# Patient Record
Sex: FEMALE | Race: WHITE | Employment: OTHER | URBAN - METROPOLITAN AREA
[De-identification: names, ages, dates, MRNs, and addresses within clinical notes are randomized per-mention and may not be internally consistent; named-entity substitution may affect disease eponyms.]

---

## 2017-03-14 ENCOUNTER — APPOINTMENT (OUTPATIENT)
Dept: GENERAL RADIOLOGY | Facility: HOSPITAL | Age: 77
End: 2017-03-14
Attending: EMERGENCY MEDICINE
Payer: MEDICARE

## 2017-03-14 ENCOUNTER — HOSPITAL ENCOUNTER (EMERGENCY)
Facility: HOSPITAL | Age: 77
Discharge: HOME | End: 2017-03-14
Attending: EMERGENCY MEDICINE
Payer: MEDICARE

## 2017-03-14 VITALS
DIASTOLIC BLOOD PRESSURE: 81 MMHG | SYSTOLIC BLOOD PRESSURE: 171 MMHG | HEIGHT: 63 IN | WEIGHT: 200 LBS | OXYGEN SATURATION: 95 % | BODY MASS INDEX: 35.44 KG/M2 | HEART RATE: 73 BPM | TEMPERATURE: 99 F | RESPIRATION RATE: 16 BRPM

## 2017-03-14 DIAGNOSIS — R05.9 COUGH: Primary | ICD-10-CM

## 2017-03-14 PROCEDURE — 99283 EMERGENCY DEPT VISIT LOW MDM: CPT

## 2017-03-14 PROCEDURE — 36415 COLL VENOUS BLD VENIPUNCTURE: CPT

## 2017-03-14 PROCEDURE — 71010 XR CHEST AP PORTABLE  (CPT=71010): CPT

## 2017-03-14 PROCEDURE — 86480 TB TEST CELL IMMUN MEASURE: CPT | Performed by: EMERGENCY MEDICINE

## 2017-03-14 NOTE — ED PROVIDER NOTES
Patient Seen in: [facility] Emergency Department    History   Patient presents with:  Tuberculosis    Stated Complaint: induration at TB test site    HPI    73-year-old female with a history of Parkinson's, hypertension, type 2 diabetes, rheumatoid OF HEEL SPUR Right     CHOLECYSTECTOMY      HERNIA SURGERY         Medications :   methotrexate 2.5 MG Oral Tab,  TAKE 8 TABLETS(20 MG) BY MOUTH EVERY 7 DAYS   guaiFENesin-codeine (CHERATUSSIN AC) 100-10 MG/5ML Oral Solution,  Take 5 mL by mouth every 6 (s SPRAY IN EACH NOSTRIL AT BEDTIME   SIMVASTATIN 20 MG Oral Tab,  TAKE 1 TABLET BY MOUTH EVERY DAY   guaiFENesin-codeine (CHERATUSSIN AC) 100-10 MG/5ML Oral Solution,  Take 5 mL by mouth every 6 (six) hours as needed for cough.    Cefuroxime Axetil 250 MG Ora 96%        Physical Exam    General:  Patient is alert and oriented x3. No acute distress. Well-developed and well-nourished. HEENT: Normocephalic, atraumatic. Pupils are equally round and reactive to light. Extraocular movements are intact.   Orophary Coretha Red blood test will be ordered. She will be discharged back to [facility]. This blood test will require follow-up. If it is negative no further follow-up is needed.   If positive, patient will then require follow-up with infectious diseases to a

## 2017-03-18 LAB
M TB TUBERC IFN-G BLD QL: NEGATIVE
M TB TUBERC IFN-G/MITOGEN IGNF BLD: 0.26 IU/ML
M TB TUBERC IGNF/MITOGEN IGNF CONTROL: >10 IU/ML
MITOGEN IGNF BCKGRD COR BLD-ACNC: 0.39 IU/ML

## 2017-08-09 PROCEDURE — 82043 UR ALBUMIN QUANTITATIVE: CPT | Performed by: INTERNAL MEDICINE

## 2017-08-09 PROCEDURE — 36415 COLL VENOUS BLD VENIPUNCTURE: CPT | Performed by: INTERNAL MEDICINE

## 2017-08-09 PROCEDURE — 82570 ASSAY OF URINE CREATININE: CPT | Performed by: INTERNAL MEDICINE

## 2017-09-20 PROCEDURE — 82043 UR ALBUMIN QUANTITATIVE: CPT | Performed by: INTERNAL MEDICINE

## 2017-09-20 PROCEDURE — 82570 ASSAY OF URINE CREATININE: CPT | Performed by: INTERNAL MEDICINE

## 2017-11-17 PROBLEM — M85.80 OSTEOPENIA WITH HIGH RISK OF FRACTURE: Status: ACTIVE | Noted: 2017-11-17

## 2017-11-17 PROBLEM — Z78.0 ASYMPTOMATIC POSTMENOPAUSAL STATE: Status: ACTIVE | Noted: 2017-11-17

## 2018-03-11 PROBLEM — M81.0 AGE-RELATED OSTEOPOROSIS WITHOUT CURRENT PATHOLOGICAL FRACTURE: Status: ACTIVE | Noted: 2018-03-11

## 2018-06-12 PROCEDURE — 86480 TB TEST CELL IMMUN MEASURE: CPT | Performed by: INTERNAL MEDICINE

## 2018-07-09 PROBLEM — R76.12 POSITIVE QUANTIFERON-TB GOLD TEST: Status: ACTIVE | Noted: 2018-07-09

## 2018-08-17 PROCEDURE — 87086 URINE CULTURE/COLONY COUNT: CPT | Performed by: EMERGENCY MEDICINE

## 2018-08-17 PROCEDURE — 87186 SC STD MICRODIL/AGAR DIL: CPT | Performed by: EMERGENCY MEDICINE

## 2018-08-17 PROCEDURE — 87077 CULTURE AEROBIC IDENTIFY: CPT | Performed by: EMERGENCY MEDICINE

## 2018-08-19 ENCOUNTER — APPOINTMENT (OUTPATIENT)
Dept: GENERAL RADIOLOGY | Facility: HOSPITAL | Age: 78
DRG: 552 | End: 2018-08-19
Attending: HOSPITALIST
Payer: MEDICARE

## 2018-08-19 ENCOUNTER — APPOINTMENT (OUTPATIENT)
Dept: CT IMAGING | Facility: HOSPITAL | Age: 78
DRG: 552 | End: 2018-08-19
Attending: EMERGENCY MEDICINE
Payer: MEDICARE

## 2018-08-19 ENCOUNTER — HOSPITAL ENCOUNTER (INPATIENT)
Facility: HOSPITAL | Age: 78
LOS: 3 days | Discharge: NURSING, REHAB OR LONG-TERM CARE | DRG: 552 | End: 2018-08-22
Attending: EMERGENCY MEDICINE | Admitting: INTERNAL MEDICINE
Payer: MEDICARE

## 2018-08-19 DIAGNOSIS — G47.33 OSA (OBSTRUCTIVE SLEEP APNEA): ICD-10-CM

## 2018-08-19 DIAGNOSIS — S12.390A C4 PEDICLE FRACTURE (HCC): ICD-10-CM

## 2018-08-19 DIAGNOSIS — S12.100A CLOSED DISPLACED FRACTURE OF SECOND CERVICAL VERTEBRA, UNSPECIFIED FRACTURE MORPHOLOGY, INITIAL ENCOUNTER (HCC): Primary | ICD-10-CM

## 2018-08-19 DIAGNOSIS — S12.200A CLOSED DISPLACED FRACTURE OF THIRD CERVICAL VERTEBRA, UNSPECIFIED FRACTURE MORPHOLOGY, INITIAL ENCOUNTER (HCC): ICD-10-CM

## 2018-08-19 DIAGNOSIS — S12.300A CLOSED DISPLACED FRACTURE OF FOURTH CERVICAL VERTEBRA, UNSPECIFIED FRACTURE MORPHOLOGY, INITIAL ENCOUNTER (HCC): ICD-10-CM

## 2018-08-19 DIAGNOSIS — G20 PARKINSON DISEASE (HCC): ICD-10-CM

## 2018-08-19 LAB
ALBUMIN SERPL-MCNC: 3.7 G/DL (ref 3.5–4.8)
ALBUMIN/GLOB SERPL: 0.9 {RATIO} (ref 1–2)
ALP LIVER SERPL-CCNC: 98 U/L (ref 55–142)
ALT SERPL-CCNC: 48 U/L (ref 14–54)
ANION GAP SERPL CALC-SCNC: 9 MMOL/L (ref 0–18)
AST SERPL-CCNC: 37 U/L (ref 15–41)
BASOPHILS # BLD AUTO: 0.02 X10(3) UL (ref 0–0.1)
BASOPHILS NFR BLD AUTO: 0.2 %
BILIRUB SERPL-MCNC: 0.5 MG/DL (ref 0.1–2)
BUN BLD-MCNC: 15 MG/DL (ref 8–20)
BUN/CREAT SERPL: 16 (ref 10–20)
CALCIUM BLD-MCNC: 9.8 MG/DL (ref 8.3–10.3)
CHLORIDE SERPL-SCNC: 100 MMOL/L (ref 101–111)
CO2 SERPL-SCNC: 28 MMOL/L (ref 22–32)
CREAT BLD-MCNC: 0.94 MG/DL (ref 0.55–1.02)
EOSINOPHIL # BLD AUTO: 0.16 X10(3) UL (ref 0–0.3)
EOSINOPHIL NFR BLD AUTO: 1.3 %
ERYTHROCYTE [DISTWIDTH] IN BLOOD BY AUTOMATED COUNT: 15.1 % (ref 11.5–16)
EST. AVERAGE GLUCOSE BLD GHB EST-MCNC: 183 MG/DL (ref 68–126)
GLOBULIN PLAS-MCNC: 4.1 G/DL (ref 2.5–4)
GLUCOSE BLD-MCNC: 165 MG/DL (ref 65–99)
GLUCOSE BLD-MCNC: 185 MG/DL (ref 65–99)
GLUCOSE BLD-MCNC: 186 MG/DL (ref 65–99)
GLUCOSE BLD-MCNC: 198 MG/DL (ref 70–99)
GLUCOSE BLD-MCNC: 217 MG/DL (ref 65–99)
HBA1C MFR BLD HPLC: 8 % (ref ?–5.7)
HCT VFR BLD AUTO: 41.3 % (ref 34–50)
HGB BLD-MCNC: 13.7 G/DL (ref 12–16)
IMMATURE GRANULOCYTE COUNT: 0.1 X10(3) UL (ref 0–1)
IMMATURE GRANULOCYTE RATIO %: 0.8 %
LYMPHOCYTES # BLD AUTO: 2.45 X10(3) UL (ref 0.9–4)
LYMPHOCYTES NFR BLD AUTO: 20.6 %
M PROTEIN MFR SERPL ELPH: 7.8 G/DL (ref 6.1–8.3)
MCH RBC QN AUTO: 31.5 PG (ref 27–33.2)
MCHC RBC AUTO-ENTMCNC: 33.2 G/DL (ref 31–37)
MCV RBC AUTO: 94.9 FL (ref 81–100)
MONOCYTES # BLD AUTO: 1.18 X10(3) UL (ref 0.1–1)
MONOCYTES NFR BLD AUTO: 9.9 %
NEUTROPHIL ABS PRELIM: 8.01 X10 (3) UL (ref 1.3–6.7)
NEUTROPHILS # BLD AUTO: 8.01 X10(3) UL (ref 1.3–6.7)
NEUTROPHILS NFR BLD AUTO: 67.2 %
OSMOLALITY SERPL CALC.SUM OF ELEC: 290 MOSM/KG (ref 275–295)
PLATELET # BLD AUTO: 214 10(3)UL (ref 150–450)
POTASSIUM SERPL-SCNC: 4 MMOL/L (ref 3.6–5.1)
RBC # BLD AUTO: 4.35 X10(6)UL (ref 3.8–5.1)
RED CELL DISTRIBUTION WIDTH-SD: 51.9 FL (ref 35.1–46.3)
SODIUM SERPL-SCNC: 137 MMOL/L (ref 136–144)
WBC # BLD AUTO: 11.9 X10(3) UL (ref 4–13)

## 2018-08-19 PROCEDURE — 99285 EMERGENCY DEPT VISIT HI MDM: CPT

## 2018-08-19 PROCEDURE — 72125 CT NECK SPINE W/O DYE: CPT | Performed by: EMERGENCY MEDICINE

## 2018-08-19 PROCEDURE — 96374 THER/PROPH/DIAG INJ IV PUSH: CPT

## 2018-08-19 PROCEDURE — 73030 X-RAY EXAM OF SHOULDER: CPT | Performed by: HOSPITALIST

## 2018-08-19 PROCEDURE — 82962 GLUCOSE BLOOD TEST: CPT

## 2018-08-19 PROCEDURE — 94660 CPAP INITIATION&MGMT: CPT

## 2018-08-19 PROCEDURE — 96376 TX/PRO/DX INJ SAME DRUG ADON: CPT

## 2018-08-19 PROCEDURE — 73070 X-RAY EXAM OF ELBOW: CPT | Performed by: HOSPITALIST

## 2018-08-19 PROCEDURE — 80053 COMPREHEN METABOLIC PANEL: CPT | Performed by: EMERGENCY MEDICINE

## 2018-08-19 PROCEDURE — 96375 TX/PRO/DX INJ NEW DRUG ADDON: CPT

## 2018-08-19 PROCEDURE — 5A09357 ASSISTANCE WITH RESPIRATORY VENTILATION, LESS THAN 24 CONSECUTIVE HOURS, CONTINUOUS POSITIVE AIRWAY PRESSURE: ICD-10-PCS | Performed by: HOSPITALIST

## 2018-08-19 PROCEDURE — 70450 CT HEAD/BRAIN W/O DYE: CPT | Performed by: EMERGENCY MEDICINE

## 2018-08-19 PROCEDURE — 83036 HEMOGLOBIN GLYCOSYLATED A1C: CPT | Performed by: INTERNAL MEDICINE

## 2018-08-19 PROCEDURE — 85025 COMPLETE CBC W/AUTO DIFF WBC: CPT | Performed by: EMERGENCY MEDICINE

## 2018-08-19 RX ORDER — SODIUM PHOSPHATE, DIBASIC AND SODIUM PHOSPHATE, MONOBASIC 7; 19 G/133ML; G/133ML
1 ENEMA RECTAL ONCE AS NEEDED
Status: DISCONTINUED | OUTPATIENT
Start: 2018-08-19 | End: 2018-08-22

## 2018-08-19 RX ORDER — ONDANSETRON 2 MG/ML
4 INJECTION INTRAMUSCULAR; INTRAVENOUS ONCE
Status: COMPLETED | OUTPATIENT
Start: 2018-08-19 | End: 2018-08-19

## 2018-08-19 RX ORDER — MORPHINE SULFATE 4 MG/ML
2 INJECTION, SOLUTION INTRAMUSCULAR; INTRAVENOUS EVERY 30 MIN PRN
Status: DISCONTINUED | OUTPATIENT
Start: 2018-08-19 | End: 2018-08-19

## 2018-08-19 RX ORDER — OXYBUTYNIN CHLORIDE 5 MG/1
5 TABLET ORAL 2 TIMES DAILY
Status: DISCONTINUED | OUTPATIENT
Start: 2018-08-19 | End: 2018-08-22

## 2018-08-19 RX ORDER — HEPARIN SODIUM 5000 [USP'U]/ML
5000 INJECTION, SOLUTION INTRAVENOUS; SUBCUTANEOUS EVERY 12 HOURS SCHEDULED
Status: DISCONTINUED | OUTPATIENT
Start: 2018-08-19 | End: 2018-08-19

## 2018-08-19 RX ORDER — ACETAMINOPHEN AND CODEINE PHOSPHATE 300; 30 MG/1; MG/1
2 TABLET ORAL EVERY 4 HOURS PRN
Status: DISCONTINUED | OUTPATIENT
Start: 2018-08-19 | End: 2018-08-22

## 2018-08-19 RX ORDER — DOCUSATE SODIUM 100 MG/1
100 CAPSULE, LIQUID FILLED ORAL 2 TIMES DAILY
Status: DISCONTINUED | OUTPATIENT
Start: 2018-08-19 | End: 2018-08-22

## 2018-08-19 RX ORDER — LOSARTAN POTASSIUM 100 MG/1
100 TABLET ORAL
Status: DISCONTINUED | OUTPATIENT
Start: 2018-08-19 | End: 2018-08-19

## 2018-08-19 RX ORDER — MORPHINE SULFATE 4 MG/ML
2 INJECTION, SOLUTION INTRAMUSCULAR; INTRAVENOUS EVERY 30 MIN PRN
Status: DISCONTINUED | OUTPATIENT
Start: 2018-08-19 | End: 2018-08-22

## 2018-08-19 RX ORDER — ONDANSETRON 2 MG/ML
4 INJECTION INTRAMUSCULAR; INTRAVENOUS EVERY 4 HOURS PRN
Status: DISCONTINUED | OUTPATIENT
Start: 2018-08-19 | End: 2018-08-19

## 2018-08-19 RX ORDER — METOCLOPRAMIDE HYDROCHLORIDE 5 MG/ML
5 INJECTION INTRAMUSCULAR; INTRAVENOUS EVERY 8 HOURS PRN
Status: DISCONTINUED | OUTPATIENT
Start: 2018-08-19 | End: 2018-08-22

## 2018-08-19 RX ORDER — METOPROLOL SUCCINATE 100 MG/1
100 TABLET, EXTENDED RELEASE ORAL
Status: DISCONTINUED | OUTPATIENT
Start: 2018-08-19 | End: 2018-08-22

## 2018-08-19 RX ORDER — MELATONIN
50 DAILY
Status: DISCONTINUED | OUTPATIENT
Start: 2018-08-19 | End: 2018-08-22

## 2018-08-19 RX ORDER — HYDROMORPHONE HYDROCHLORIDE 1 MG/ML
0.5 INJECTION, SOLUTION INTRAMUSCULAR; INTRAVENOUS; SUBCUTANEOUS EVERY 30 MIN PRN
Status: DISCONTINUED | OUTPATIENT
Start: 2018-08-19 | End: 2018-08-19

## 2018-08-19 RX ORDER — MORPHINE SULFATE 4 MG/ML
4 INJECTION, SOLUTION INTRAMUSCULAR; INTRAVENOUS EVERY 2 HOUR PRN
Status: DISCONTINUED | OUTPATIENT
Start: 2018-08-19 | End: 2018-08-22

## 2018-08-19 RX ORDER — SULFAMETHOXAZOLE AND TRIMETHOPRIM 800; 160 MG/1; MG/1
1 TABLET ORAL 2 TIMES DAILY
Status: DISCONTINUED | OUTPATIENT
Start: 2018-08-19 | End: 2018-08-22

## 2018-08-19 RX ORDER — AMLODIPINE BESYLATE 5 MG/1
5 TABLET ORAL 2 TIMES DAILY
Status: DISCONTINUED | OUTPATIENT
Start: 2018-08-19 | End: 2018-08-22

## 2018-08-19 RX ORDER — LOSARTAN POTASSIUM 100 MG/1
100 TABLET ORAL DAILY
Status: DISCONTINUED | OUTPATIENT
Start: 2018-08-19 | End: 2018-08-22

## 2018-08-19 RX ORDER — AMILORIDE HYDROCHLORIDE 5 MG/1
5 TABLET ORAL DAILY
Status: DISCONTINUED | OUTPATIENT
Start: 2018-08-19 | End: 2018-08-19

## 2018-08-19 RX ORDER — BISACODYL 10 MG
10 SUPPOSITORY, RECTAL RECTAL
Status: DISCONTINUED | OUTPATIENT
Start: 2018-08-19 | End: 2018-08-22

## 2018-08-19 RX ORDER — HEPARIN SODIUM 5000 [USP'U]/ML
5000 INJECTION, SOLUTION INTRAVENOUS; SUBCUTANEOUS EVERY 8 HOURS SCHEDULED
Status: DISCONTINUED | OUTPATIENT
Start: 2018-08-19 | End: 2018-08-22

## 2018-08-19 RX ORDER — ISONIAZID 300 MG/1
300 TABLET ORAL DAILY
Status: DISCONTINUED | OUTPATIENT
Start: 2018-08-19 | End: 2018-08-22

## 2018-08-19 RX ORDER — DEXTROSE MONOHYDRATE 25 G/50ML
50 INJECTION, SOLUTION INTRAVENOUS
Status: DISCONTINUED | OUTPATIENT
Start: 2018-08-19 | End: 2018-08-22

## 2018-08-19 RX ORDER — ACETAMINOPHEN 325 MG/1
650 TABLET ORAL EVERY 4 HOURS PRN
Status: DISCONTINUED | OUTPATIENT
Start: 2018-08-19 | End: 2018-08-22

## 2018-08-19 RX ORDER — ONDANSETRON 2 MG/ML
4 INJECTION INTRAMUSCULAR; INTRAVENOUS EVERY 6 HOURS PRN
Status: DISCONTINUED | OUTPATIENT
Start: 2018-08-19 | End: 2018-08-22

## 2018-08-19 RX ORDER — DEXTROSE MONOHYDRATE 25 G/50ML
50 INJECTION, SOLUTION INTRAVENOUS
Status: DISCONTINUED | OUTPATIENT
Start: 2018-08-19 | End: 2018-08-19

## 2018-08-19 RX ORDER — POTASSIUM CHLORIDE 750 MG/1
10 TABLET, EXTENDED RELEASE ORAL 2 TIMES DAILY
Status: DISCONTINUED | OUTPATIENT
Start: 2018-08-19 | End: 2018-08-19

## 2018-08-19 RX ORDER — ATORVASTATIN CALCIUM 10 MG/1
10 TABLET, FILM COATED ORAL NIGHTLY
Status: DISCONTINUED | OUTPATIENT
Start: 2018-08-19 | End: 2018-08-22

## 2018-08-19 RX ORDER — FOLIC ACID 1 MG/1
4 TABLET ORAL DAILY
Status: DISCONTINUED | OUTPATIENT
Start: 2018-08-19 | End: 2018-08-22

## 2018-08-19 RX ORDER — ACETAMINOPHEN AND CODEINE PHOSPHATE 300; 30 MG/1; MG/1
1 TABLET ORAL EVERY 4 HOURS PRN
Status: DISCONTINUED | OUTPATIENT
Start: 2018-08-19 | End: 2018-08-22

## 2018-08-19 RX ORDER — MORPHINE SULFATE 4 MG/ML
2 INJECTION, SOLUTION INTRAMUSCULAR; INTRAVENOUS EVERY 2 HOUR PRN
Status: DISCONTINUED | OUTPATIENT
Start: 2018-08-19 | End: 2018-08-22

## 2018-08-19 RX ORDER — FLUTICASONE PROPIONATE 50 MCG
1 SPRAY, SUSPENSION (ML) NASAL NIGHTLY
Status: DISCONTINUED | OUTPATIENT
Start: 2018-08-19 | End: 2018-08-22

## 2018-08-19 RX ORDER — PANTOPRAZOLE SODIUM 20 MG/1
20 TABLET, DELAYED RELEASE ORAL
Status: DISCONTINUED | OUTPATIENT
Start: 2018-08-19 | End: 2018-08-22

## 2018-08-19 RX ORDER — POLYETHYLENE GLYCOL 3350 17 G/17G
17 POWDER, FOR SOLUTION ORAL DAILY PRN
Status: DISCONTINUED | OUTPATIENT
Start: 2018-08-19 | End: 2018-08-22

## 2018-08-19 NOTE — OCCUPATIONAL THERAPY NOTE
OT order received, Pt with new unstable cervical fractures per CT, ortho spine consult pending, OT will follow up tomorrow after this consult completed.

## 2018-08-19 NOTE — ED PROVIDER NOTES
Patient Seen in: [facility] Emergency Department    History   Patient presents with:  Fall (musculoskeletal, neurologic)    Stated Complaint: neck pain s/p fall    HPI    23-year-old female with a previous history of a C2, C5 fracture.   Prior histor All other systems reviewed and negative except as noted above.     Physical Exam   ED Triage Vitals  BP: 160/76 [08/19/18 0315]  Pulse: 74 [08/19/18 0213]  Resp: 18 [08/19/18 0213]  Temp: 97.9 °F (36.6 °C) [08/19/18 0213]  Temp src: Temporal [08/19/18 0 head without contrast  CT cervical spine without contrast    IMPRESSION:    Head:  No acute intracranial injury. No acute intracranial hemorrhage, mass effect, or midline shift. Mild microangiopathy.   Small left parietal scalp hematoma with acute skull fr 49-year-old female with a history significant for rheumatoid arthritis, type 2 diabetes, hypertension, prior history of a stroke, C2, C5 fracture presents to the emergency department this evening after a fall patient unfortunately suffered an acute fract

## 2018-08-19 NOTE — PROGRESS NOTES
[facility] Pharmacy Note:  Renal Dose Adjustment for Metoclopramide (REGLAN)    Jes Patrick has been prescribed Metoclopramide (REGLAN) 10 mg every 8 hours as needed for nausea. Estimated Creatinine Clearance: 39.5 mL/min (based on SCr of 0.94 mg/dL).     Her

## 2018-08-19 NOTE — PHYSICAL THERAPY NOTE
Orders received, chart reviewed. Attempted to see patient but pt declined/refused PT at this time reporting \"My back hurts\"; explained rationale for PT Evaluation & pt still refused. RN made aware; will check on patient again tomorrow.

## 2018-08-19 NOTE — PROGRESS NOTES
NURSING ADMISSION NOTE      Patient admitted via cart from ER  Oriented to room. Safety precautions initiated. Bed in low position. Call light in reach.

## 2018-08-19 NOTE — H&P
.  CC: Patient presents with:  Fall (musculoskeletal, neurologic)       PCP: Jose Antonio Rinaldi MD    History of Present Illness: Patient is a [de-identified]year old female with PMH sig for h/o C2, C5 fracture, CVA, HTN, Parkinson's, RA, DM who [facility]   LOSARTAN 100 MG Oral Tab TAKE 1 TABLET BY MOUTH EVERY DAY Disp: 90 tablet Rfl: 1   omeprazole 20 MG Oral Capsule Delayed Release Take 1 capsule (20 mg total) by mouth daily.  Disp: 30 capsule Rfl: 12   POTASSIUM CHLORIDE ER 10 MEQ Oral Tab CR TAKE 1 TAB scanning is performed through the brain. Dose reduction techniques were used. Dose information is transmitted to the [facility] [employer] of Radiology) NRDR ([address]) which includes the Dose Index Registry.   PATIENT STATED HISTORY Radiology) NRDR ([address]) which includes the Dose Index Registry. PATIENT STATED HISTORY: (As transcribed by Technologist)  fell   hitting back of head    FINDINGS:  BONES:  There is an acute C4 vertebral body fracture.   There is bilateral C4 pedicles, bilateral superior articular facets and the posterior wall of the right foramen transversarium.   Consider followup MRI to evaluate the spinal cord as well as followup CTA of the neck to evaluate for any potential vertebral artery inj

## 2018-08-19 NOTE — ED NOTES
Report given to [place], [place] [address]. Patient updated with plan of care, all questions/concerns addressed at this time.

## 2018-08-19 NOTE — ED INITIAL ASSESSMENT (HPI)
Patient got up to go to the bathroom and fell hitting her head on the dresser and presents with lac to back of head and neck pain. Patient arrives per EMS with cervical collar in place. Patient with history of neck fracture about 5 years ago.   Patient wi

## 2018-08-19 NOTE — CONSULTS
[de-identified] yo WF slipped and fell yesterday, neck pain. Previous h/o RA, multiple joint problems, neck pain, back pain. CT done, showed acute fx's, I was consulted. No paresthesias, no new weakness, no cp, no sob. Family at bedside.     ROS: neg  FHx: nc  PMHx:

## 2018-08-20 LAB
ANION GAP SERPL CALC-SCNC: 7 MMOL/L (ref 0–18)
BASOPHILS # BLD AUTO: 0.03 X10(3) UL (ref 0–0.1)
BASOPHILS NFR BLD AUTO: 0.3 %
BUN BLD-MCNC: 17 MG/DL (ref 8–20)
BUN/CREAT SERPL: 20.7 (ref 10–20)
CALCIUM BLD-MCNC: 8.9 MG/DL (ref 8.3–10.3)
CHLORIDE SERPL-SCNC: 102 MMOL/L (ref 101–111)
CO2 SERPL-SCNC: 28 MMOL/L (ref 22–32)
CREAT BLD-MCNC: 0.82 MG/DL (ref 0.55–1.02)
EOSINOPHIL # BLD AUTO: 0.12 X10(3) UL (ref 0–0.3)
EOSINOPHIL NFR BLD AUTO: 1.3 %
ERYTHROCYTE [DISTWIDTH] IN BLOOD BY AUTOMATED COUNT: 15.1 % (ref 11.5–16)
GLUCOSE BLD-MCNC: 179 MG/DL (ref 70–99)
GLUCOSE BLD-MCNC: 206 MG/DL (ref 65–99)
GLUCOSE BLD-MCNC: 224 MG/DL (ref 65–99)
GLUCOSE BLD-MCNC: 242 MG/DL (ref 65–99)
GLUCOSE BLD-MCNC: 247 MG/DL (ref 65–99)
GLUCOSE BLD-MCNC: 487 MG/DL (ref 65–99)
HAV IGM SER QL: 1.8 MG/DL (ref 1.8–2.5)
HCT VFR BLD AUTO: 35.7 % (ref 34–50)
HGB BLD-MCNC: 11.6 G/DL (ref 12–16)
IMMATURE GRANULOCYTE COUNT: 0.03 X10(3) UL (ref 0–1)
IMMATURE GRANULOCYTE RATIO %: 0.3 %
LYMPHOCYTES # BLD AUTO: 1.67 X10(3) UL (ref 0.9–4)
LYMPHOCYTES NFR BLD AUTO: 18.7 %
MCH RBC QN AUTO: 31.3 PG (ref 27–33.2)
MCHC RBC AUTO-ENTMCNC: 32.5 G/DL (ref 31–37)
MCV RBC AUTO: 96.2 FL (ref 81–100)
MONOCYTES # BLD AUTO: 0.95 X10(3) UL (ref 0.1–1)
MONOCYTES NFR BLD AUTO: 10.7 %
NEUTROPHIL ABS PRELIM: 6.11 X10 (3) UL (ref 1.3–6.7)
NEUTROPHILS # BLD AUTO: 6.11 X10(3) UL (ref 1.3–6.7)
NEUTROPHILS NFR BLD AUTO: 68.7 %
OSMOLALITY SERPL CALC.SUM OF ELEC: 290 MOSM/KG (ref 275–295)
PLATELET # BLD AUTO: 178 10(3)UL (ref 150–450)
POTASSIUM SERPL-SCNC: 4.3 MMOL/L (ref 3.6–5.1)
RBC # BLD AUTO: 3.71 X10(6)UL (ref 3.8–5.1)
RED CELL DISTRIBUTION WIDTH-SD: 52.2 FL (ref 35.1–46.3)
SODIUM SERPL-SCNC: 137 MMOL/L (ref 136–144)
WBC # BLD AUTO: 8.9 X10(3) UL (ref 4–13)

## 2018-08-20 PROCEDURE — 97530 THERAPEUTIC ACTIVITIES: CPT

## 2018-08-20 PROCEDURE — 83735 ASSAY OF MAGNESIUM: CPT | Performed by: INTERNAL MEDICINE

## 2018-08-20 PROCEDURE — 85025 COMPLETE CBC W/AUTO DIFF WBC: CPT | Performed by: INTERNAL MEDICINE

## 2018-08-20 PROCEDURE — 80048 BASIC METABOLIC PNL TOTAL CA: CPT | Performed by: INTERNAL MEDICINE

## 2018-08-20 PROCEDURE — 82962 GLUCOSE BLOOD TEST: CPT

## 2018-08-20 PROCEDURE — 97165 OT EVAL LOW COMPLEX 30 MIN: CPT

## 2018-08-20 PROCEDURE — 97162 PT EVAL MOD COMPLEX 30 MIN: CPT

## 2018-08-20 NOTE — PLAN OF CARE
PAIN - ADULT    • Verbalizes/displays adequate comfort level or patient's stated pain goal Progressing        Patient with cervical aspen collar on, instructed that she will need to wear brace all the time until fracture healed.   Assisted to get out of bed

## 2018-08-20 NOTE — OCCUPATIONAL THERAPY NOTE
OCCUPATIONAL THERAPY EVALUATION - INPATIENT     Room Number: 385/385-A  Evaluation Date: 8/20/2018  Type of Evaluation: Initial  Presenting Problem: fall with C3 and C4 fractures    Physician Order: IP Consult to Occupational Therapy  Reason for Therapy: A Right  No date: OTHER SURGICAL HISTORY      Comment: \"Veins removed\"  No date: REMOVAL OF HEEL SPUR Right  No date: TONSILLECTOMY    OCCUPATIONAL PROFILE    HOME SITUATION  Type of Home: Assisted living facility ([address] [place] [address])  [facility] safety  Awareness of Errors:  decreased awareness of errors   Awareness of Deficits:  decreased awareness of deficits    VISION  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 Results of the AM-PAC \"6 clicks\" Inpatient Activities of Daily Living Short Form for the patient is 50.11% degree of basic ADL impairment.  Research supports that patients GOALS  Patient will recall all precautions and incorporate into ADLs

## 2018-08-20 NOTE — PHYSICAL THERAPY NOTE
PHYSICAL THERAPY EVALUATION - INPATIENT     Room Number: 385/385-A  Evaluation Date: 8/20/2018  Type of Evaluation: Initial  Physician Order: PT Eval and Treat    Presenting Problem: S/p Fall on 08/19/18 - C4 Fx Posterior aspect,C3 Spinous process Fx apnea SPLIT 5-15-15    AHI 78 RDI 80 SaO2 deirdre 85 % CPAP 11  Sleep RX/ now HME   • Vitamin D deficiency        Past Surgical History  Past Surgical History:  No date: CHOLECYSTECTOMY  No date: HERNIA SURGERY  No date: HIP REPLACEMENT SURGERY Bilateral  No in end range    Lower extremity ROM is within functional limits     Lower extremity strength is within functional limits     BALANCE  Static Sitting: Fair +  Dynamic Sitting: Fair  Static Standing: Poor  Dynamic Standing: Poor -    ADDITIONAL TESTS  Additi tendency to become retropulsive frequently. Patient demonstrated unsteady gait & high risk of falls. Patient was left up in bedside chair @ end of session. Daughter Seble Delaney was present.     Exercise/Education Provided:  Bed mobility  Body mechanics  Don/Doff o modified independence level of mobility prior to return back to supportive living. PLAN  PT Treatment Plan: Bed mobility; Body mechanics; Don/doff brace; Endurance; Energy conservation;Patient education; Family education;Gait training;Neuromuscular re-educ

## 2018-08-20 NOTE — CM/SW NOTE
08/20/18 1600   CM/SW Referral Data   Referral Source Other  (PT)   Reason for Referral Discharge planning   Informant Patient;Edward Staff   Pertinent Medical Hx   Primary Care Physician Name RETA Rhodes MD   Patient Info   Patient's Mental Status Alert;

## 2018-08-20 NOTE — RESPIRATORY THERAPY NOTE
SHANA - Equipment Use Daily Summary:                  . Set Mode:  CPAP WITH C-FLEX                . Usage in hours: 5:50                . 90% Pressure (EPAP) level: 11                . 90% Insp. Pressure (IPAP): [place] Master AHI: 31.1                .  Elliott

## 2018-08-21 LAB
GLUCOSE BLD-MCNC: 197 MG/DL (ref 65–99)
GLUCOSE BLD-MCNC: 198 MG/DL (ref 65–99)
GLUCOSE BLD-MCNC: 232 MG/DL (ref 65–99)
GLUCOSE BLD-MCNC: 236 MG/DL (ref 65–99)

## 2018-08-21 PROCEDURE — 97116 GAIT TRAINING THERAPY: CPT

## 2018-08-21 PROCEDURE — 82962 GLUCOSE BLOOD TEST: CPT

## 2018-08-21 PROCEDURE — 97530 THERAPEUTIC ACTIVITIES: CPT

## 2018-08-21 NOTE — PROGRESS NOTES
[facility] Hospitalist note    PCP: Aysha Weinberg MD    Chief Complaint:  [de-identified] yo woman with cervical neck fractures after mechanical fall    SUBJECTIVE:  Pain is uncomfortable in the neck, trying to position herself, use ice/pain meds.  No weakness/numbness in h Oral TID   • Fluticasone Propionate  1 spray Each Nare Nightly   • folic acid  4 mg Oral Daily   • isoniazid  300 mg Oral Daily   • losartan  100 mg Oral Daily   • Metoprolol Succinate ER  100 mg Oral 2x Daily(Beta Blocker)   • Pantoprazole Sodium  20 mg O

## 2018-08-21 NOTE — RESPIRATORY THERAPY NOTE
SHANA - Equipment Use Daily Summary:  · Set Mode :  · Usage in hours:   · 90% Pressure (EPAP) level:   · 90% Insp Pressure (IPAP):   · AHI:   · Supplemental Oxygen:   · Comments: NO INFO/DID NOT USE

## 2018-08-21 NOTE — CM/SW NOTE
Pt accepted to [facility] fro rehab. Updated pt re: above. She requests private room if possible.     Message left for Neymar Horne in admissions at above JATINDER re: pt's request.

## 2018-08-21 NOTE — CM/SW NOTE
Informed by RN that pt did speak with her dtr and they want referral to [facility] [phone]. Referral sent via [address]

## 2018-08-21 NOTE — PHYSICAL THERAPY NOTE
PHYSICAL THERAPY TREATMENT NOTE - INPATIENT    Room Number: 385/385-A     Session: 1   Number of Visits to Meet Established Goals: 5    Presenting Problem: S/p Fall on 08/19/18 - C4 Fx Posterior aspect,C3 Spinous process Fx     History related to current vertebra, unspecified fracture morphology, initial encounter [facility])      Past Medical History  Past Medical History:   Diagnosis Date   • C2 cervical fracture ([facility] [address]    • C5 vertebral fracture (H[facility])    • CVA (cerebral infarction)     x2   • HTN (hypertension and standing up from a chair with arms (e.g., wheelchair, bedside commode, etc.): A Little   -   Moving from lying on back to sitting on the side of the bed?: A Lot   How much help from another person does the patient currently need. ..   -   Moving to and continues to present with deficits of dec static/dynamic sitting and standing balance, dec activity tolerance, dec safety awareness and knowledge of spinal precautions.  Pt will continue to benefit from IP skilled PT to address these deficits and achieve ma

## 2018-08-22 VITALS
TEMPERATURE: 99 F | HEART RATE: 59 BPM | HEIGHT: 63 IN | RESPIRATION RATE: 23 BRPM | BODY MASS INDEX: 36.32 KG/M2 | SYSTOLIC BLOOD PRESSURE: 155 MMHG | DIASTOLIC BLOOD PRESSURE: 73 MMHG | OXYGEN SATURATION: 91 % | WEIGHT: 205 LBS

## 2018-08-22 LAB
ANION GAP SERPL CALC-SCNC: 7 MMOL/L (ref 0–18)
BASOPHILS # BLD AUTO: 0.03 X10(3) UL (ref 0–0.1)
BASOPHILS NFR BLD AUTO: 0.3 %
BUN BLD-MCNC: 16 MG/DL (ref 8–20)
BUN/CREAT SERPL: 26.2 (ref 10–20)
CALCIUM BLD-MCNC: 9.2 MG/DL (ref 8.3–10.3)
CHLORIDE SERPL-SCNC: 101 MMOL/L (ref 101–111)
CO2 SERPL-SCNC: 27 MMOL/L (ref 22–32)
CREAT BLD-MCNC: 0.61 MG/DL (ref 0.55–1.02)
EOSINOPHIL # BLD AUTO: 0.08 X10(3) UL (ref 0–0.3)
EOSINOPHIL NFR BLD AUTO: 0.8 %
ERYTHROCYTE [DISTWIDTH] IN BLOOD BY AUTOMATED COUNT: 14.4 % (ref 11.5–16)
GLUCOSE BLD-MCNC: 186 MG/DL (ref 65–99)
GLUCOSE BLD-MCNC: 188 MG/DL (ref 70–99)
GLUCOSE BLD-MCNC: 233 MG/DL (ref 65–99)
HCT VFR BLD AUTO: 37.9 % (ref 34–50)
HGB BLD-MCNC: 12.5 G/DL (ref 12–16)
IMMATURE GRANULOCYTE COUNT: 0.04 X10(3) UL (ref 0–1)
IMMATURE GRANULOCYTE RATIO %: 0.4 %
LYMPHOCYTES # BLD AUTO: 1.78 X10(3) UL (ref 0.9–4)
LYMPHOCYTES NFR BLD AUTO: 16.9 %
MCH RBC QN AUTO: 31.3 PG (ref 27–33.2)
MCHC RBC AUTO-ENTMCNC: 33 G/DL (ref 31–37)
MCV RBC AUTO: 95 FL (ref 81–100)
MONOCYTES # BLD AUTO: 1.05 X10(3) UL (ref 0.1–1)
MONOCYTES NFR BLD AUTO: 10 %
NEUTROPHIL ABS PRELIM: 7.54 X10 (3) UL (ref 1.3–6.7)
NEUTROPHILS # BLD AUTO: 7.54 X10(3) UL (ref 1.3–6.7)
NEUTROPHILS NFR BLD AUTO: 71.6 %
OSMOLALITY SERPL CALC.SUM OF ELEC: 286 MOSM/KG (ref 275–295)
PLATELET # BLD AUTO: 220 10(3)UL (ref 150–450)
POTASSIUM SERPL-SCNC: 4.4 MMOL/L (ref 3.6–5.1)
RBC # BLD AUTO: 3.99 X10(6)UL (ref 3.8–5.1)
RED CELL DISTRIBUTION WIDTH-SD: 50.4 FL (ref 35.1–46.3)
SODIUM SERPL-SCNC: 135 MMOL/L (ref 136–144)
WBC # BLD AUTO: 10.5 X10(3) UL (ref 4–13)

## 2018-08-22 PROCEDURE — 80048 BASIC METABOLIC PNL TOTAL CA: CPT | Performed by: HOSPITALIST

## 2018-08-22 PROCEDURE — 97116 GAIT TRAINING THERAPY: CPT

## 2018-08-22 PROCEDURE — 97530 THERAPEUTIC ACTIVITIES: CPT

## 2018-08-22 PROCEDURE — 82962 GLUCOSE BLOOD TEST: CPT

## 2018-08-22 PROCEDURE — 85025 COMPLETE CBC W/AUTO DIFF WBC: CPT | Performed by: HOSPITALIST

## 2018-08-22 RX ORDER — ACETAMINOPHEN AND CODEINE PHOSPHATE 300; 30 MG/1; MG/1
1 TABLET ORAL EVERY 4 HOURS PRN
Qty: 30 TABLET | Refills: 0 | Status: SHIPPED | OUTPATIENT
Start: 2018-08-22 | End: 2018-09-07

## 2018-08-22 RX ORDER — PSEUDOEPHEDRINE HCL 30 MG
100 TABLET ORAL 2 TIMES DAILY
Qty: 30 CAPSULE | Refills: 0 | Status: SHIPPED | OUTPATIENT
Start: 2018-08-22 | End: 2019-02-27

## 2018-08-22 RX ORDER — POLYETHYLENE GLYCOL 3350 17 G/17G
17 POWDER, FOR SOLUTION ORAL DAILY PRN
Qty: 5 EACH | Refills: 0 | Status: SHIPPED | OUTPATIENT
Start: 2018-08-22 | End: 2019-02-27

## 2018-08-22 RX ORDER — PYRIDOXINE HCL (VITAMIN B6) 50 MG
50 TABLET ORAL DAILY
Qty: 30 TABLET | Refills: 0 | Status: SHIPPED | OUTPATIENT
Start: 2018-08-23 | End: 2020-06-22 | Stop reason: ALTCHOICE

## 2018-08-22 RX ORDER — SULFAMETHOXAZOLE AND TRIMETHOPRIM 800; 160 MG/1; MG/1
1 TABLET ORAL 2 TIMES DAILY
Qty: 6 TABLET | Refills: 0 | Status: SHIPPED | OUTPATIENT
Start: 2018-08-22 | End: 2018-08-25

## 2018-08-22 NOTE — RESPIRATORY THERAPY NOTE
SHANA - Equipment Use Daily Summary:                  . Set Mode:                . Usage in hours:                . 90% Pressure (EPAP) level:                . 90% Insp. Pressure (IPAP): Marysol Braga AHI:                .  Supplemental Oxygen:    LPM

## 2018-08-22 NOTE — CM/SW NOTE
08/22/18 1500   Discharge disposition   Expected discharge disposition Skilled Nurs   Name of Facillity/Home Care/Hospice [facility]   Patient is Discharged to a [address] [place] [address] Yes   Discharge transportation [employer]

## 2018-08-22 NOTE — CM/SW NOTE
Informed by RN that pt is medically cleared for d/c. Discussed 4:30PM d/c time. Spoke with Liam Wall with [facility] ambulance service- not able to accept transport until 5:15PM.    Message left for Ke Cross at [facility] [phone] re: above.     Pt and dtr

## 2018-08-22 NOTE — CM/SW NOTE
Spoke with Kimberlee Grigsby at [facility] [place] -private room available for pt today if medically cleared for d/c.  Spoke with RN- pt will require ambulance transport.

## 2018-08-22 NOTE — PROGRESS NOTES
[facility] Hospitalist daily note    Patient was seen/examined on 8/21/18    S: pain in the neck is ok as long as long as she is not moving. No chest pain, no SOB, no nausea, no abd pain. Patient feels that BM is coming.   No new numbness (per patient she has

## 2018-08-22 NOTE — DISCHARGE SUMMARY
[facility] Dis[facility] Summary    Patient ID:  Joseph Juan  [id number]  5 year old  8/2/1940    Admit date: 8/19/2018  Discharge date and time: 8/22/18  Attending Physician: Tiffanie Allan MD  Primary Care Physician: Josue Lane MD     Admit head. No dizziness, syncope. Was just diagnosed with UTI at [facility] 2 days ago but not currently having any dysuria after starting abx. Has chronic sob. Eating okay. No n/v/d/c.     Hospital Course:   Pt was seen by spine surgery and felt to not require interve Besylate 5 MG Tabs  Commonly known as:  NORVASC  TAKE 1 TABLET BY MOUTH TWICE DAILY     ASPIRIN LOW DOSE 81 MG Tbec  Generic drug:  aspirin  TAKE 1 TABLET BY MOUTH TWICE DAILY     BOTOX IJ     carbidopa-levodopa  MG Tabs  Commonly known as:  SINEMET Tabs  Commonly known as:  ZOCOR  TAKE 1 TABLET BY MOUTH EVERY DAY     Sulfamethoxazole-TMP -160 MG Tabs per tablet  Commonly known as:  BACTRIM DS  Take 1 tablet by mouth 2 (two) times daily.         * This list has 2 medication(s) that are the same a Radiology) NRDR ([address]) which includes the Dose Index Registry. PATIENT STATED HISTORY: (As transcribed by Technologist)  FELL   HITTING BACK OF HEAD    FINDINGS:  VENTRICLES/SULCI:  The ventricles are normal in size.   Mild prom Technologist)  fell   hitting back of head    FINDINGS:  BONES:  There is an acute C4 vertebral body fracture.   There is a flexion teardrop fracture through the anterior inferior endplate of C4 as well as fracture involving the posterior aspect of the vert followup MRI to evaluate the spinal cord as well as followup CTA of the neck to evaluate for any potential vertebral artery injury. Spinous process fractures of C2 and C3 are also noted as described above.   The [facility] radiologist discussed the findings wi

## 2018-08-22 NOTE — PHYSICAL THERAPY NOTE
PHYSICAL THERAPY TREATMENT NOTE - INPATIENT    Room Number: 385/385-A     Session: 2   Number of Visits to Meet Established Goals: 5    Presenting Problem: S/p Fall on 08/19/18 - C4 Fx Posterior aspect,C3 Spinous process Fx     History related to current D deficiency        Past Surgical History  Past Surgical History:  No date: CHOLECYSTECTOMY  No date: HERNIA SURGERY  No date: HIP REPLACEMENT SURGERY Bilateral  No date: KNEE REPLACEMENT SURGERY Right  No date: OTHER SURGICAL HISTORY      Comment: \"Veins STATUS  Gait Assessment   Gait Assistance: Maximum assistance (actual min assist)  Distance (ft): 100  Assistive Device: Rolling walker  Pattern: Shuffle (flexed posture)  Stoop/Curb Assistance: Not tested  Comment : Above score is based on FIM definations knowledge of spinal precautions. Continue to strongly recommend JATINDER upon d/c from [facility]. DISCHARGE RECOMMENDATIONS  PT Discharge Recommendations: Sub-acute rehabilitation (ELOS : 10 to 14 days)     PLAN  PT Treatment Plan: Bed mobility; Body mechanics; Don/d

## 2018-08-23 ENCOUNTER — DOCUMENTATION ONLY (OUTPATIENT)
Dept: INTERNAL MEDICINE CLINIC | Age: 78
End: 2018-08-23

## 2018-08-28 ENCOUNTER — SNF VISIT (OUTPATIENT)
Dept: INTERNAL MEDICINE CLINIC | Age: 78
End: 2018-08-28

## 2018-08-28 DIAGNOSIS — S12.9XXD CLOSED FRACTURE OF MULTIPLE CERVICAL VERTEBRAE, SUBSEQUENT ENCOUNTER: Primary | ICD-10-CM

## 2018-08-28 DIAGNOSIS — Z79.899 MEDICATION MANAGEMENT: ICD-10-CM

## 2018-08-28 DIAGNOSIS — R52 PAIN: ICD-10-CM

## 2018-08-28 PROCEDURE — 99310 SBSQ NF CARE HIGH MDM 45: CPT | Performed by: NURSE PRACTITIONER

## 2018-08-28 RX ORDER — TRAMADOL HYDROCHLORIDE 50 MG/1
50 TABLET ORAL EVERY 6 HOURS PRN
COMMUNITY
End: 2018-09-07 | Stop reason: ALTCHOICE

## 2018-08-28 RX ORDER — GABAPENTIN 300 MG/1
300 CAPSULE ORAL DAILY
COMMUNITY
End: 2018-09-07 | Stop reason: ALTCHOICE

## 2018-08-29 NOTE — PROGRESS NOTES
Edinson Interiano  : 1940  Age [de-identified]year old  female patient is admitted to Facility: [facility] for [facility] date:  18  Discharge date to [facility]:  18  ELOS:  10-14 days  Anticipated discharge date:  18; sooner per patient p mechanical  Dm  ra  htn  Parkinson's  Latent tb infection  cale  Recent uti      Patient seen in follow-up related to hospitalization, in subacute rehab, where patient will undergo PT/OT/ST evaluation and treatment as needed in an effort to restore to previ SURGICAL HISTORY      Comment: \"Veins removed\"  No date: REMOVAL OF HEEL SPUR Right  No date: TONSILLECTOMY  Family History   Problem Relation Age of Onset   • Other [OTHER] Mother      RA   • Other [OTHER] Sister      RA   • mitral valve prolapse [OTHER vitamin B6 50mg daily Disp: 30 tablet Rfl: 8   Oxybutynin Chloride 5 MG Oral Tab One half tablet twice a day Disp: 30 tablet Rfl: 6   ergocalciferol 96555 units Oral Cap Take 1 capsule (50,000 Units total) by mouth every 14 (fourteen) days.  Disp: 2 capsule months Disp:  Rfl:    InFLIXimab (REMICADE IV) Inject  into the vein. Every six weeks Disp:  Rfl:        VITALS:  VS reviewed;  WNL   REVIEW OF SYSTEMS:  GENERAL HEALTH:feels well otherwise  SKIN: denies any unusual skin lesions or rashes  WOUNDS: scalp  (L) 08/22/2018   K 4.4 08/22/2018    08/22/2018   CO2 27.0 08/22/2018         Lab Results  Component Value Date   WBC 10.5 08/22/2018   RBC 3.99 08/22/2018   HGB 12.5 08/22/2018   HCT 37.9 08/22/2018   .0 08/22/2018   MCV 95.0 08/22/2 Ergocalciferol 50,000units po q 14 days  Folic acid 4mg po daily     GERD  Omeprazole 20mg po daily     Narcolepsy  Modafinil 100mg po daily     Seasonal Allergies  Flonase nasal spray    UTI--RESOLVED  Sulfamethoxazole-TMP -160mg tablet; 1 tab po

## 2018-08-30 ENCOUNTER — SNF VISIT (OUTPATIENT)
Dept: INTERNAL MEDICINE CLINIC | Age: 78
End: 2018-08-30

## 2018-08-30 VITALS
SYSTOLIC BLOOD PRESSURE: 141 MMHG | TEMPERATURE: 98 F | DIASTOLIC BLOOD PRESSURE: 76 MMHG | OXYGEN SATURATION: 97 % | HEART RATE: 65 BPM | RESPIRATION RATE: 18 BRPM

## 2018-08-30 DIAGNOSIS — R52 PAIN: ICD-10-CM

## 2018-08-30 DIAGNOSIS — R53.81 PHYSICAL DECONDITIONING: ICD-10-CM

## 2018-08-30 DIAGNOSIS — Z79.899 MEDICATION MANAGEMENT: ICD-10-CM

## 2018-08-30 DIAGNOSIS — S12.9XXD CLOSED FRACTURE OF MULTIPLE CERVICAL VERTEBRAE, SUBSEQUENT ENCOUNTER: Primary | ICD-10-CM

## 2018-08-30 PROCEDURE — 99310 SBSQ NF CARE HIGH MDM 45: CPT | Performed by: NURSE PRACTITIONER

## 2018-08-31 RX ORDER — LIDOCAINE 4 G/G
2 PATCH TOPICAL
COMMUNITY
End: 2018-09-07 | Stop reason: ALTCHOICE

## 2018-08-31 NOTE — PROGRESS NOTES
Maninder Garcia, 62/1940, [de-identified]year old, female    Chief Complaint:  Patient presents with:   Follow - Up  Musculoskeletal Problem  Pain  [facility]     [facility] Admit date:  8/19/18  Discharge date to [facility]:  8/22/18  ELOS:  10-14 days  Anticipat importance of having 2 nurses to change cervical collar---patient supine in bed with 1 RN maintaining c-spine precautions while the other RN carefully removes/reapplies cervical collar. RN verbalized understanding.   Patient pleased that this will be avail po daily  Furosemide 40mg po daily  KCl 10mEq po BID    Simvastatin 20mg po daily   8/31/18:  BMP     Depression  Sertraline 50mg po daily   Follow-up with PCP/psych     Parkinson Disease  Carbidopa-levodopa 25-100mg tab; 1 tab po TID     RA  Methotrexate 2

## 2018-09-03 ENCOUNTER — DOCUMENTATION ONLY (OUTPATIENT)
Dept: INTERNAL MEDICINE CLINIC | Age: 78
End: 2018-09-03

## 2018-09-04 NOTE — PROGRESS NOTES
Bing Loera  : 1940  Age [de-identified]year old  female patient is admitted to [facility] for JATINDER after suffering traumatic fx's C2-4.     Chief complaint:    Wants her daughters, nearby, to see her more often    States if taken another fall this bad she does Comment:headaches  Adhesive Tape           RASH    Comment:States makes skin \"raw\"    CODE STATUS:  Full Code    ADVANCED CARE PLANNING TEAM: None      CURRENT MEDICATIONS   See Reji Escoto [facility]    REVIEW OF SYSTEMS:    HEENT= no c/o visual difficulties with co related to above issues. Patient states a correctunderstanding of  the above and states agreement  with the plan.     Lissett Gatica MD  9/3/18    CC: PCP, N-Surg

## 2018-09-04 NOTE — PROGRESS NOTES
Memory Vaibhavis  : 1940  Age [de-identified]year old  female patient is admitted to [facility] for JATINDER after suffering traumatic fx's C2-4. Chief complaint:   Neck pain excrutioaning with limited but not all motions.     States never needed O2 prn til recent in makes skin \"raw\"    CODE STATUS:  Full Code    ADVANCED CARE PLANNING TEAM: None      CURRENT MEDICATIONS   See Nikolas Blush [facility]    REVIEW OF SYSTEMS:    HEENT= denies visual difficulties with corrective lenses, denies sore throat, sinus congestion sx. SEE HPI. MD  8/23/18    CC: PCP, N-Surg

## 2018-09-13 ENCOUNTER — NURSE ONLY (OUTPATIENT)
Dept: LAB | Age: 78
End: 2018-09-13
Attending: INTERNAL MEDICINE
Payer: MEDICARE

## 2018-09-13 DIAGNOSIS — I10 HYPERTENSION, UNSPECIFIED TYPE: ICD-10-CM

## 2018-09-13 PROCEDURE — 80048 BASIC METABOLIC PNL TOTAL CA: CPT

## 2018-09-13 PROCEDURE — 36415 COLL VENOUS BLD VENIPUNCTURE: CPT

## 2018-09-17 ENCOUNTER — DIAGNOSTIC TRANS (OUTPATIENT)
Dept: OTHER | Age: 78
End: 2018-09-17

## 2018-09-17 ENCOUNTER — HOSPITAL (OUTPATIENT)
Dept: OTHER | Age: 78
End: 2018-09-17
Attending: INTERNAL MEDICINE

## 2018-09-17 LAB
ANALYZER ANC (IANC): ABNORMAL
ANION GAP SERPL CALC-SCNC: 12 MMOL/L (ref 10–20)
APTT PPP: 32 SECONDS (ref 22–30)
APTT PPP: ABNORMAL S
BASOPHILS # BLD: 0 THOUSAND/MCL (ref 0–0.3)
BASOPHILS NFR BLD: 0 %
BUN SERPL-MCNC: 17 MG/DL (ref 6–20)
BUN/CREAT SERPL: 22 (ref 7–25)
CALCIUM SERPL-MCNC: 9.5 MG/DL (ref 8.4–10.2)
CHLORIDE: 98 MMOL/L (ref 98–107)
CO2 SERPL-SCNC: 29 MMOL/L (ref 21–32)
CREAT SERPL-MCNC: 0.79 MG/DL (ref 0.51–0.95)
DIFFERENTIAL METHOD BLD: ABNORMAL
EOSINOPHIL # BLD: 0.1 THOUSAND/MCL (ref 0.1–0.5)
EOSINOPHIL NFR BLD: 1 %
ERYTHROCYTE [DISTWIDTH] IN BLOOD: 15.3 % (ref 11–15)
GLUCOSE BLDC GLUCOMTR-MCNC: 123 MG/DL (ref 65–99)
GLUCOSE BLDC GLUCOMTR-MCNC: 185 MG/DL (ref 65–99)
GLUCOSE SERPL-MCNC: 147 MG/DL (ref 65–99)
HEMATOCRIT: 36.5 % (ref 36–46.5)
HGB BLD-MCNC: 12.3 GM/DL (ref 12–15.5)
INR PPP: 1.1
LYMPHOCYTES # BLD: 1.7 THOUSAND/MCL (ref 1–4)
LYMPHOCYTES NFR BLD: 15 %
MCH RBC QN AUTO: 32.4 PG (ref 26–34)
MCHC RBC AUTO-ENTMCNC: 33.7 GM/DL (ref 32–36.5)
MCV RBC AUTO: 96.1 FL (ref 78–100)
MONOCYTES # BLD: 1.5 THOUSAND/MCL (ref 0.3–0.9)
MONOCYTES NFR BLD: 13 %
NEUTROPHILS # BLD: 8 THOUSAND/MCL (ref 1.8–7.7)
NEUTROPHILS NFR BLD: 71 %
NEUTS SEG NFR BLD: ABNORMAL %
NRBC (NRBCRE): ABNORMAL
PLATELET # BLD: 313 THOUSAND/MCL (ref 140–450)
POTASSIUM SERPL-SCNC: 4.3 MMOL/L (ref 3.4–5.1)
PROCALCITONIN SERPL IA-MCNC: 0.1 NG/ML
PROTHROMBIN TIME: 11.5 SECONDS (ref 9.7–11.8)
PROTHROMBIN TIME: NORMAL
RBC # BLD: 3.8 MILLION/MCL (ref 4–5.2)
SODIUM SERPL-SCNC: 135 MMOL/L (ref 135–145)
WBC # BLD: 11.2 THOUSAND/MCL (ref 4.2–11)

## 2018-09-18 LAB
GLUCOSE BLDC GLUCOMTR-MCNC: 142 MG/DL (ref 65–99)
GLUCOSE BLDC GLUCOMTR-MCNC: 144 MG/DL (ref 65–99)
GLUCOSE BLDC GLUCOMTR-MCNC: 157 MG/DL (ref 65–99)
GLUCOSE BLDC GLUCOMTR-MCNC: 182 MG/DL (ref 65–99)
GLUCOSE BLDC GLUCOMTR-MCNC: 200 MG/DL (ref 65–99)

## 2018-09-19 LAB
ANALYZER ANC (IANC): ABNORMAL
ANION GAP SERPL CALC-SCNC: 12 MMOL/L (ref 10–20)
BASOPHILS # BLD: 0 THOUSAND/MCL (ref 0–0.3)
BASOPHILS NFR BLD: 0 %
BUN SERPL-MCNC: 8 MG/DL (ref 6–20)
BUN/CREAT SERPL: 17 (ref 7–25)
CALCIUM SERPL-MCNC: 9 MG/DL (ref 8.4–10.2)
CHLORIDE: 100 MMOL/L (ref 98–107)
CO2 SERPL-SCNC: 28 MMOL/L (ref 21–32)
CREAT SERPL-MCNC: 0.48 MG/DL (ref 0.51–0.95)
DIFFERENTIAL METHOD BLD: ABNORMAL
EOSINOPHIL # BLD: 0.1 THOUSAND/MCL (ref 0.1–0.5)
EOSINOPHIL NFR BLD: 2 %
ERYTHROCYTE [DISTWIDTH] IN BLOOD: 14.8 % (ref 11–15)
GLUCOSE BLDC GLUCOMTR-MCNC: 159 MG/DL (ref 65–99)
GLUCOSE BLDC GLUCOMTR-MCNC: 193 MG/DL (ref 65–99)
GLUCOSE BLDC GLUCOMTR-MCNC: 195 MG/DL (ref 65–99)
GLUCOSE BLDC GLUCOMTR-MCNC: 196 MG/DL (ref 65–99)
GLUCOSE BLDC GLUCOMTR-MCNC: 206 MG/DL (ref 65–99)
GLUCOSE BLDC GLUCOMTR-MCNC: 225 MG/DL (ref 65–99)
GLUCOSE SERPL-MCNC: 197 MG/DL (ref 65–99)
HEMATOCRIT: 34.6 % (ref 36–46.5)
HGB BLD-MCNC: 11.5 GM/DL (ref 12–15.5)
LYMPHOCYTES # BLD: 1.6 THOUSAND/MCL (ref 1–4)
LYMPHOCYTES NFR BLD: 19 %
MCH RBC QN AUTO: 31.6 PG (ref 26–34)
MCHC RBC AUTO-ENTMCNC: 33.2 GM/DL (ref 32–36.5)
MCV RBC AUTO: 95.1 FL (ref 78–100)
MONOCYTES # BLD: 0.8 THOUSAND/MCL (ref 0.3–0.9)
MONOCYTES NFR BLD: 9 %
NEUTROPHILS # BLD: 6.2 THOUSAND/MCL (ref 1.8–7.7)
NEUTROPHILS NFR BLD: 70 %
NEUTS SEG NFR BLD: ABNORMAL %
NRBC (NRBCRE): ABNORMAL
PLATELET # BLD: 312 THOUSAND/MCL (ref 140–450)
POTASSIUM SERPL-SCNC: 3.6 MMOL/L (ref 3.4–5.1)
RBC # BLD: 3.64 MILLION/MCL (ref 4–5.2)
SODIUM SERPL-SCNC: 136 MMOL/L (ref 135–145)
WBC # BLD: 8.8 THOUSAND/MCL (ref 4.2–11)

## 2018-09-20 ENCOUNTER — CHARTING TRANS (OUTPATIENT)
Dept: OTHER | Age: 78
End: 2018-09-20

## 2018-09-20 LAB
ANION GAP SERPL CALC-SCNC: 10 MMOL/L (ref 10–20)
BUN SERPL-MCNC: 11 MG/DL (ref 6–20)
BUN/CREAT SERPL: 21 (ref 7–25)
CALCIUM SERPL-MCNC: 8.3 MG/DL (ref 8.4–10.2)
CHLORIDE: 102 MMOL/L (ref 98–107)
CO2 SERPL-SCNC: 29 MMOL/L (ref 21–32)
CREAT SERPL-MCNC: 0.52 MG/DL (ref 0.51–0.95)
GLUCOSE BLDC GLUCOMTR-MCNC: 181 MG/DL (ref 65–99)
GLUCOSE BLDC GLUCOMTR-MCNC: 188 MG/DL (ref 65–99)
GLUCOSE BLDC GLUCOMTR-MCNC: 196 MG/DL (ref 65–99)
GLUCOSE BLDC GLUCOMTR-MCNC: 199 MG/DL (ref 65–99)
GLUCOSE BLDC GLUCOMTR-MCNC: 199 MG/DL (ref 65–99)
GLUCOSE SERPL-MCNC: 209 MG/DL (ref 65–99)
POTASSIUM SERPL-SCNC: 3.7 MMOL/L (ref 3.4–5.1)
SODIUM SERPL-SCNC: 137 MMOL/L (ref 135–145)

## 2018-09-21 LAB
ANALYZER ANC (IANC): ABNORMAL
ANION GAP SERPL CALC-SCNC: 9 MMOL/L (ref 10–20)
BUN SERPL-MCNC: 10 MG/DL (ref 6–20)
BUN/CREAT SERPL: 19 (ref 7–25)
CALCIUM SERPL-MCNC: 8.8 MG/DL (ref 8.4–10.2)
CHLORIDE: 103 MMOL/L (ref 98–107)
CO2 SERPL-SCNC: 29 MMOL/L (ref 21–32)
CREAT SERPL-MCNC: 0.53 MG/DL (ref 0.51–0.95)
ERYTHROCYTE [DISTWIDTH] IN BLOOD: 15.2 % (ref 11–15)
GLUCOSE BLDC GLUCOMTR-MCNC: 159 MG/DL (ref 65–99)
GLUCOSE BLDC GLUCOMTR-MCNC: 165 MG/DL (ref 65–99)
GLUCOSE BLDC GLUCOMTR-MCNC: 189 MG/DL (ref 65–99)
GLUCOSE BLDC GLUCOMTR-MCNC: 192 MG/DL (ref 65–99)
GLUCOSE BLDC GLUCOMTR-MCNC: 204 MG/DL (ref 65–99)
GLUCOSE SERPL-MCNC: 164 MG/DL (ref 65–99)
HEMATOCRIT: 33.1 % (ref 36–46.5)
HGB BLD-MCNC: 11 GM/DL (ref 12–15.5)
MAGNESIUM SERPL-MCNC: 1.1 MG/DL (ref 1.7–2.4)
MAGNESIUM SERPL-MCNC: 1.8 MG/DL (ref 1.7–2.4)
MCH RBC QN AUTO: 31.7 PG (ref 26–34)
MCHC RBC AUTO-ENTMCNC: 33.2 GM/DL (ref 32–36.5)
MCV RBC AUTO: 95.4 FL (ref 78–100)
NRBC (NRBCRE): ABNORMAL
PLATELET # BLD: 335 THOUSAND/MCL (ref 140–450)
POTASSIUM SERPL-SCNC: 3.4 MMOL/L (ref 3.4–5.1)
POTASSIUM SERPL-SCNC: 4.5 MMOL/L (ref 3.4–5.1)
RBC # BLD: 3.47 MILLION/MCL (ref 4–5.2)
SODIUM SERPL-SCNC: 138 MMOL/L (ref 135–145)
WBC # BLD: 11.6 THOUSAND/MCL (ref 4.2–11)

## 2018-09-22 LAB
GLUCOSE BLDC GLUCOMTR-MCNC: 129 MG/DL (ref 65–99)
GLUCOSE BLDC GLUCOMTR-MCNC: 191 MG/DL (ref 65–99)
GLUCOSE BLDC GLUCOMTR-MCNC: 192 MG/DL (ref 65–99)

## 2018-09-25 ENCOUNTER — CHARTING TRANS (OUTPATIENT)
Dept: OTHER | Age: 78
End: 2018-09-25

## 2018-09-26 ENCOUNTER — CHARTING TRANS (OUTPATIENT)
Dept: OTHER | Age: 78
End: 2018-09-26

## 2018-10-08 PROBLEM — Z98.1 S/P CERVICAL SPINAL FUSION: Status: ACTIVE | Noted: 2018-10-08

## 2018-10-30 ENCOUNTER — NURSE ONLY (OUTPATIENT)
Dept: LAB | Age: 78
End: 2018-10-30
Attending: INTERNAL MEDICINE
Payer: MEDICARE

## 2018-10-30 DIAGNOSIS — Z98.1 S/P CERVICAL SPINAL FUSION: ICD-10-CM

## 2018-10-30 PROCEDURE — 85025 COMPLETE CBC W/AUTO DIFF WBC: CPT

## 2018-10-30 PROCEDURE — 36415 COLL VENOUS BLD VENIPUNCTURE: CPT

## 2018-11-01 ENCOUNTER — APPOINTMENT (OUTPATIENT)
Dept: LAB | Age: 78
End: 2018-11-01
Attending: INTERNAL MEDICINE
Payer: MEDICARE

## 2018-11-01 DIAGNOSIS — I10 HYPERTENSION, UNSPECIFIED TYPE: ICD-10-CM

## 2018-11-01 PROCEDURE — 36415 COLL VENOUS BLD VENIPUNCTURE: CPT

## 2018-11-01 PROCEDURE — 80053 COMPREHEN METABOLIC PANEL: CPT

## 2018-12-26 ENCOUNTER — TELEPHONE (OUTPATIENT)
Dept: NEUROSURGERY | Age: 78
End: 2018-12-26

## 2019-01-05 ENCOUNTER — APPOINTMENT (OUTPATIENT)
Dept: GENERAL RADIOLOGY | Facility: HOSPITAL | Age: 79
End: 2019-01-05
Attending: EMERGENCY MEDICINE
Payer: MEDICARE

## 2019-01-05 ENCOUNTER — HOSPITAL ENCOUNTER (EMERGENCY)
Facility: HOSPITAL | Age: 79
Discharge: HOME | End: 2019-01-05
Attending: EMERGENCY MEDICINE
Payer: MEDICARE

## 2019-01-05 VITALS
OXYGEN SATURATION: 96 % | HEART RATE: 61 BPM | RESPIRATION RATE: 18 BRPM | SYSTOLIC BLOOD PRESSURE: 167 MMHG | DIASTOLIC BLOOD PRESSURE: 70 MMHG | WEIGHT: 190 LBS | TEMPERATURE: 98 F | BODY MASS INDEX: 33.66 KG/M2 | HEIGHT: 63 IN

## 2019-01-05 DIAGNOSIS — M54.6 BACK PAIN OF THORACOLUMBAR REGION: Primary | ICD-10-CM

## 2019-01-05 DIAGNOSIS — M54.50 BACK PAIN OF THORACOLUMBAR REGION: Primary | ICD-10-CM

## 2019-01-05 PROCEDURE — 72100 X-RAY EXAM L-S SPINE 2/3 VWS: CPT | Performed by: EMERGENCY MEDICINE

## 2019-01-05 PROCEDURE — 99284 EMERGENCY DEPT VISIT MOD MDM: CPT

## 2019-01-05 PROCEDURE — 72050 X-RAY EXAM NECK SPINE 4/5VWS: CPT | Performed by: EMERGENCY MEDICINE

## 2019-01-05 PROCEDURE — 72072 X-RAY EXAM THORAC SPINE 3VWS: CPT | Performed by: EMERGENCY MEDICINE

## 2019-01-05 RX ORDER — ACETAMINOPHEN 325 MG/1
650 TABLET ORAL ONCE
Status: COMPLETED | OUTPATIENT
Start: 2019-01-05 | End: 2019-01-05

## 2019-01-05 NOTE — ED INITIAL ASSESSMENT (HPI)
Pt was getting into her wheelchair and fell while trying to get into it pt hit her head on the door of the closet. Pt had recent cervical surgery. Pt had good mobility but notes some pain.  Pt is axox3

## 2019-01-05 NOTE — ED PROVIDER NOTES
Patient Seen in: [facility] Emergency Department    History   Patient presents with:  Trauma (cardiovascular, musculoskeletal)    Stated Complaint: FALL    HPI    This is an 64-year-old female complaining of back pain this patient fell while trying above.    Physical Exam     ED Triage Vitals [01/05/19 0955]   BP (!) 180/81   Pulse 66   Resp 18   Temp 97.8 °F (36.6 °C)   Temp src Tympanic   SpO2 96 %   O2 Device None (Room air)       Current:BP (!) 167/70   Pulse 61   Temp 97.8 °F (36.6 °C) (Tympanic 1/5/2019  CONCLUSION:  No acute lumbar spine fracture. Degenerative changes as described.     Dictated by: Jasvir Argueta MD on 1/05/2019 at 11:38     Approved by: Jasvir Argueta MD                MDM   Patient was able to ambulate and was discharg

## 2019-01-05 NOTE — ED NOTES
Pt family spoken to about transport and noted 40 plus mileage for transfer pt able to sit up for wheel chair.

## 2019-02-28 ENCOUNTER — NURSE ONLY (OUTPATIENT)
Dept: LAB | Age: 79
End: 2019-02-28
Attending: INTERNAL MEDICINE
Payer: MEDICARE

## 2019-02-28 DIAGNOSIS — E78.5 DYSLIPIDEMIA: ICD-10-CM

## 2019-02-28 DIAGNOSIS — E55.9 VITAMIN D DEFICIENCY: ICD-10-CM

## 2019-02-28 DIAGNOSIS — E11.8 TYPE 2 DIABETES MELLITUS WITH COMPLICATION, WITHOUT LONG-TERM CURRENT USE OF INSULIN (HCC): ICD-10-CM

## 2019-02-28 DIAGNOSIS — I10 HYPERTENSION, UNSPECIFIED TYPE: ICD-10-CM

## 2019-02-28 LAB
ALBUMIN SERPL-MCNC: 3.2 G/DL (ref 3.4–5)
ALBUMIN/GLOB SERPL: 0.9 {RATIO} (ref 1–2)
ALP LIVER SERPL-CCNC: 108 U/L (ref 55–142)
ALT SERPL-CCNC: 22 U/L (ref 13–56)
ANION GAP SERPL CALC-SCNC: 9 MMOL/L (ref 0–18)
AST SERPL-CCNC: 24 U/L (ref 15–37)
BASOPHILS # BLD AUTO: 0.05 X10(3) UL (ref 0–0.2)
BASOPHILS NFR BLD AUTO: 0.7 %
BILIRUB SERPL-MCNC: 0.3 MG/DL (ref 0.1–2)
BUN BLD-MCNC: 15 MG/DL (ref 7–18)
BUN/CREAT SERPL: 23.4 (ref 10–20)
CALCIUM BLD-MCNC: 9.5 MG/DL (ref 8.5–10.1)
CHLORIDE SERPL-SCNC: 105 MMOL/L (ref 98–107)
CHOLEST SMN-MCNC: 99 MG/DL (ref ?–200)
CO2 SERPL-SCNC: 26 MMOL/L (ref 21–32)
CREAT BLD-MCNC: 0.64 MG/DL (ref 0.55–1.02)
DEPRECATED RDW RBC AUTO: 50.9 FL (ref 35.1–46.3)
EOSINOPHIL # BLD AUTO: 0.2 X10(3) UL (ref 0–0.7)
EOSINOPHIL NFR BLD AUTO: 2.6 %
ERYTHROCYTE [DISTWIDTH] IN BLOOD BY AUTOMATED COUNT: 14.7 % (ref 11–15)
EST. AVERAGE GLUCOSE BLD GHB EST-MCNC: 157 MG/DL (ref 68–126)
GLOBULIN PLAS-MCNC: 3.6 G/DL (ref 2.8–4.4)
GLUCOSE BLD-MCNC: 111 MG/DL (ref 70–99)
HBA1C MFR BLD HPLC: 7.1 % (ref ?–5.7)
HCT VFR BLD AUTO: 36.3 % (ref 35–48)
HDLC SERPL-MCNC: 37 MG/DL (ref 40–59)
HGB BLD-MCNC: 11.8 G/DL (ref 12–16)
IMM GRANULOCYTES # BLD AUTO: 0.01 X10(3) UL (ref 0–1)
IMM GRANULOCYTES NFR BLD: 0.1 %
LDLC SERPL CALC-MCNC: 43 MG/DL (ref ?–100)
LYMPHOCYTES # BLD AUTO: 3.56 X10(3) UL (ref 1–4)
LYMPHOCYTES NFR BLD AUTO: 46.4 %
M PROTEIN MFR SERPL ELPH: 6.8 G/DL (ref 6.4–8.2)
MCH RBC QN AUTO: 30.6 PG (ref 26–34)
MCHC RBC AUTO-ENTMCNC: 32.5 G/DL (ref 31–37)
MCV RBC AUTO: 94.3 FL (ref 80–100)
MONOCYTES # BLD AUTO: 0.82 X10(3) UL (ref 0.1–1)
MONOCYTES NFR BLD AUTO: 10.7 %
NEUTROPHILS # BLD AUTO: 3.04 X10 (3) UL (ref 1.5–7.7)
NEUTROPHILS # BLD AUTO: 3.04 X10(3) UL (ref 1.5–7.7)
NEUTROPHILS NFR BLD AUTO: 39.5 %
NONHDLC SERPL-MCNC: 62 MG/DL (ref ?–130)
OSMOLALITY SERPL CALC.SUM OF ELEC: 292 MOSM/KG (ref 275–295)
PLATELET # BLD AUTO: 209 10(3)UL (ref 150–450)
POTASSIUM SERPL-SCNC: 3.9 MMOL/L (ref 3.5–5.1)
RBC # BLD AUTO: 3.85 X10(6)UL (ref 3.8–5.3)
SODIUM SERPL-SCNC: 140 MMOL/L (ref 136–145)
TRIGL SERPL-MCNC: 93 MG/DL (ref 30–149)
TSI SER-ACNC: 0.87 MIU/ML (ref 0.36–3.74)
VIT D+METAB SERPL-MCNC: 46.8 NG/ML (ref 30–100)
VLDLC SERPL CALC-MCNC: 19 MG/DL (ref 0–30)
WBC # BLD AUTO: 7.7 X10(3) UL (ref 4–11)

## 2019-02-28 PROCEDURE — 83036 HEMOGLOBIN GLYCOSYLATED A1C: CPT

## 2019-02-28 PROCEDURE — 84443 ASSAY THYROID STIM HORMONE: CPT

## 2019-02-28 PROCEDURE — 80061 LIPID PANEL: CPT

## 2019-02-28 PROCEDURE — 82306 VITAMIN D 25 HYDROXY: CPT

## 2019-02-28 PROCEDURE — 80053 COMPREHEN METABOLIC PANEL: CPT

## 2019-02-28 PROCEDURE — 36415 COLL VENOUS BLD VENIPUNCTURE: CPT

## 2019-02-28 PROCEDURE — 85025 COMPLETE CBC W/AUTO DIFF WBC: CPT

## 2019-03-28 ENCOUNTER — NURSE ONLY (OUTPATIENT)
Dept: LAB | Age: 79
End: 2019-03-28
Attending: INTERNAL MEDICINE
Payer: MEDICARE

## 2019-03-28 DIAGNOSIS — I10 HYPERTENSION, UNSPECIFIED TYPE: ICD-10-CM

## 2019-03-28 DIAGNOSIS — R79.89 LFT ELEVATION: ICD-10-CM

## 2019-03-28 DIAGNOSIS — M06.00 SERONEGATIVE RHEUMATOID ARTHRITIS (HCC): ICD-10-CM

## 2019-03-28 LAB
ALBUMIN SERPL-MCNC: 3.3 G/DL (ref 3.4–5)
ALP LIVER SERPL-CCNC: 109 U/L (ref 55–142)
ALT SERPL-CCNC: 31 U/L (ref 13–56)
AST SERPL-CCNC: 25 U/L (ref 15–37)
BASOPHILS # BLD AUTO: 0.04 X10(3) UL (ref 0–0.2)
BASOPHILS NFR BLD AUTO: 0.5 %
BILIRUB DIRECT SERPL-MCNC: 0.1 MG/DL (ref 0–0.2)
BILIRUB SERPL-MCNC: 0.3 MG/DL (ref 0.1–2)
DEPRECATED RDW RBC AUTO: 50 FL (ref 35.1–46.3)
EOSINOPHIL # BLD AUTO: 0.23 X10(3) UL (ref 0–0.7)
EOSINOPHIL NFR BLD AUTO: 3.1 %
ERYTHROCYTE [DISTWIDTH] IN BLOOD BY AUTOMATED COUNT: 14.8 % (ref 11–15)
HCT VFR BLD AUTO: 35 % (ref 35–48)
HGB BLD-MCNC: 11.6 G/DL (ref 12–16)
IMM GRANULOCYTES # BLD AUTO: 0.01 X10(3) UL (ref 0–1)
IMM GRANULOCYTES NFR BLD: 0.1 %
LYMPHOCYTES # BLD AUTO: 3.55 X10(3) UL (ref 1–4)
LYMPHOCYTES NFR BLD AUTO: 47.1 %
M PROTEIN MFR SERPL ELPH: 6.7 G/DL (ref 6.4–8.2)
MCH RBC QN AUTO: 31 PG (ref 26–34)
MCHC RBC AUTO-ENTMCNC: 33.1 G/DL (ref 31–37)
MCV RBC AUTO: 93.6 FL (ref 80–100)
MONOCYTES # BLD AUTO: 0.75 X10(3) UL (ref 0.1–1)
MONOCYTES NFR BLD AUTO: 9.9 %
NEUTROPHILS # BLD AUTO: 2.96 X10 (3) UL (ref 1.5–7.7)
NEUTROPHILS # BLD AUTO: 2.96 X10(3) UL (ref 1.5–7.7)
NEUTROPHILS NFR BLD AUTO: 39.3 %
PLATELET # BLD AUTO: 209 10(3)UL (ref 150–450)
PTH-INTACT SERPL-MCNC: 52.5 PG/ML (ref 18.5–88)
RBC # BLD AUTO: 3.74 X10(6)UL (ref 3.8–5.3)
SED RATE-ML: 27 MM/HR (ref 0–25)
WBC # BLD AUTO: 7.5 X10(3) UL (ref 4–11)

## 2019-03-28 PROCEDURE — 83970 ASSAY OF PARATHORMONE: CPT

## 2019-03-28 PROCEDURE — 85652 RBC SED RATE AUTOMATED: CPT

## 2019-03-28 PROCEDURE — 36415 COLL VENOUS BLD VENIPUNCTURE: CPT

## 2019-03-28 PROCEDURE — 80076 HEPATIC FUNCTION PANEL: CPT

## 2019-03-28 PROCEDURE — 85025 COMPLETE CBC W/AUTO DIFF WBC: CPT

## 2019-04-02 ENCOUNTER — APPOINTMENT (OUTPATIENT)
Dept: LAB | Age: 79
End: 2019-04-02
Attending: INTERNAL MEDICINE
Payer: MEDICARE

## 2019-04-02 DIAGNOSIS — M15.9 PRIMARY OSTEOARTHRITIS INVOLVING MULTIPLE JOINTS: ICD-10-CM

## 2019-04-02 DIAGNOSIS — I10 HYPERTENSION, UNSPECIFIED TYPE: ICD-10-CM

## 2019-04-02 DIAGNOSIS — M06.00 SERONEGATIVE RHEUMATOID ARTHRITIS (HCC): ICD-10-CM

## 2019-04-02 PROCEDURE — 36415 COLL VENOUS BLD VENIPUNCTURE: CPT

## 2019-04-02 PROCEDURE — 80048 BASIC METABOLIC PNL TOTAL CA: CPT

## 2019-04-02 PROCEDURE — 86140 C-REACTIVE PROTEIN: CPT

## 2019-06-26 PROBLEM — R26.81 GAIT INSTABILITY: Status: ACTIVE | Noted: 2019-06-26

## 2019-08-07 PROBLEM — I10 DIABETES MELLITUS WITH COINCIDENT HYPERTENSION: Status: ACTIVE | Noted: 2019-08-07

## 2019-08-07 PROBLEM — I10 DIABETES MELLITUS WITH COINCIDENT HYPERTENSION (HCC): Status: ACTIVE | Noted: 2019-08-07

## 2019-08-07 PROBLEM — E11.9 DIABETES MELLITUS WITH COINCIDENT HYPERTENSION: Status: ACTIVE | Noted: 2019-08-07

## 2019-08-07 PROBLEM — E11.9 DIABETES MELLITUS WITH COINCIDENT HYPERTENSION (HCC): Status: ACTIVE | Noted: 2019-08-07

## 2019-11-14 ENCOUNTER — APPOINTMENT (OUTPATIENT)
Dept: LAB | Age: 79
End: 2019-11-14
Attending: INTERNAL MEDICINE
Payer: MEDICARE

## 2019-11-14 DIAGNOSIS — Z79.899 ENCOUNTER FOR LONG-TERM (CURRENT) USE OF OTHER MEDICATIONS: ICD-10-CM

## 2019-11-14 DIAGNOSIS — M06.00 SERONEGATIVE RHEUMATOID ARTHRITIS (HCC): ICD-10-CM

## 2019-11-14 PROCEDURE — 84520 ASSAY OF UREA NITROGEN: CPT | Performed by: INTERNAL MEDICINE

## 2019-11-14 PROCEDURE — 82565 ASSAY OF CREATININE: CPT | Performed by: INTERNAL MEDICINE

## 2019-11-14 PROCEDURE — 85652 RBC SED RATE AUTOMATED: CPT | Performed by: INTERNAL MEDICINE

## 2019-11-14 PROCEDURE — 80076 HEPATIC FUNCTION PANEL: CPT | Performed by: INTERNAL MEDICINE

## 2019-11-14 PROCEDURE — 36415 COLL VENOUS BLD VENIPUNCTURE: CPT | Performed by: INTERNAL MEDICINE

## 2019-11-14 PROCEDURE — 85025 COMPLETE CBC W/AUTO DIFF WBC: CPT | Performed by: INTERNAL MEDICINE

## 2019-11-14 PROCEDURE — 86140 C-REACTIVE PROTEIN: CPT | Performed by: INTERNAL MEDICINE

## 2020-06-16 ENCOUNTER — NURSE ONLY (OUTPATIENT)
Dept: LAB | Age: 80
End: 2020-06-16
Attending: INTERNAL MEDICINE
Payer: MEDICARE

## 2020-06-16 DIAGNOSIS — Z79.899 ENCOUNTER FOR LONG-TERM (CURRENT) USE OF HIGH-RISK MEDICATION: ICD-10-CM

## 2020-06-16 DIAGNOSIS — M06.00 SERONEGATIVE RHEUMATOID ARTHRITIS (HCC): ICD-10-CM

## 2020-06-16 PROCEDURE — 82565 ASSAY OF CREATININE: CPT

## 2020-06-16 PROCEDURE — 86480 TB TEST CELL IMMUN MEASURE: CPT

## 2020-06-16 PROCEDURE — 84520 ASSAY OF UREA NITROGEN: CPT

## 2020-06-16 PROCEDURE — 86140 C-REACTIVE PROTEIN: CPT

## 2020-06-16 PROCEDURE — 85025 COMPLETE CBC W/AUTO DIFF WBC: CPT

## 2020-06-16 PROCEDURE — 85652 RBC SED RATE AUTOMATED: CPT

## 2020-06-16 PROCEDURE — 80076 HEPATIC FUNCTION PANEL: CPT

## 2020-06-16 PROCEDURE — 36415 COLL VENOUS BLD VENIPUNCTURE: CPT

## 2020-06-16 NOTE — PROGRESS NOTES
Justin message sent to patient. Peg You,  Monitoring labs are stable. But inflammation markers are elevated - are you having any signs of infection or any signs of rheumatoid arthritis flare?    Dr. Donovan Farias

## 2020-10-10 PROBLEM — Z87.81 H/O CERVICAL FRACTURE: Status: ACTIVE | Noted: 2020-10-10

## 2020-10-10 PROBLEM — H91.13 PRESBYCUSIS OF BOTH EARS: Status: ACTIVE | Noted: 2020-10-10

## 2020-10-10 PROBLEM — E11.59 TYPE 2 DIABETES MELLITUS WITH CIRCULATORY DISORDER, WITHOUT LONG-TERM CURRENT USE OF INSULIN (HCC): Status: ACTIVE | Noted: 2020-10-10

## 2020-10-27 ENCOUNTER — NURSE ONLY (OUTPATIENT)
Dept: LAB | Age: 80
End: 2020-10-27
Attending: INTERNAL MEDICINE
Payer: MEDICARE

## 2020-10-27 DIAGNOSIS — S31.000A WOUND OF SACRAL REGION, INITIAL ENCOUNTER: ICD-10-CM

## 2020-10-27 DIAGNOSIS — I10 DIABETES MELLITUS WITH COINCIDENT HYPERTENSION (HCC): ICD-10-CM

## 2020-10-27 DIAGNOSIS — E11.9 DIABETES MELLITUS WITH COINCIDENT HYPERTENSION (HCC): ICD-10-CM

## 2020-10-27 PROCEDURE — 80053 COMPREHEN METABOLIC PANEL: CPT

## 2020-10-27 PROCEDURE — 85027 COMPLETE CBC AUTOMATED: CPT

## 2020-10-27 PROCEDURE — 36415 COLL VENOUS BLD VENIPUNCTURE: CPT

## 2020-12-24 ENCOUNTER — NURSE ONLY (OUTPATIENT)
Dept: LAB | Age: 80
End: 2020-12-24
Attending: INTERNAL MEDICINE
Payer: MEDICARE

## 2020-12-24 DIAGNOSIS — M15.9 PRIMARY OSTEOARTHRITIS INVOLVING MULTIPLE JOINTS: ICD-10-CM

## 2020-12-24 DIAGNOSIS — E53.8 DEFICIENCY OF OTHER SPECIFIED B GROUP VITAMINS: ICD-10-CM

## 2020-12-24 DIAGNOSIS — E11.69 HYPERLIPIDEMIA ASSOCIATED WITH TYPE 2 DIABETES MELLITUS (HCC): ICD-10-CM

## 2020-12-24 DIAGNOSIS — I27.0 PRIMARY PULMONARY HTN (HCC): ICD-10-CM

## 2020-12-24 DIAGNOSIS — E78.5 HYPERLIPIDEMIA ASSOCIATED WITH TYPE 2 DIABETES MELLITUS (HCC): ICD-10-CM

## 2020-12-24 DIAGNOSIS — E55.9 VITAMIN D DEFICIENCY: ICD-10-CM

## 2020-12-24 DIAGNOSIS — E11.42 TYPE 2 DIABETES MELLITUS WITH DIABETIC POLYNEUROPATHY, WITHOUT LONG-TERM CURRENT USE OF INSULIN (HCC): ICD-10-CM

## 2020-12-24 DIAGNOSIS — F32.9 MAJOR DEPRESSIVE DISORDER WITH SINGLE EPISODE, REMISSION STATUS UNSPECIFIED: ICD-10-CM

## 2020-12-24 DIAGNOSIS — E78.5 HYPERLIPIDEMIA, UNSPECIFIED HYPERLIPIDEMIA TYPE: ICD-10-CM

## 2020-12-24 PROCEDURE — 82306 VITAMIN D 25 HYDROXY: CPT

## 2020-12-24 PROCEDURE — 36415 COLL VENOUS BLD VENIPUNCTURE: CPT

## 2020-12-24 PROCEDURE — 82607 VITAMIN B-12: CPT

## 2020-12-24 PROCEDURE — 83036 HEMOGLOBIN GLYCOSYLATED A1C: CPT

## 2020-12-24 PROCEDURE — 80061 LIPID PANEL: CPT

## 2020-12-24 PROCEDURE — 85025 COMPLETE CBC W/AUTO DIFF WBC: CPT

## 2020-12-24 PROCEDURE — 84439 ASSAY OF FREE THYROXINE: CPT

## 2020-12-24 PROCEDURE — 80053 COMPREHEN METABOLIC PANEL: CPT

## 2020-12-24 PROCEDURE — 84443 ASSAY THYROID STIM HORMONE: CPT

## 2021-03-23 ENCOUNTER — NURSE ONLY (OUTPATIENT)
Dept: LAB | Age: 81
End: 2021-03-23
Attending: INTERNAL MEDICINE
Payer: MEDICARE

## 2021-03-23 DIAGNOSIS — I10 ESSENTIAL (PRIMARY) HYPERTENSION: ICD-10-CM

## 2021-03-23 DIAGNOSIS — E11.21 TYPE 2 DIABETES MELLITUS WITH DIABETIC NEPHROPATHY, WITHOUT LONG-TERM CURRENT USE OF INSULIN (HCC): ICD-10-CM

## 2021-03-23 DIAGNOSIS — M06.9 RHEUMATOID ARTHRITIS, INVOLVING UNSPECIFIED SITE, UNSPECIFIED WHETHER RHEUMATOID FACTOR PRESENT (HCC): ICD-10-CM

## 2021-03-23 DIAGNOSIS — E78.5 HYPERLIPIDEMIA ASSOCIATED WITH TYPE 2 DIABETES MELLITUS (HCC): ICD-10-CM

## 2021-03-23 DIAGNOSIS — E53.8 LOW VITAMIN B12 LEVEL: ICD-10-CM

## 2021-03-23 DIAGNOSIS — E11.69 HYPERLIPIDEMIA ASSOCIATED WITH TYPE 2 DIABETES MELLITUS (HCC): ICD-10-CM

## 2021-03-23 DIAGNOSIS — M15.9 PRIMARY OSTEOARTHRITIS INVOLVING MULTIPLE JOINTS: ICD-10-CM

## 2021-03-23 LAB
ALBUMIN SERPL-MCNC: 3 G/DL (ref 3.4–5)
ALBUMIN/GLOB SERPL: 0.9 {RATIO} (ref 1–2)
ALP LIVER SERPL-CCNC: 87 U/L
ALT SERPL-CCNC: 25 U/L
ANION GAP SERPL CALC-SCNC: 5 MMOL/L (ref 0–18)
AST SERPL-CCNC: 17 U/L (ref 15–37)
BASOPHILS # BLD AUTO: 0.04 X10(3) UL (ref 0–0.2)
BASOPHILS NFR BLD AUTO: 0.5 %
BILIRUB SERPL-MCNC: 0.4 MG/DL (ref 0.1–2)
BUN BLD-MCNC: 14 MG/DL (ref 7–18)
BUN/CREAT SERPL: 28 (ref 10–20)
CALCIUM BLD-MCNC: 9.6 MG/DL (ref 8.5–10.1)
CHLORIDE SERPL-SCNC: 106 MMOL/L (ref 98–112)
CO2 SERPL-SCNC: 29 MMOL/L (ref 21–32)
CREAT BLD-MCNC: 0.5 MG/DL
DEPRECATED RDW RBC AUTO: 53.6 FL (ref 35.1–46.3)
EOSINOPHIL # BLD AUTO: 0.21 X10(3) UL (ref 0–0.7)
EOSINOPHIL NFR BLD AUTO: 2.7 %
ERYTHROCYTE [DISTWIDTH] IN BLOOD BY AUTOMATED COUNT: 15.3 % (ref 11–15)
EST. AVERAGE GLUCOSE BLD GHB EST-MCNC: 169 MG/DL (ref 68–126)
GLOBULIN PLAS-MCNC: 3.4 G/DL (ref 2.8–4.4)
GLUCOSE BLD-MCNC: 103 MG/DL (ref 70–99)
HBA1C MFR BLD HPLC: 7.5 % (ref ?–5.7)
HCT VFR BLD AUTO: 35.3 %
HGB BLD-MCNC: 11.2 G/DL
IMM GRANULOCYTES # BLD AUTO: 0.03 X10(3) UL (ref 0–1)
IMM GRANULOCYTES NFR BLD: 0.4 %
LYMPHOCYTES # BLD AUTO: 3.64 X10(3) UL (ref 1–4)
LYMPHOCYTES NFR BLD AUTO: 46.2 %
M PROTEIN MFR SERPL ELPH: 6.4 G/DL (ref 6.4–8.2)
MCH RBC QN AUTO: 30.4 PG (ref 26–34)
MCHC RBC AUTO-ENTMCNC: 31.7 G/DL (ref 31–37)
MCV RBC AUTO: 95.9 FL
MONOCYTES # BLD AUTO: 0.73 X10(3) UL (ref 0.1–1)
MONOCYTES NFR BLD AUTO: 9.3 %
NEUTROPHILS # BLD AUTO: 3.23 X10 (3) UL (ref 1.5–7.7)
NEUTROPHILS # BLD AUTO: 3.23 X10(3) UL (ref 1.5–7.7)
NEUTROPHILS NFR BLD AUTO: 40.9 %
OSMOLALITY SERPL CALC.SUM OF ELEC: 291 MOSM/KG (ref 275–295)
PATIENT FASTING Y/N/NP: YES
PLATELET # BLD AUTO: 213 10(3)UL (ref 150–450)
POTASSIUM SERPL-SCNC: 3.7 MMOL/L (ref 3.5–5.1)
RBC # BLD AUTO: 3.68 X10(6)UL
SODIUM SERPL-SCNC: 140 MMOL/L (ref 136–145)
VIT B12 SERPL-MCNC: 364 PG/ML (ref 193–986)
WBC # BLD AUTO: 7.9 X10(3) UL (ref 4–11)

## 2021-03-23 PROCEDURE — 36415 COLL VENOUS BLD VENIPUNCTURE: CPT

## 2021-03-23 PROCEDURE — 82607 VITAMIN B-12: CPT

## 2021-03-23 PROCEDURE — 85025 COMPLETE CBC W/AUTO DIFF WBC: CPT

## 2021-03-23 PROCEDURE — 80053 COMPREHEN METABOLIC PANEL: CPT

## 2021-03-23 PROCEDURE — 83036 HEMOGLOBIN GLYCOSYLATED A1C: CPT

## 2021-03-30 ENCOUNTER — NURSE ONLY (OUTPATIENT)
Dept: LAB | Age: 81
End: 2021-03-30
Attending: INTERNAL MEDICINE
Payer: MEDICARE

## 2021-03-30 DIAGNOSIS — R60.0 BILATERAL LEG EDEMA: ICD-10-CM

## 2021-03-30 PROCEDURE — 85025 COMPLETE CBC W/AUTO DIFF WBC: CPT

## 2021-03-30 PROCEDURE — 36415 COLL VENOUS BLD VENIPUNCTURE: CPT

## 2021-03-30 PROCEDURE — 80053 COMPREHEN METABOLIC PANEL: CPT

## 2021-04-27 ENCOUNTER — NURSE ONLY (OUTPATIENT)
Dept: LAB | Age: 81
End: 2021-04-27
Attending: INTERNAL MEDICINE
Payer: MEDICARE

## 2021-04-27 DIAGNOSIS — I10 ESSENTIAL (PRIMARY) HYPERTENSION: ICD-10-CM

## 2021-04-27 DIAGNOSIS — R60.0 LOCALIZED EDEMA: ICD-10-CM

## 2021-04-27 PROCEDURE — 80053 COMPREHEN METABOLIC PANEL: CPT

## 2021-04-27 PROCEDURE — 85025 COMPLETE CBC W/AUTO DIFF WBC: CPT

## 2021-04-27 PROCEDURE — 36415 COLL VENOUS BLD VENIPUNCTURE: CPT

## 2021-04-30 PROBLEM — I65.23 BILATERAL CAROTID ARTERY STENOSIS: Status: ACTIVE | Noted: 2021-04-30

## 2021-04-30 PROBLEM — I10 ESSENTIAL HYPERTENSION: Status: ACTIVE | Noted: 2019-08-07

## 2021-04-30 PROBLEM — E78.49 OTHER HYPERLIPIDEMIA: Status: ACTIVE | Noted: 2021-04-30

## 2021-07-06 ENCOUNTER — NURSE ONLY (OUTPATIENT)
Dept: LAB | Age: 81
End: 2021-07-06
Attending: INTERNAL MEDICINE
Payer: MEDICARE

## 2021-07-06 DIAGNOSIS — Z79.899 ENCOUNTER FOR LONG-TERM (CURRENT) USE OF HIGH-RISK MEDICATION: ICD-10-CM

## 2021-07-06 DIAGNOSIS — M06.00 SERONEGATIVE RHEUMATOID ARTHRITIS (HCC): ICD-10-CM

## 2021-07-06 DIAGNOSIS — E11.42 TYPE 2 DIABETES MELLITUS WITH DIABETIC POLYNEUROPATHY, WITHOUT LONG-TERM CURRENT USE OF INSULIN (HCC): ICD-10-CM

## 2021-07-06 DIAGNOSIS — I10 ESSENTIAL HYPERTENSION: ICD-10-CM

## 2021-07-06 DIAGNOSIS — E55.9 VITAMIN D DEFICIENCY: ICD-10-CM

## 2021-07-06 LAB
ALBUMIN SERPL-MCNC: 3.1 G/DL (ref 3.4–5)
ALBUMIN/GLOB SERPL: 0.9 {RATIO} (ref 1–2)
ALP LIVER SERPL-CCNC: 82 U/L
ALT SERPL-CCNC: 29 U/L
ANION GAP SERPL CALC-SCNC: 7 MMOL/L (ref 0–18)
AST SERPL-CCNC: 22 U/L (ref 15–37)
BASOPHILS # BLD AUTO: 0.04 X10(3) UL (ref 0–0.2)
BASOPHILS NFR BLD AUTO: 0.5 %
BILIRUB DIRECT SERPL-MCNC: <0.1 MG/DL (ref 0–0.2)
BILIRUB SERPL-MCNC: 0.4 MG/DL (ref 0.1–2)
BUN BLD-MCNC: 10 MG/DL (ref 7–18)
BUN/CREAT SERPL: 20.8 (ref 10–20)
CALCIUM BLD-MCNC: 8.6 MG/DL (ref 8.5–10.1)
CHLORIDE SERPL-SCNC: 110 MMOL/L (ref 98–112)
CO2 SERPL-SCNC: 25 MMOL/L (ref 21–32)
CREAT BLD-MCNC: 0.48 MG/DL
CRP SERPL-MCNC: <0.29 MG/DL (ref ?–0.3)
DEPRECATED RDW RBC AUTO: 53.7 FL (ref 35.1–46.3)
EOSINOPHIL # BLD AUTO: 0.22 X10(3) UL (ref 0–0.7)
EOSINOPHIL NFR BLD AUTO: 2.5 %
ERYTHROCYTE [DISTWIDTH] IN BLOOD BY AUTOMATED COUNT: 15.5 % (ref 11–15)
EST. AVERAGE GLUCOSE BLD GHB EST-MCNC: 171 MG/DL (ref 68–126)
GLOBULIN PLAS-MCNC: 3.3 G/DL (ref 2.8–4.4)
GLUCOSE BLD-MCNC: 114 MG/DL (ref 70–99)
HBA1C MFR BLD HPLC: 7.6 % (ref ?–5.7)
HCT VFR BLD AUTO: 36.6 %
HGB BLD-MCNC: 11.7 G/DL
IMM GRANULOCYTES # BLD AUTO: 0.01 X10(3) UL (ref 0–1)
IMM GRANULOCYTES NFR BLD: 0.1 %
LYMPHOCYTES # BLD AUTO: 3.69 X10(3) UL (ref 1–4)
LYMPHOCYTES NFR BLD AUTO: 42.2 %
M PROTEIN MFR SERPL ELPH: 6.4 G/DL (ref 6.4–8.2)
MCH RBC QN AUTO: 30.5 PG (ref 26–34)
MCHC RBC AUTO-ENTMCNC: 32 G/DL (ref 31–37)
MCV RBC AUTO: 95.6 FL
MONOCYTES # BLD AUTO: 1.11 X10(3) UL (ref 0.1–1)
MONOCYTES NFR BLD AUTO: 12.7 %
NEUTROPHILS # BLD AUTO: 3.68 X10 (3) UL (ref 1.5–7.7)
NEUTROPHILS # BLD AUTO: 3.68 X10(3) UL (ref 1.5–7.7)
NEUTROPHILS NFR BLD AUTO: 42 %
OSMOLALITY SERPL CALC.SUM OF ELEC: 294 MOSM/KG (ref 275–295)
PATIENT FASTING Y/N/NP: YES
PLATELET # BLD AUTO: 220 10(3)UL (ref 150–450)
POTASSIUM SERPL-SCNC: 3.6 MMOL/L (ref 3.5–5.1)
RBC # BLD AUTO: 3.83 X10(6)UL
SED RATE-ML: 21 MM/HR
SODIUM SERPL-SCNC: 142 MMOL/L (ref 136–145)
VIT B12 SERPL-MCNC: 672 PG/ML (ref 193–986)
VIT D+METAB SERPL-MCNC: 50.5 NG/ML (ref 30–100)
WBC # BLD AUTO: 8.8 X10(3) UL (ref 4–11)

## 2021-07-06 PROCEDURE — 83036 HEMOGLOBIN GLYCOSYLATED A1C: CPT

## 2021-07-06 PROCEDURE — 85025 COMPLETE CBC W/AUTO DIFF WBC: CPT

## 2021-07-06 PROCEDURE — 86140 C-REACTIVE PROTEIN: CPT

## 2021-07-06 PROCEDURE — 82248 BILIRUBIN DIRECT: CPT

## 2021-07-06 PROCEDURE — 85652 RBC SED RATE AUTOMATED: CPT

## 2021-07-06 PROCEDURE — 36415 COLL VENOUS BLD VENIPUNCTURE: CPT

## 2021-07-06 PROCEDURE — 82607 VITAMIN B-12: CPT

## 2021-07-06 PROCEDURE — 82306 VITAMIN D 25 HYDROXY: CPT

## 2021-07-06 PROCEDURE — 80053 COMPREHEN METABOLIC PANEL: CPT

## 2021-07-07 NOTE — PROGRESS NOTES
Monitoring labs are stable. Sugar is mildly elevated. If you were fasting, please discuss with your PCP. Continue current treatment plan. Repeat labs every 3 months. Follow up as planned.

## 2021-07-13 ENCOUNTER — NURSE ONLY (OUTPATIENT)
Dept: LAB | Age: 81
End: 2021-07-13
Attending: INTERNAL MEDICINE
Payer: MEDICARE

## 2021-07-13 DIAGNOSIS — I10 ESSENTIAL HYPERTENSION: ICD-10-CM

## 2021-07-13 DIAGNOSIS — E78.49 OTHER HYPERLIPIDEMIA: ICD-10-CM

## 2021-07-13 DIAGNOSIS — M15.9 PRIMARY OSTEOARTHRITIS INVOLVING MULTIPLE JOINTS: ICD-10-CM

## 2021-07-13 LAB
ALBUMIN SERPL-MCNC: 3.2 G/DL (ref 3.4–5)
ALBUMIN/GLOB SERPL: 1 {RATIO} (ref 1–2)
ALP LIVER SERPL-CCNC: 92 U/L
ALT SERPL-CCNC: 23 U/L
ANION GAP SERPL CALC-SCNC: 7 MMOL/L (ref 0–18)
AST SERPL-CCNC: 20 U/L (ref 15–37)
BASOPHILS # BLD AUTO: 0.04 X10(3) UL (ref 0–0.2)
BASOPHILS NFR BLD AUTO: 0.5 %
BILIRUB SERPL-MCNC: 0.3 MG/DL (ref 0.1–2)
BUN BLD-MCNC: 18 MG/DL (ref 7–18)
BUN/CREAT SERPL: 31 (ref 10–20)
CALCIUM BLD-MCNC: 9.6 MG/DL (ref 8.5–10.1)
CHLORIDE SERPL-SCNC: 107 MMOL/L (ref 98–112)
CO2 SERPL-SCNC: 27 MMOL/L (ref 21–32)
CREAT BLD-MCNC: 0.58 MG/DL
DEPRECATED RDW RBC AUTO: 52.5 FL (ref 35.1–46.3)
EOSINOPHIL # BLD AUTO: 0.23 X10(3) UL (ref 0–0.7)
EOSINOPHIL NFR BLD AUTO: 2.8 %
ERYTHROCYTE [DISTWIDTH] IN BLOOD BY AUTOMATED COUNT: 15.3 % (ref 11–15)
GLOBULIN PLAS-MCNC: 3.3 G/DL (ref 2.8–4.4)
GLUCOSE BLD-MCNC: 143 MG/DL (ref 70–99)
HCT VFR BLD AUTO: 36.3 %
HGB BLD-MCNC: 12 G/DL
IMM GRANULOCYTES # BLD AUTO: 0.02 X10(3) UL (ref 0–1)
IMM GRANULOCYTES NFR BLD: 0.2 %
LYMPHOCYTES # BLD AUTO: 3.92 X10(3) UL (ref 1–4)
LYMPHOCYTES NFR BLD AUTO: 46.9 %
M PROTEIN MFR SERPL ELPH: 6.5 G/DL (ref 6.4–8.2)
MCH RBC QN AUTO: 31.1 PG (ref 26–34)
MCHC RBC AUTO-ENTMCNC: 33.1 G/DL (ref 31–37)
MCV RBC AUTO: 94 FL
MONOCYTES # BLD AUTO: 1.08 X10(3) UL (ref 0.1–1)
MONOCYTES NFR BLD AUTO: 12.9 %
NEUTROPHILS # BLD AUTO: 3.06 X10 (3) UL (ref 1.5–7.7)
NEUTROPHILS # BLD AUTO: 3.06 X10(3) UL (ref 1.5–7.7)
NEUTROPHILS NFR BLD AUTO: 36.7 %
OSMOLALITY SERPL CALC.SUM OF ELEC: 296 MOSM/KG (ref 275–295)
PATIENT FASTING Y/N/NP: YES
PLATELET # BLD AUTO: 238 10(3)UL (ref 150–450)
POTASSIUM SERPL-SCNC: 4 MMOL/L (ref 3.5–5.1)
RBC # BLD AUTO: 3.86 X10(6)UL
SODIUM SERPL-SCNC: 141 MMOL/L (ref 136–145)
WBC # BLD AUTO: 8.4 X10(3) UL (ref 4–11)

## 2021-07-13 PROCEDURE — 85025 COMPLETE CBC W/AUTO DIFF WBC: CPT

## 2021-07-13 PROCEDURE — 80053 COMPREHEN METABOLIC PANEL: CPT

## 2021-07-13 PROCEDURE — 36415 COLL VENOUS BLD VENIPUNCTURE: CPT

## 2021-07-14 ENCOUNTER — HOSPITAL ENCOUNTER (EMERGENCY)
Facility: HOSPITAL | Age: 81
Discharge: HOME | End: 2021-07-14
Attending: EMERGENCY MEDICINE
Payer: MEDICARE

## 2021-07-14 ENCOUNTER — APPOINTMENT (OUTPATIENT)
Dept: GENERAL RADIOLOGY | Facility: HOSPITAL | Age: 81
End: 2021-07-14
Attending: EMERGENCY MEDICINE
Payer: MEDICARE

## 2021-07-14 VITALS
SYSTOLIC BLOOD PRESSURE: 146 MMHG | HEART RATE: 72 BPM | RESPIRATION RATE: 18 BRPM | WEIGHT: 200 LBS | BODY MASS INDEX: 34.15 KG/M2 | HEIGHT: 64 IN | OXYGEN SATURATION: 97 % | TEMPERATURE: 98 F | DIASTOLIC BLOOD PRESSURE: 97 MMHG

## 2021-07-14 DIAGNOSIS — S92.901A CLOSED FRACTURE OF RIGHT FOOT, INITIAL ENCOUNTER: Primary | ICD-10-CM

## 2021-07-14 PROCEDURE — 99284 EMERGENCY DEPT VISIT MOD MDM: CPT

## 2021-07-14 PROCEDURE — 73630 X-RAY EXAM OF FOOT: CPT | Performed by: EMERGENCY MEDICINE

## 2021-07-14 PROCEDURE — 73560 X-RAY EXAM OF KNEE 1 OR 2: CPT | Performed by: EMERGENCY MEDICINE

## 2021-07-15 NOTE — ED PROVIDER NOTES
Patient Seen in: [facility] Emergency Department      History   Patient presents with:  Leg or Foot Injury    Stated Complaint: BROKEN TOE    HPI/Subjective:   HPI    47-year-old female here from nursing home after excellently pushing wrong button o systems reviewed and negative except as noted above.     Physical Exam     ED Triage Vitals [07/14/21 2019]   /87   Pulse 84   Resp 18   Temp 98.4 °F (36.9 °C)   Temp src Temporal   SpO2 99 %   O2 Device None (Room air)       Current:/87   Pulse

## 2021-07-15 NOTE — ED INITIAL ASSESSMENT (HPI)
PT TO ED FROM [facility] WITH C/O 3RD RIGHT TOE PAIN. PT HIT TOE ON WALL WITH ELECTRIC CHAIR LAST NIGHT, XRAYS DONE; BROKEN.

## 2021-07-27 PROBLEM — S92.301A: Status: ACTIVE | Noted: 2021-07-27

## 2021-07-27 PROBLEM — M17.12 PRIMARY OSTEOARTHRITIS OF LEFT KNEE: Status: ACTIVE | Noted: 2021-07-27

## 2021-08-31 ENCOUNTER — NURSE ONLY (OUTPATIENT)
Dept: LAB | Age: 81
End: 2021-08-31
Attending: INTERNAL MEDICINE
Payer: MEDICARE

## 2021-08-31 DIAGNOSIS — I10 ESSENTIAL HYPERTENSION: ICD-10-CM

## 2021-08-31 DIAGNOSIS — I65.23 BILATERAL CAROTID ARTERY STENOSIS: ICD-10-CM

## 2021-08-31 DIAGNOSIS — R76.12 POSITIVE QUANTIFERON-TB GOLD TEST: ICD-10-CM

## 2021-08-31 LAB
ALBUMIN SERPL-MCNC: 3.1 G/DL (ref 3.4–5)
ALBUMIN/GLOB SERPL: 0.9 {RATIO} (ref 1–2)
ALP LIVER SERPL-CCNC: 59 U/L
ALT SERPL-CCNC: 22 U/L
ANION GAP SERPL CALC-SCNC: 6 MMOL/L (ref 0–18)
AST SERPL-CCNC: 18 U/L (ref 15–37)
BASOPHILS # BLD AUTO: 0.04 X10(3) UL (ref 0–0.2)
BASOPHILS NFR BLD AUTO: 0.5 %
BILIRUB SERPL-MCNC: 0.4 MG/DL (ref 0.1–2)
BUN BLD-MCNC: 18 MG/DL (ref 7–18)
CALCIUM BLD-MCNC: 9.2 MG/DL (ref 8.5–10.1)
CHLORIDE SERPL-SCNC: 107 MMOL/L (ref 98–112)
CO2 SERPL-SCNC: 28 MMOL/L (ref 21–32)
CREAT BLD-MCNC: 0.55 MG/DL
EOSINOPHIL # BLD AUTO: 0.18 X10(3) UL (ref 0–0.7)
EOSINOPHIL NFR BLD AUTO: 2.3 %
ERYTHROCYTE [DISTWIDTH] IN BLOOD BY AUTOMATED COUNT: 15.7 %
GLOBULIN PLAS-MCNC: 3.3 G/DL (ref 2.8–4.4)
GLUCOSE BLD-MCNC: 77 MG/DL (ref 70–99)
HCT VFR BLD AUTO: 36.4 %
HGB BLD-MCNC: 11.5 G/DL
IMM GRANULOCYTES # BLD AUTO: 0.01 X10(3) UL (ref 0–1)
IMM GRANULOCYTES NFR BLD: 0.1 %
LYMPHOCYTES # BLD AUTO: 4.04 X10(3) UL (ref 1–4)
LYMPHOCYTES NFR BLD AUTO: 52.5 %
M PROTEIN MFR SERPL ELPH: 6.4 G/DL (ref 6.4–8.2)
MCH RBC QN AUTO: 30.7 PG (ref 26–34)
MCHC RBC AUTO-ENTMCNC: 31.6 G/DL (ref 31–37)
MCV RBC AUTO: 97.3 FL
MONOCYTES # BLD AUTO: 0.69 X10(3) UL (ref 0.1–1)
MONOCYTES NFR BLD AUTO: 9 %
NEUTROPHILS # BLD AUTO: 2.74 X10 (3) UL (ref 1.5–7.7)
NEUTROPHILS # BLD AUTO: 2.74 X10(3) UL (ref 1.5–7.7)
NEUTROPHILS NFR BLD AUTO: 35.6 %
OSMOLALITY SERPL CALC.SUM OF ELEC: 293 MOSM/KG (ref 275–295)
PATIENT FASTING Y/N/NP: YES
PLATELET # BLD AUTO: 241 10(3)UL (ref 150–450)
POTASSIUM SERPL-SCNC: 3.4 MMOL/L (ref 3.5–5.1)
RBC # BLD AUTO: 3.74 X10(6)UL
SODIUM SERPL-SCNC: 141 MMOL/L (ref 136–145)
WBC # BLD AUTO: 7.7 X10(3) UL (ref 4–11)

## 2021-08-31 PROCEDURE — 36415 COLL VENOUS BLD VENIPUNCTURE: CPT

## 2021-08-31 PROCEDURE — 85025 COMPLETE CBC W/AUTO DIFF WBC: CPT

## 2021-08-31 PROCEDURE — 80053 COMPREHEN METABOLIC PANEL: CPT

## 2021-09-07 ENCOUNTER — NURSE ONLY (OUTPATIENT)
Dept: LAB | Age: 81
End: 2021-09-07
Attending: INTERNAL MEDICINE
Payer: MEDICARE

## 2021-09-07 DIAGNOSIS — M15.9 PRIMARY OSTEOARTHRITIS INVOLVING MULTIPLE JOINTS: ICD-10-CM

## 2021-09-07 DIAGNOSIS — I10 ESSENTIAL HYPERTENSION: ICD-10-CM

## 2021-09-07 DIAGNOSIS — E87.6 HYPOKALEMIA: ICD-10-CM

## 2021-09-07 LAB
ALBUMIN SERPL-MCNC: 3 G/DL (ref 3.4–5)
ALBUMIN/GLOB SERPL: 0.9 {RATIO} (ref 1–2)
ALP LIVER SERPL-CCNC: 63 U/L
ALT SERPL-CCNC: 27 U/L
ANION GAP SERPL CALC-SCNC: 7 MMOL/L (ref 0–18)
AST SERPL-CCNC: 17 U/L (ref 15–37)
BASOPHILS # BLD AUTO: 0.04 X10(3) UL (ref 0–0.2)
BASOPHILS NFR BLD AUTO: 0.5 %
BILIRUB SERPL-MCNC: 0.5 MG/DL (ref 0.1–2)
BUN BLD-MCNC: 19 MG/DL (ref 7–18)
CALCIUM BLD-MCNC: 9.1 MG/DL (ref 8.5–10.1)
CHLORIDE SERPL-SCNC: 109 MMOL/L (ref 98–112)
CO2 SERPL-SCNC: 26 MMOL/L (ref 21–32)
CREAT BLD-MCNC: 0.54 MG/DL
EOSINOPHIL # BLD AUTO: 0.18 X10(3) UL (ref 0–0.7)
EOSINOPHIL NFR BLD AUTO: 2.2 %
ERYTHROCYTE [DISTWIDTH] IN BLOOD BY AUTOMATED COUNT: 15.6 %
GLOBULIN PLAS-MCNC: 3.4 G/DL (ref 2.8–4.4)
GLUCOSE BLD-MCNC: 70 MG/DL (ref 70–99)
HCT VFR BLD AUTO: 36 %
HGB BLD-MCNC: 11.6 G/DL
IMM GRANULOCYTES # BLD AUTO: 0.01 X10(3) UL (ref 0–1)
IMM GRANULOCYTES NFR BLD: 0.1 %
LYMPHOCYTES # BLD AUTO: 4.05 X10(3) UL (ref 1–4)
LYMPHOCYTES NFR BLD AUTO: 50.3 %
M PROTEIN MFR SERPL ELPH: 6.4 G/DL (ref 6.4–8.2)
MCH RBC QN AUTO: 30.7 PG (ref 26–34)
MCHC RBC AUTO-ENTMCNC: 32.2 G/DL (ref 31–37)
MCV RBC AUTO: 95.2 FL
MONOCYTES # BLD AUTO: 0.76 X10(3) UL (ref 0.1–1)
MONOCYTES NFR BLD AUTO: 9.4 %
NEUTROPHILS # BLD AUTO: 3.01 X10 (3) UL (ref 1.5–7.7)
NEUTROPHILS # BLD AUTO: 3.01 X10(3) UL (ref 1.5–7.7)
NEUTROPHILS NFR BLD AUTO: 37.5 %
OSMOLALITY SERPL CALC.SUM OF ELEC: 295 MOSM/KG (ref 275–295)
PATIENT FASTING Y/N/NP: YES
PLATELET # BLD AUTO: 219 10(3)UL (ref 150–450)
POTASSIUM SERPL-SCNC: 3.6 MMOL/L (ref 3.5–5.1)
RBC # BLD AUTO: 3.78 X10(6)UL
SODIUM SERPL-SCNC: 142 MMOL/L (ref 136–145)
WBC # BLD AUTO: 8.1 X10(3) UL (ref 4–11)

## 2021-09-07 PROCEDURE — 80053 COMPREHEN METABOLIC PANEL: CPT

## 2021-09-07 PROCEDURE — 36415 COLL VENOUS BLD VENIPUNCTURE: CPT

## 2021-09-07 PROCEDURE — 85025 COMPLETE CBC W/AUTO DIFF WBC: CPT

## 2021-09-14 ENCOUNTER — NURSE ONLY (OUTPATIENT)
Dept: LAB | Age: 81
End: 2021-09-14
Attending: INTERNAL MEDICINE
Payer: MEDICARE

## 2021-09-14 DIAGNOSIS — E11.3299 MILD NONPROLIFERATIVE DIABETIC RETINOPATHY WITHOUT MACULAR EDEMA ASSOCIATED WITH TYPE 2 DIABETES MELLITUS, UNSPECIFIED LATERALITY (HCC): ICD-10-CM

## 2021-09-14 DIAGNOSIS — M15.9 PRIMARY OSTEOARTHRITIS INVOLVING MULTIPLE JOINTS: ICD-10-CM

## 2021-09-14 DIAGNOSIS — I10 ESSENTIAL HYPERTENSION: ICD-10-CM

## 2021-09-14 LAB
ALBUMIN SERPL-MCNC: 2.9 G/DL (ref 3.4–5)
ALBUMIN/GLOB SERPL: 0.8 {RATIO} (ref 1–2)
ALP LIVER SERPL-CCNC: 60 U/L
ALT SERPL-CCNC: 24 U/L
ANION GAP SERPL CALC-SCNC: 5 MMOL/L (ref 0–18)
AST SERPL-CCNC: 17 U/L (ref 15–37)
BASOPHILS # BLD AUTO: 0.05 X10(3) UL (ref 0–0.2)
BASOPHILS NFR BLD AUTO: 0.7 %
BILIRUB SERPL-MCNC: 0.4 MG/DL (ref 0.1–2)
BUN BLD-MCNC: 15 MG/DL (ref 7–18)
CALCIUM BLD-MCNC: 9.4 MG/DL (ref 8.5–10.1)
CHLORIDE SERPL-SCNC: 107 MMOL/L (ref 98–112)
CO2 SERPL-SCNC: 29 MMOL/L (ref 21–32)
CREAT BLD-MCNC: 0.62 MG/DL
EOSINOPHIL # BLD AUTO: 0.24 X10(3) UL (ref 0–0.7)
EOSINOPHIL NFR BLD AUTO: 3.4 %
ERYTHROCYTE [DISTWIDTH] IN BLOOD BY AUTOMATED COUNT: 15.2 %
GLOBULIN PLAS-MCNC: 3.6 G/DL (ref 2.8–4.4)
GLUCOSE BLD-MCNC: 97 MG/DL (ref 70–99)
HCT VFR BLD AUTO: 35.9 %
HGB BLD-MCNC: 11.6 G/DL
IMM GRANULOCYTES # BLD AUTO: 0.01 X10(3) UL (ref 0–1)
IMM GRANULOCYTES NFR BLD: 0.1 %
LYMPHOCYTES # BLD AUTO: 3.91 X10(3) UL (ref 1–4)
LYMPHOCYTES NFR BLD AUTO: 56 %
MCH RBC QN AUTO: 30.9 PG (ref 26–34)
MCHC RBC AUTO-ENTMCNC: 32.3 G/DL (ref 31–37)
MCV RBC AUTO: 95.7 FL
MONOCYTES # BLD AUTO: 0.61 X10(3) UL (ref 0.1–1)
MONOCYTES NFR BLD AUTO: 8.7 %
NEUTROPHILS # BLD AUTO: 2.16 X10 (3) UL (ref 1.5–7.7)
NEUTROPHILS # BLD AUTO: 2.16 X10(3) UL (ref 1.5–7.7)
NEUTROPHILS NFR BLD AUTO: 31.1 %
OSMOLALITY SERPL CALC.SUM OF ELEC: 293 MOSM/KG (ref 275–295)
PATIENT FASTING Y/N/NP: YES
PLATELET # BLD AUTO: 208 10(3)UL (ref 150–450)
POTASSIUM SERPL-SCNC: 3.6 MMOL/L (ref 3.5–5.1)
PROT SERPL-MCNC: 6.5 G/DL (ref 6.4–8.2)
RBC # BLD AUTO: 3.75 X10(6)UL
SODIUM SERPL-SCNC: 141 MMOL/L (ref 136–145)
WBC # BLD AUTO: 7 X10(3) UL (ref 4–11)

## 2021-09-14 PROCEDURE — 36415 COLL VENOUS BLD VENIPUNCTURE: CPT

## 2021-09-14 PROCEDURE — 80053 COMPREHEN METABOLIC PANEL: CPT

## 2021-09-14 PROCEDURE — 85025 COMPLETE CBC W/AUTO DIFF WBC: CPT

## 2021-09-21 ENCOUNTER — NURSE ONLY (OUTPATIENT)
Dept: LAB | Age: 81
End: 2021-09-21
Attending: INTERNAL MEDICINE
Payer: MEDICARE

## 2021-09-21 DIAGNOSIS — M06.00 SERONEGATIVE RHEUMATOID ARTHRITIS (HCC): ICD-10-CM

## 2021-09-21 DIAGNOSIS — M15.9 PRIMARY OSTEOARTHRITIS INVOLVING MULTIPLE JOINTS: ICD-10-CM

## 2021-09-21 DIAGNOSIS — R60.0 LOCALIZED EDEMA: ICD-10-CM

## 2021-09-21 DIAGNOSIS — R60.0 EDEMA, LEG: ICD-10-CM

## 2021-09-21 DIAGNOSIS — I10 ESSENTIAL HYPERTENSION: ICD-10-CM

## 2021-09-21 DIAGNOSIS — I27.20 PULMONARY HYPERTENSION (HCC): ICD-10-CM

## 2021-09-21 DIAGNOSIS — E11.21 TYPE 2 DIABETES MELLITUS WITH DIABETIC NEPHROPATHY, WITHOUT LONG-TERM CURRENT USE OF INSULIN (HCC): ICD-10-CM

## 2021-09-21 LAB
ALBUMIN SERPL-MCNC: 3 G/DL (ref 3.4–5)
ALBUMIN/GLOB SERPL: 0.9 {RATIO} (ref 1–2)
ALP LIVER SERPL-CCNC: 79 U/L
ALT SERPL-CCNC: 20 U/L
ANION GAP SERPL CALC-SCNC: 5 MMOL/L (ref 0–18)
AST SERPL-CCNC: 17 U/L (ref 15–37)
BASOPHILS # BLD AUTO: 0.06 X10(3) UL (ref 0–0.2)
BASOPHILS NFR BLD AUTO: 0.8 %
BILIRUB SERPL-MCNC: 0.4 MG/DL (ref 0.1–2)
BUN BLD-MCNC: 17 MG/DL (ref 7–18)
CALCIUM BLD-MCNC: 9.5 MG/DL (ref 8.5–10.1)
CHLORIDE SERPL-SCNC: 106 MMOL/L (ref 98–112)
CO2 SERPL-SCNC: 28 MMOL/L (ref 21–32)
CREAT BLD-MCNC: 0.55 MG/DL
EOSINOPHIL # BLD AUTO: 0.28 X10(3) UL (ref 0–0.7)
EOSINOPHIL NFR BLD AUTO: 3.6 %
ERYTHROCYTE [DISTWIDTH] IN BLOOD BY AUTOMATED COUNT: 15.3 %
GLOBULIN PLAS-MCNC: 3.5 G/DL (ref 2.8–4.4)
GLUCOSE BLD-MCNC: 105 MG/DL (ref 70–99)
HCT VFR BLD AUTO: 36.3 %
HGB BLD-MCNC: 11.9 G/DL
IMM GRANULOCYTES # BLD AUTO: 0.01 X10(3) UL (ref 0–1)
IMM GRANULOCYTES NFR BLD: 0.1 %
LYMPHOCYTES # BLD AUTO: 4.3 X10(3) UL (ref 1–4)
LYMPHOCYTES NFR BLD AUTO: 55.1 %
MCH RBC QN AUTO: 31.1 PG (ref 26–34)
MCHC RBC AUTO-ENTMCNC: 32.8 G/DL (ref 31–37)
MCV RBC AUTO: 94.8 FL
MONOCYTES # BLD AUTO: 0.75 X10(3) UL (ref 0.1–1)
MONOCYTES NFR BLD AUTO: 9.6 %
NEUTROPHILS # BLD AUTO: 2.4 X10 (3) UL (ref 1.5–7.7)
NEUTROPHILS # BLD AUTO: 2.4 X10(3) UL (ref 1.5–7.7)
NEUTROPHILS NFR BLD AUTO: 30.8 %
OSMOLALITY SERPL CALC.SUM OF ELEC: 290 MOSM/KG (ref 275–295)
PATIENT FASTING Y/N/NP: YES
PLATELET # BLD AUTO: 222 10(3)UL (ref 150–450)
POTASSIUM SERPL-SCNC: 3.9 MMOL/L (ref 3.5–5.1)
PROT SERPL-MCNC: 6.5 G/DL (ref 6.4–8.2)
RBC # BLD AUTO: 3.83 X10(6)UL
SODIUM SERPL-SCNC: 139 MMOL/L (ref 136–145)
WBC # BLD AUTO: 7.8 X10(3) UL (ref 4–11)

## 2021-09-21 PROCEDURE — 80053 COMPREHEN METABOLIC PANEL: CPT

## 2021-09-21 PROCEDURE — 85025 COMPLETE CBC W/AUTO DIFF WBC: CPT

## 2021-09-21 PROCEDURE — 36415 COLL VENOUS BLD VENIPUNCTURE: CPT

## 2021-10-07 ENCOUNTER — NURSE ONLY (OUTPATIENT)
Dept: LAB | Age: 81
End: 2021-10-07
Attending: INTERNAL MEDICINE
Payer: MEDICARE

## 2021-10-07 DIAGNOSIS — E11.42 TYPE 2 DIABETES MELLITUS WITH DIABETIC POLYNEUROPATHY, WITHOUT LONG-TERM CURRENT USE OF INSULIN (HCC): ICD-10-CM

## 2021-10-07 PROCEDURE — 83036 HEMOGLOBIN GLYCOSYLATED A1C: CPT

## 2021-10-07 PROCEDURE — 36415 COLL VENOUS BLD VENIPUNCTURE: CPT

## 2021-10-14 ENCOUNTER — NURSE ONLY (OUTPATIENT)
Dept: LAB | Age: 81
End: 2021-10-14
Attending: INTERNAL MEDICINE
Payer: MEDICARE

## 2021-10-14 DIAGNOSIS — Z79.899 ENCOUNTER FOR LONG-TERM (CURRENT) USE OF HIGH-RISK MEDICATION: ICD-10-CM

## 2021-10-14 DIAGNOSIS — M80.00XD AGE-RELATED OSTEOPOROSIS WITH CURRENT PATHOLOGICAL FRACTURE WITH ROUTINE HEALING, SUBSEQUENT ENCOUNTER: ICD-10-CM

## 2021-10-14 DIAGNOSIS — M06.00 SERONEGATIVE RHEUMATOID ARTHRITIS (HCC): ICD-10-CM

## 2021-10-14 DIAGNOSIS — E55.9 VITAMIN D DEFICIENCY: ICD-10-CM

## 2021-10-14 PROCEDURE — 84520 ASSAY OF UREA NITROGEN: CPT

## 2021-10-14 PROCEDURE — 86140 C-REACTIVE PROTEIN: CPT

## 2021-10-14 PROCEDURE — 82310 ASSAY OF CALCIUM: CPT

## 2021-10-14 PROCEDURE — 36415 COLL VENOUS BLD VENIPUNCTURE: CPT

## 2021-10-14 PROCEDURE — 85652 RBC SED RATE AUTOMATED: CPT

## 2021-10-14 PROCEDURE — 82565 ASSAY OF CREATININE: CPT

## 2021-10-14 PROCEDURE — 85025 COMPLETE CBC W/AUTO DIFF WBC: CPT

## 2021-10-14 PROCEDURE — 80076 HEPATIC FUNCTION PANEL: CPT

## 2021-10-15 NOTE — PROGRESS NOTES
Sandi,  Anemia persists but remains stable. Albumin is still low as well - will keep an eye on this. Other liver tests are normal.   Continue current treatment plan. Repeat labs every 3 months. Follow up as planned.   Dr. Donovan Farias

## 2022-01-06 ENCOUNTER — NURSE ONLY (OUTPATIENT)
Dept: LAB | Age: 82
DRG: 177 | End: 2022-01-06
Attending: INTERNAL MEDICINE
Payer: MEDICARE

## 2022-01-06 DIAGNOSIS — I10 ESSENTIAL HYPERTENSION, MALIGNANT: Primary | ICD-10-CM

## 2022-01-06 DIAGNOSIS — R00.2 PALPITATIONS: ICD-10-CM

## 2022-01-06 LAB
ALBUMIN SERPL-MCNC: 3.3 G/DL (ref 3.4–5)
ALBUMIN/GLOB SERPL: 1.1 {RATIO} (ref 1–2)
ALP LIVER SERPL-CCNC: 104 U/L
ALT SERPL-CCNC: 26 U/L
ANION GAP SERPL CALC-SCNC: 4 MMOL/L (ref 0–18)
AST SERPL-CCNC: 14 U/L (ref 15–37)
BASOPHILS # BLD AUTO: 0.05 X10(3) UL (ref 0–0.2)
BASOPHILS NFR BLD AUTO: 0.6 %
BILIRUB SERPL-MCNC: 0.3 MG/DL (ref 0.1–2)
BUN BLD-MCNC: 18 MG/DL (ref 7–18)
CALCIUM BLD-MCNC: 9.7 MG/DL (ref 8.5–10.1)
CHLORIDE SERPL-SCNC: 107 MMOL/L (ref 98–112)
CO2 SERPL-SCNC: 29 MMOL/L (ref 21–32)
CREAT BLD-MCNC: 0.71 MG/DL
EOSINOPHIL # BLD AUTO: 0.36 X10(3) UL (ref 0–0.7)
EOSINOPHIL NFR BLD AUTO: 4.6 %
ERYTHROCYTE [DISTWIDTH] IN BLOOD BY AUTOMATED COUNT: 15.2 %
EST. AVERAGE GLUCOSE BLD GHB EST-MCNC: 166 MG/DL (ref 68–126)
FASTING STATUS PATIENT QL REPORTED: YES
GLOBULIN PLAS-MCNC: 3.1 G/DL (ref 2.8–4.4)
GLUCOSE BLD-MCNC: 129 MG/DL (ref 70–99)
HBA1C MFR BLD: 7.4 % (ref ?–5.7)
HCT VFR BLD AUTO: 36.6 %
HGB BLD-MCNC: 11.8 G/DL
IMM GRANULOCYTES # BLD AUTO: 0.01 X10(3) UL (ref 0–1)
IMM GRANULOCYTES NFR BLD: 0.1 %
LYMPHOCYTES # BLD AUTO: 2.79 X10(3) UL (ref 1–4)
LYMPHOCYTES NFR BLD AUTO: 35.6 %
MCH RBC QN AUTO: 30.5 PG (ref 26–34)
MCHC RBC AUTO-ENTMCNC: 32.2 G/DL (ref 31–37)
MCV RBC AUTO: 94.6 FL
MONOCYTES # BLD AUTO: 0.91 X10(3) UL (ref 0.1–1)
MONOCYTES NFR BLD AUTO: 11.6 %
NEUTROPHILS # BLD AUTO: 3.72 X10 (3) UL (ref 1.5–7.7)
NEUTROPHILS # BLD AUTO: 3.72 X10(3) UL (ref 1.5–7.7)
NEUTROPHILS NFR BLD AUTO: 47.5 %
OSMOLALITY SERPL CALC.SUM OF ELEC: 294 MOSM/KG (ref 275–295)
PLATELET # BLD AUTO: 225 10(3)UL (ref 150–450)
POTASSIUM SERPL-SCNC: 4.7 MMOL/L (ref 3.5–5.1)
PROT SERPL-MCNC: 6.4 G/DL (ref 6.4–8.2)
RBC # BLD AUTO: 3.87 X10(6)UL
SODIUM SERPL-SCNC: 140 MMOL/L (ref 136–145)
WBC # BLD AUTO: 7.8 X10(3) UL (ref 4–11)

## 2022-01-06 PROCEDURE — 36415 COLL VENOUS BLD VENIPUNCTURE: CPT

## 2022-01-06 PROCEDURE — 83036 HEMOGLOBIN GLYCOSYLATED A1C: CPT

## 2022-01-06 PROCEDURE — 80053 COMPREHEN METABOLIC PANEL: CPT

## 2022-01-06 PROCEDURE — 85025 COMPLETE CBC W/AUTO DIFF WBC: CPT

## 2022-01-08 ENCOUNTER — APPOINTMENT (OUTPATIENT)
Dept: GENERAL RADIOLOGY | Facility: HOSPITAL | Age: 82
DRG: 177 | End: 2022-01-08
Attending: EMERGENCY MEDICINE
Payer: MEDICARE

## 2022-01-08 ENCOUNTER — HOSPITAL ENCOUNTER (INPATIENT)
Facility: HOSPITAL | Age: 82
LOS: 3 days | Discharge: NURSING, REHAB OR LONG-TERM CARE | DRG: 177 | End: 2022-01-11
Attending: EMERGENCY MEDICINE | Admitting: INTERNAL MEDICINE
Payer: MEDICARE

## 2022-01-08 DIAGNOSIS — J12.82 PNEUMONIA DUE TO COVID-19 VIRUS: Primary | ICD-10-CM

## 2022-01-08 DIAGNOSIS — U07.1 PNEUMONIA DUE TO COVID-19 VIRUS: Primary | ICD-10-CM

## 2022-01-08 DIAGNOSIS — R09.02 HYPOXIA: ICD-10-CM

## 2022-01-08 LAB
ALBUMIN SERPL-MCNC: 3.5 G/DL (ref 3.4–5)
ALBUMIN/GLOB SERPL: 1.1 {RATIO} (ref 1–2)
ALP LIVER SERPL-CCNC: 69 U/L
ALT SERPL-CCNC: 25 U/L
ANION GAP SERPL CALC-SCNC: 7 MMOL/L (ref 0–18)
AST SERPL-CCNC: 42 U/L (ref 15–37)
ATRIAL RATE: 68 BPM
ATRIAL RATE: 70 BPM
BASOPHILS # BLD AUTO: 0.01 X10(3) UL (ref 0–0.2)
BASOPHILS NFR BLD AUTO: 0.2 %
BILIRUB SERPL-MCNC: 0.4 MG/DL (ref 0.1–2)
BUN BLD-MCNC: 15 MG/DL (ref 7–18)
CALCIUM BLD-MCNC: 9 MG/DL (ref 8.5–10.1)
CHLORIDE SERPL-SCNC: 104 MMOL/L (ref 98–112)
CO2 SERPL-SCNC: 27 MMOL/L (ref 21–32)
CREAT BLD-MCNC: 0.96 MG/DL
CRP SERPL-MCNC: 0.87 MG/DL (ref ?–0.3)
D DIMER PPP FEU-MCNC: 0.58 UG/ML FEU (ref ?–0.83)
DEPRECATED HBV CORE AB SER IA-ACNC: 56.1 NG/ML
EOSINOPHIL # BLD AUTO: 0.01 X10(3) UL (ref 0–0.7)
EOSINOPHIL NFR BLD AUTO: 0.2 %
ERYTHROCYTE [DISTWIDTH] IN BLOOD BY AUTOMATED COUNT: 15.2 %
GLOBULIN PLAS-MCNC: 3.3 G/DL (ref 2.8–4.4)
GLUCOSE BLD-MCNC: 132 MG/DL (ref 70–99)
GLUCOSE BLD-MCNC: 198 MG/DL (ref 70–99)
HCT VFR BLD AUTO: 37.8 %
HGB BLD-MCNC: 11.7 G/DL
IMM GRANULOCYTES # BLD AUTO: 0.01 X10(3) UL (ref 0–1)
IMM GRANULOCYTES NFR BLD: 0.2 %
INR BLD: 1.05 (ref 0.8–1.2)
LACTATE SERPL-SCNC: 1.1 MMOL/L (ref 0.4–2)
LACTATE SERPL-SCNC: 2.6 MMOL/L (ref 0.4–2)
LDH SERPL L TO P-CCNC: 226 U/L
LYMPHOCYTES # BLD AUTO: 1.6 X10(3) UL (ref 1–4)
LYMPHOCYTES NFR BLD AUTO: 37.5 %
MCH RBC QN AUTO: 29.3 PG (ref 26–34)
MCHC RBC AUTO-ENTMCNC: 31 G/DL (ref 31–37)
MCV RBC AUTO: 94.7 FL
MONOCYTES # BLD AUTO: 1.07 X10(3) UL (ref 0.1–1)
MONOCYTES NFR BLD AUTO: 25.1 %
NEUTROPHILS # BLD AUTO: 1.57 X10 (3) UL (ref 1.5–7.7)
NEUTROPHILS # BLD AUTO: 1.57 X10(3) UL (ref 1.5–7.7)
NEUTROPHILS NFR BLD AUTO: 36.8 %
NT-PROBNP SERPL-MCNC: 261 PG/ML (ref ?–450)
OSMOLALITY SERPL CALC.SUM OF ELEC: 292 MOSM/KG (ref 275–295)
P AXIS: 77 DEGREES
P AXIS: 83 DEGREES
P-R INTERVAL: 176 MS
P-R INTERVAL: 180 MS
PLATELET # BLD AUTO: 186 10(3)UL (ref 150–450)
POTASSIUM SERPL-SCNC: 3.5 MMOL/L (ref 3.5–5.1)
PROCALCITONIN SERPL-MCNC: 0.13 NG/ML (ref ?–0.16)
PROT SERPL-MCNC: 6.8 G/DL (ref 6.4–8.2)
PROTHROMBIN TIME: 13.7 SECONDS (ref 11.6–14.8)
Q-T INTERVAL: 398 MS
Q-T INTERVAL: 398 MS
QRS DURATION: 76 MS
QRS DURATION: 78 MS
QTC CALCULATION (BEZET): 423 MS
QTC CALCULATION (BEZET): 429 MS
R AXIS: -1 DEGREES
R AXIS: 0 DEGREES
RBC # BLD AUTO: 3.99 X10(6)UL
SARS-COV-2 RNA RESP QL NAA+PROBE: DETECTED
SODIUM SERPL-SCNC: 138 MMOL/L (ref 136–145)
T AXIS: 21 DEGREES
T AXIS: 25 DEGREES
TROPONIN I HIGH SENSITIVITY: 15 NG/L
VENTRICULAR RATE: 68 BPM
VENTRICULAR RATE: 70 BPM
WBC # BLD AUTO: 4.3 X10(3) UL (ref 4–11)

## 2022-01-08 PROCEDURE — 36415 COLL VENOUS BLD VENIPUNCTURE: CPT

## 2022-01-08 PROCEDURE — 80053 COMPREHEN METABOLIC PANEL: CPT | Performed by: EMERGENCY MEDICINE

## 2022-01-08 PROCEDURE — 83605 ASSAY OF LACTIC ACID: CPT | Performed by: EMERGENCY MEDICINE

## 2022-01-08 PROCEDURE — 71045 X-RAY EXAM CHEST 1 VIEW: CPT | Performed by: EMERGENCY MEDICINE

## 2022-01-08 PROCEDURE — 84145 PROCALCITONIN (PCT): CPT | Performed by: EMERGENCY MEDICINE

## 2022-01-08 PROCEDURE — 93010 ELECTROCARDIOGRAM REPORT: CPT

## 2022-01-08 PROCEDURE — 82728 ASSAY OF FERRITIN: CPT | Performed by: EMERGENCY MEDICINE

## 2022-01-08 PROCEDURE — 83880 ASSAY OF NATRIURETIC PEPTIDE: CPT | Performed by: EMERGENCY MEDICINE

## 2022-01-08 PROCEDURE — 83615 LACTATE (LD) (LDH) ENZYME: CPT | Performed by: EMERGENCY MEDICINE

## 2022-01-08 PROCEDURE — 87040 BLOOD CULTURE FOR BACTERIA: CPT | Performed by: EMERGENCY MEDICINE

## 2022-01-08 PROCEDURE — 82962 GLUCOSE BLOOD TEST: CPT

## 2022-01-08 PROCEDURE — 99285 EMERGENCY DEPT VISIT HI MDM: CPT

## 2022-01-08 PROCEDURE — 84484 ASSAY OF TROPONIN QUANT: CPT | Performed by: EMERGENCY MEDICINE

## 2022-01-08 PROCEDURE — 85610 PROTHROMBIN TIME: CPT | Performed by: EMERGENCY MEDICINE

## 2022-01-08 PROCEDURE — 85025 COMPLETE CBC W/AUTO DIFF WBC: CPT | Performed by: EMERGENCY MEDICINE

## 2022-01-08 PROCEDURE — 85379 FIBRIN DEGRADATION QUANT: CPT | Performed by: EMERGENCY MEDICINE

## 2022-01-08 PROCEDURE — 86140 C-REACTIVE PROTEIN: CPT | Performed by: EMERGENCY MEDICINE

## 2022-01-08 PROCEDURE — 93005 ELECTROCARDIOGRAM TRACING: CPT

## 2022-01-08 RX ORDER — DEXTROSE MONOHYDRATE 25 G/50ML
50 INJECTION, SOLUTION INTRAVENOUS
Status: DISCONTINUED | OUTPATIENT
Start: 2022-01-08 | End: 2022-01-11

## 2022-01-08 RX ORDER — ENOXAPARIN SODIUM 100 MG/ML
40 INJECTION SUBCUTANEOUS NIGHTLY
Status: DISCONTINUED | OUTPATIENT
Start: 2022-01-08 | End: 2022-01-11

## 2022-01-08 RX ORDER — ALBUTEROL SULFATE 90 UG/1
8 AEROSOL, METERED RESPIRATORY (INHALATION) 4 TIMES DAILY
Status: DISCONTINUED | OUTPATIENT
Start: 2022-01-08 | End: 2022-01-11

## 2022-01-08 RX ORDER — DEXAMETHASONE SODIUM PHOSPHATE 4 MG/ML
6 VIAL (ML) INJECTION ONCE
Status: DISCONTINUED | OUTPATIENT
Start: 2022-01-08 | End: 2022-01-08

## 2022-01-08 RX ORDER — AZITHROMYCIN 500 MG/1
500 TABLET, FILM COATED ORAL DAILY
COMMUNITY
Start: 2022-01-07 | End: 2022-01-11

## 2022-01-08 RX ORDER — GABAPENTIN 300 MG/1
300 CAPSULE ORAL 2 TIMES DAILY
Status: DISCONTINUED | OUTPATIENT
Start: 2022-01-08 | End: 2022-01-11

## 2022-01-08 RX ORDER — FLUTICASONE PROPIONATE 50 MCG
1 SPRAY, SUSPENSION (ML) NASAL DAILY
COMMUNITY

## 2022-01-08 RX ORDER — LOSARTAN POTASSIUM 100 MG/1
100 TABLET ORAL
Status: DISCONTINUED | OUTPATIENT
Start: 2022-01-09 | End: 2022-01-11

## 2022-01-08 RX ORDER — ATORVASTATIN CALCIUM 10 MG/1
10 TABLET, FILM COATED ORAL NIGHTLY
Status: DISCONTINUED | OUTPATIENT
Start: 2022-01-08 | End: 2022-01-11

## 2022-01-08 RX ORDER — MELATONIN
3 NIGHTLY
Status: DISCONTINUED | OUTPATIENT
Start: 2022-01-08 | End: 2022-01-11

## 2022-01-08 RX ORDER — PANTOPRAZOLE SODIUM 20 MG/1
20 TABLET, DELAYED RELEASE ORAL
Status: DISCONTINUED | OUTPATIENT
Start: 2022-01-09 | End: 2022-01-11

## 2022-01-08 RX ORDER — ASPIRIN 81 MG/1
81 TABLET ORAL 2 TIMES DAILY
Status: DISCONTINUED | OUTPATIENT
Start: 2022-01-08 | End: 2022-01-11

## 2022-01-08 RX ORDER — METOPROLOL SUCCINATE 100 MG/1
100 TABLET, EXTENDED RELEASE ORAL
Status: DISCONTINUED | OUTPATIENT
Start: 2022-01-08 | End: 2022-01-11

## 2022-01-08 RX ORDER — FOLIC ACID 1 MG/1
1 TABLET ORAL 2 TIMES DAILY
Status: DISCONTINUED | OUTPATIENT
Start: 2022-01-08 | End: 2022-01-11

## 2022-01-08 RX ORDER — FLUTICASONE PROPIONATE 50 MCG
1 SPRAY, SUSPENSION (ML) NASAL DAILY
Status: DISCONTINUED | OUTPATIENT
Start: 2022-01-08 | End: 2022-01-11

## 2022-01-08 RX ORDER — AMLODIPINE BESYLATE 5 MG/1
5 TABLET ORAL 2 TIMES DAILY
Status: DISCONTINUED | OUTPATIENT
Start: 2022-01-08 | End: 2022-01-11

## 2022-01-08 RX ORDER — FUROSEMIDE 40 MG/1
40 TABLET ORAL DAILY
Status: DISCONTINUED | OUTPATIENT
Start: 2022-01-08 | End: 2022-01-11

## 2022-01-08 NOTE — H&P
[facility] Hospitalist H&P       CC: Patient presents with:  Difficulty Breathing       PCP: Juani Newman MD    History of Present Illness:  Patient is a 78year old female with PMH sig for HTN, SHANA, Parkinsons, RA (on Remicade, methotrexate) Never Smoker      Smokeless tobacco: Never Used    Alcohol use: No      Alcohol/week: 0.0 standard drinks       Fam Hx  Family History   Problem Relation Age of Onset   • Other (Other) Mother         RA   • Other (Other) Sister         RA   • Other (mitral Degenerative changes in the spine and shoulders. CONCLUSION:  Minimal atelectasis/scarring in the lower lungs with developing infiltrates not excluded. Clinical correlation recommended.      Dictated by (CST): Luis A Keith MD on 1/08/2022 at

## 2022-01-08 NOTE — CONSULTS
INFECTIOUS DISEASE CONSULTATION    Brenton Bruno Patient Status:  Emergency    1940 MRN [id number]   Location [address] Attending Adilene Rivera MD   Hosp Day # 0 PCP Arline • Other (mitral valve prolapse) Sister    • Other (Psoriasis) Brother       reports that she has never smoked. She has never used smokeless tobacco. She reports that she does not drink alcohol and does not use drugs.       Allergies:    Naproxen Oral Tab, Take 1 tablet (50 mg total) by mouth daily. , Disp: 30 tablet, Rfl: 3  gabapentin 300 MG Oral Cap, Take 1 capsule (300 mg total) by mouth 2 (two) times a day.  At 7pm & 9pm., Disp: 180 capsule, Rfl: 3  methylPREDNISolone 4 MG Oral Tablet Therapy Pa 2  Glucose Blood (ACCU-CHEK SMARTVIEW) In Vitro Strip, USE TO TEST THREE TIMES DAILY OR AS DIRECTED, Disp: 300 strip, Rfl: 2  ACCU-CHEK FASTCLIX LANCETS Does not apply Misc, TEST THREE TIMES DAILY OR AS DIRECTED, Disp: 306 each, Rfl: 1  Blood Glucose Monit Creatinine Kinase  No results for input(s): CK in the last 168 hours. Inflammatory Markers  No results for input(s): CRP, JOSE, LDH, DDIMER in the last 168 hours.       Lab Results   Component Value Date    INR 1.05 01/08/2022    PTP 13.7 01/08/2022 Remdesivir if develops progressive symptoms        Benoit Nation MD, MD  [facility]  [phone]

## 2022-01-08 NOTE — ED PROVIDER NOTES
Patient Seen in: [facility] Emergency Department      History   Patient presents with:  Difficulty Breathing    Stated Complaint: pola    Subjective:   HPI    17-year-old female comes to the ED for evaluation of shortness of breath.   She has a Avenue Right Camera No             Review of Systems    Positive for stated complaint: pola  Other systems are as noted in HPI. Constitutional and vital signs reviewed. All other systems reviewed and negative except as noted above.     Physical Exam     ED Triage Vitals [ Abnormal; Notable for the following components:    Lactic Acid 2.6 (*)     All other components within normal limits   RAPID SARS-COV-2 BY PCR - Abnormal; Notable for the following components:    Rapid SARS-CoV-2 by PCR Detected (*)     All other component interpreted by me. The cardiac monitor was ordered to monitor the patient for dysrhythmia  -Pulse oximetry was interpreted by me and was LOW 88% ON RA . Pulse oximeter was ordered to monitor patient for hypoxia.     Admission disposition: 1/8/2022  5:32 PM pola         PATIENT STATED HISTORY: (As transcribed by Technologist)  Patient offered     no additional history at this time. FINDINGS:  Cardiomegaly with normal pulmonary vascularity.   Minimal     atelectasis/scarring in the lower lungs wit

## 2022-01-09 LAB
ANION GAP SERPL CALC-SCNC: 7 MMOL/L (ref 0–18)
BASOPHILS # BLD AUTO: 0.01 X10(3) UL (ref 0–0.2)
BASOPHILS NFR BLD AUTO: 0.2 %
BUN BLD-MCNC: 12 MG/DL (ref 7–18)
CALCIUM BLD-MCNC: 8.5 MG/DL (ref 8.5–10.1)
CHLORIDE SERPL-SCNC: 108 MMOL/L (ref 98–112)
CO2 SERPL-SCNC: 26 MMOL/L (ref 21–32)
CREAT BLD-MCNC: 0.64 MG/DL
CRP SERPL-MCNC: 0.52 MG/DL (ref ?–0.3)
D DIMER PPP FEU-MCNC: 0.6 UG/ML FEU (ref ?–0.83)
DEPRECATED HBV CORE AB SER IA-ACNC: 66.1 NG/ML
EOSINOPHIL # BLD AUTO: 0.03 X10(3) UL (ref 0–0.7)
EOSINOPHIL NFR BLD AUTO: 0.7 %
ERYTHROCYTE [DISTWIDTH] IN BLOOD BY AUTOMATED COUNT: 15.2 %
GLUCOSE BLD-MCNC: 115 MG/DL (ref 70–99)
GLUCOSE BLD-MCNC: 144 MG/DL (ref 70–99)
GLUCOSE BLD-MCNC: 147 MG/DL (ref 70–99)
GLUCOSE BLD-MCNC: 219 MG/DL (ref 70–99)
GLUCOSE BLD-MCNC: 228 MG/DL (ref 70–99)
HCT VFR BLD AUTO: 37 %
HGB BLD-MCNC: 11.3 G/DL
IMM GRANULOCYTES # BLD AUTO: 0.02 X10(3) UL (ref 0–1)
IMM GRANULOCYTES NFR BLD: 0.5 %
LDH SERPL L TO P-CCNC: 221 U/L
LYMPHOCYTES # BLD AUTO: 1.32 X10(3) UL (ref 1–4)
LYMPHOCYTES NFR BLD AUTO: 30.4 %
MCH RBC QN AUTO: 29.5 PG (ref 26–34)
MCHC RBC AUTO-ENTMCNC: 30.5 G/DL (ref 31–37)
MCV RBC AUTO: 96.6 FL
MONOCYTES # BLD AUTO: 0.79 X10(3) UL (ref 0.1–1)
MONOCYTES NFR BLD AUTO: 18.2 %
NEUTROPHILS # BLD AUTO: 2.17 X10 (3) UL (ref 1.5–7.7)
NEUTROPHILS # BLD AUTO: 2.17 X10(3) UL (ref 1.5–7.7)
NEUTROPHILS NFR BLD AUTO: 50 %
OSMOLALITY SERPL CALC.SUM OF ELEC: 294 MOSM/KG (ref 275–295)
PLATELET # BLD AUTO: 143 10(3)UL (ref 150–450)
POTASSIUM SERPL-SCNC: 3.4 MMOL/L (ref 3.5–5.1)
RBC # BLD AUTO: 3.83 X10(6)UL
SODIUM SERPL-SCNC: 141 MMOL/L (ref 136–145)
WBC # BLD AUTO: 4.3 X10(3) UL (ref 4–11)

## 2022-01-09 PROCEDURE — 85025 COMPLETE CBC W/AUTO DIFF WBC: CPT | Performed by: HOSPITALIST

## 2022-01-09 PROCEDURE — 82728 ASSAY OF FERRITIN: CPT | Performed by: HOSPITALIST

## 2022-01-09 PROCEDURE — XW033E5 INTRODUCTION OF REMDESIVIR ANTI-INFECTIVE INTO PERIPHERAL VEIN, PERCUTANEOUS APPROACH, NEW TECHNOLOGY GROUP 5: ICD-10-PCS | Performed by: INTERNAL MEDICINE

## 2022-01-09 PROCEDURE — 97161 PT EVAL LOW COMPLEX 20 MIN: CPT

## 2022-01-09 PROCEDURE — 83615 LACTATE (LD) (LDH) ENZYME: CPT | Performed by: HOSPITALIST

## 2022-01-09 PROCEDURE — 86140 C-REACTIVE PROTEIN: CPT | Performed by: HOSPITALIST

## 2022-01-09 PROCEDURE — 85379 FIBRIN DEGRADATION QUANT: CPT | Performed by: HOSPITALIST

## 2022-01-09 PROCEDURE — 97530 THERAPEUTIC ACTIVITIES: CPT

## 2022-01-09 PROCEDURE — 82962 GLUCOSE BLOOD TEST: CPT

## 2022-01-09 PROCEDURE — 80048 BASIC METABOLIC PNL TOTAL CA: CPT | Performed by: HOSPITALIST

## 2022-01-09 RX ORDER — POTASSIUM CHLORIDE 20 MEQ/1
40 TABLET, EXTENDED RELEASE ORAL ONCE
Status: COMPLETED | OUTPATIENT
Start: 2022-01-09 | End: 2022-01-09

## 2022-01-09 NOTE — PROGRESS NOTES
[facility] Hospitalist Progress Note     Cat Alvarez Patient Status:  Inpatient    1940 MRN [id number]   [facility] 4NW-A Attending Caroline Fernandez MD   Hosp Day # 1 PCP Axel Go MD     CC: follow up    [facility] 31 metoprolol succinate  100 mg Oral 2x Daily(Beta Blocker)   • pantoprazole  20 mg Oral QAM AC   • sertraline  50 mg Oral Daily   • atorvastatin  10 mg Oral Nightly   • Insulin Aspart Pen  1-5 Units Subcutaneous TID CC and HS   • enoxaparin  40 mg Subcutaneo Magy

## 2022-01-09 NOTE — PROGRESS NOTES
[facility]                INFECTIOUS DISEASE PROGRESS NOTE    Cortez Long Patient Status:  Inpatient    1940 MRN [id number]   [facility] 4NW-A Attending Rodolfo Perkins MD   Hosp Day # 1 PCP Antonio Mcgee MD     Antibi 01/08/22.       Radiology        Problem list reviewed:  Patient Active Problem List:     Seronegative rheumatoid arthritis ([facility] [address]     PD (Parkinson's disease) ([facility] [address]     SHANA (obstructive sleep apnea)     Dermatophytosis of nail     Primary osteoarthritis inv

## 2022-01-09 NOTE — PHYSICAL THERAPY NOTE
PHYSICAL THERAPY EVALUATION - INPATIENT     Room Number: 413/413-A  Evaluation Date: 1/9/2022  Type of Evaluation: Initial  Physician Order: PT Eval and Treat    Presenting Problem: SOB cough  Co-Morbidities : RA DM HTN parknsons foot fracture CVA C2 demonstrate supine - sit EOB @ level: supervision with support of bed rail     Goal #2 Patient is able to demonstrate transfers EOB to/from Chair/Wheelchair at assistance level: supervision     Goal #3 Patient is able to ambulate 10 feet with assist device BP: 132/67  BP Location: Left arm  BP Method: Automatic  Patient Position: Sitting    O2 WALK  Oxygen Therapy  SPO2% on Oxygen at Rest: 98  At rest oxygen flow (liters per minute): 2  SPO2% Ambulation on Oxygen: 92  Ambulation oxygen flow (liters p noted and RN aware. Sit to stand with min a for balance with B HHA. Sidestepped to [facility] with min a patient refusing to sit up in chair due to reports of fatigue.   patient educated in role of PT and need to mobilize to facilitate return to [address] [facility] with HHPT at

## 2022-01-09 NOTE — COVID NURSING ASSESSMENT
COVID-19 Daily Discharge Readiness-Nursing    O2 Sat at Rest:    95-98 % on 2L O2   O2 Sat with Exertion: 90-92% on 2L   Temperature max from last 24 hrs: Temp (24hrs), Av.7 °F (37.1 °C), Min:97.9 °F (36.6 °C), Max:99.2 °F (37.3 °C)    Inflammatory Mar

## 2022-01-09 NOTE — ED QUICK NOTES
Orders for admission, patient is aware of plan and ready to go upstairs. Any questions, please call ED MARIANNE price  at extension [phone]. Vaccinated?  yes  Type of COVID test sent: rapid  COVID Suspicion level: High- positive      Titratable drug(s) infusing:

## 2022-01-09 NOTE — PROGRESS NOTES
COVID-19 Daily Discharge Readiness-Nursing    O2 Sat at Rest:     92-96 % on 4 L NC while sleeping (usually on 2 L NC)  O2 Sat with Exertion:   92 % on    2 L NC  Temperature max from last 24 hrs: Temp (24hrs), Av.8 °F (37.1 °C), Min:97.9 °F (36.6 °C),

## 2022-01-10 LAB
D DIMER PPP FEU-MCNC: 0.34 UG/ML FEU (ref ?–0.83)
GLUCOSE BLD-MCNC: 149 MG/DL (ref 70–99)
GLUCOSE BLD-MCNC: 209 MG/DL (ref 70–99)
GLUCOSE BLD-MCNC: 270 MG/DL (ref 70–99)
GLUCOSE BLD-MCNC: 292 MG/DL (ref 70–99)

## 2022-01-10 PROCEDURE — 82962 GLUCOSE BLOOD TEST: CPT

## 2022-01-10 PROCEDURE — 97530 THERAPEUTIC ACTIVITIES: CPT

## 2022-01-10 PROCEDURE — 85379 FIBRIN DEGRADATION QUANT: CPT | Performed by: HOSPITALIST

## 2022-01-10 PROCEDURE — 97110 THERAPEUTIC EXERCISES: CPT

## 2022-01-10 NOTE — PROGRESS NOTES
[facility]                INFECTIOUS DISEASE PROGRESS NOTE    Jose Becker Patient Status:  Inpatient    1940 MRN [id number]   [facility] 4NW-A Attending Anitha Cook MD   Hosp Day # 2 PCP Janeth Chirinos MD     Antibi Range    Blood Culture Result No Growth 1 Day N/A         Radiology        Problem list reviewed:  Patient Active Problem List:     Seronegative rheumatoid arthritis ([facility] [address]     PD (Parkinson's disease) (HCC)     SHANA (obstructive sleep apnea)     Dermatophyt

## 2022-01-10 NOTE — PROGRESS NOTES
COVID-19 Daily Discharge Readiness-Nursing    O2 Sat at Rest:    93-95 % on 2-4 L NC (4 L while sleeping)   O2 Sat with Exertion: SPO2% Ambulation on Oxygen: 92  % on Ambulation oxygen flow (liters per minute): 2  liters   Temperature max from last 24 hrs:

## 2022-01-10 NOTE — CM/SW NOTE
CM noted PT recommendation for home health. Patient lives at [address] [facility]. CM noted that patient has a long hx with [facility] home health. CM started aidin referral for home health.      Debbie Guardado Phone: [phone] accepted patient back

## 2022-01-10 NOTE — COVID NURSING ASSESSMENT
COVID-19 Daily Discharge Readiness-Nursing    O2 Sat at Rest:   94-96  %  On room air  O2 Sat with Exertion: 90-92% on room air  Temperature max from last 24 hrs: Temp (24hrs), Av °F (36.7 °C), Min:96.9 °F (36.1 °C), Max:99.1 °F (37.3 °C)    Inflammato

## 2022-01-10 NOTE — PROGRESS NOTES
[facility] Hospitalist note    PCP: Buster Estrada MD    Chief Complaint:  covid PNA    SUBJECTIVE:  Feels well, getting more energy and appetite.  Breathing okay    OBJECTIVE:  Temp:  [96.9 °F (36.1 °C)-99.1 °F (37.3 °C)] 96.9 °F (36.1 °C)  Pulse:  [62-68] 68 • aspirin  81 mg Oral BID   • carbidopa-levodopa  1 tablet Oral TID   • fluticasone propionate  1 spray Each Nare Daily   • folic acid  1 mg Oral BID   • furosemide  40 mg Oral Daily   • gabapentin  300 mg Oral BID   • losartan  100 mg Oral Daily   • met

## 2022-01-11 VITALS
RESPIRATION RATE: 18 BRPM | WEIGHT: 200.5 LBS | TEMPERATURE: 99 F | SYSTOLIC BLOOD PRESSURE: 135 MMHG | DIASTOLIC BLOOD PRESSURE: 88 MMHG | OXYGEN SATURATION: 95 % | BODY MASS INDEX: 34.23 KG/M2 | HEIGHT: 64 IN | HEART RATE: 60 BPM

## 2022-01-11 LAB
D DIMER PPP FEU-MCNC: 0.32 UG/ML FEU (ref ?–0.83)
GLUCOSE BLD-MCNC: 168 MG/DL (ref 70–99)
GLUCOSE BLD-MCNC: 246 MG/DL (ref 70–99)

## 2022-01-11 PROCEDURE — 85379 FIBRIN DEGRADATION QUANT: CPT | Performed by: HOSPITALIST

## 2022-01-11 PROCEDURE — 82962 GLUCOSE BLOOD TEST: CPT

## 2022-01-11 NOTE — DISCHARGE SUMMARY
[facility] Dis[facility] Summary    Patient ID:  Jesus Glover  [id number]  20 year old  8/2/1940    Admit date: 1/8/2022  Discharge date and time: 1/11/22  Attending Physician: Maury Gates MD  Primary Care Physician: Akshat Slater MD     Admit 300 MG Caps  Commonly known as: NEURONTIN  Take 1 capsule (300 mg total) by mouth 2 (two) times a day.  At 7pm & 9pm.  What changed: when to take this     metFORMIN 500 MG Tabs  Commonly known as: GLUCOPHAGE  What changed:   · when to take this  · Another m TABLET(100 MG) BY MOUTH TWICE DAILY     modafinil 100 MG Tabs  Commonly known as: PROVIGIL  TAKE 1 TABLET BY MOUTH EVERY DAY.      omeprazole 20 MG Cpdr  Commonly known as: PRILOSEC  TAKE 1 CAPSULE(20 MG) BY MOUTH DAILY     oxybutynin 5 MG Tabs  Commonly kn (CST): Sunitha Crowley MD on 1/08/2022 at 3:28 PM     Finalized by (CST): Sunitha Crowley MD on 1/08/2022 at 3:30 PM       Operative Procedures:    Activity: activity as tolerated  Diet: diabetic diet  Wound Care: none needed  Code Status: Full Code  O2: no

## 2022-01-11 NOTE — CM/SW NOTE
CM notified [facility] of patient discharge today. 1525 CM reserved [facility] BLS transport for 615pm (their 1st available) to return to [facility], [facility] done.       &  to remain available and supportive for discharge dani

## 2022-01-11 NOTE — PLAN OF CARE
COVID-19 Daily Discharge Readiness-Nursing    O2 Sat at Rest:     94-96%   O2 Sat with Exertion: SPO2% Ambulation on Oxygen: 92  % on 90-91% on room air   Temperature max from last 24 hrs: Temp (24hrs), Av.5 °F (36.4 °C), Min:96.7 °F (35.9 °C), Max:98.

## 2022-01-11 NOTE — PROGRESS NOTES
[facility]                INFECTIOUS DISEASE PROGRESS NOTE    Mattie Styles Patient Status:  Inpatient    1940 MRN [id number]   [facility] 4NW-A Attending Meena Hutchins MD   Hosp Day # 3 PCP [address] MD     Antibi (Preliminary result)    Collection Time: 01/08/22  3:17 PM    Specimen: Blood,peripheral   Result Value Ref Range    Blood Culture Result No Growth 2 Days N/A         Radiology        Problem list reviewed:  Patient Active Problem List:     Seronegative rh

## 2022-01-12 NOTE — PLAN OF CARE
COVID-19 Daily Discharge Readiness-Nursing    O2 Sat at Rest:   95 RA  %   O2 Sat with Exertion: YEISON as pt uses wheelchair PTA   Temperature max from last 24 hrs: Temp (24hrs), Av.2 °F (36.8 °C), Min:97.4 °F (36.3 °C), Max:98.9 °F (37.2 °C)    Inflamma

## 2022-02-15 ENCOUNTER — APPOINTMENT (OUTPATIENT)
Dept: GENERAL RADIOLOGY | Facility: HOSPITAL | Age: 82
End: 2022-02-15
Attending: EMERGENCY MEDICINE
Payer: MEDICARE

## 2022-02-15 ENCOUNTER — HOSPITAL ENCOUNTER (EMERGENCY)
Facility: HOSPITAL | Age: 82
Discharge: HOME | End: 2022-02-15
Attending: EMERGENCY MEDICINE
Payer: MEDICARE

## 2022-02-15 VITALS
DIASTOLIC BLOOD PRESSURE: 76 MMHG | OXYGEN SATURATION: 94 % | RESPIRATION RATE: 24 BRPM | SYSTOLIC BLOOD PRESSURE: 156 MMHG | HEART RATE: 69 BPM

## 2022-02-15 DIAGNOSIS — S20.229A CONTUSION OF BACK, UNSPECIFIED LATERALITY, INITIAL ENCOUNTER: Primary | ICD-10-CM

## 2022-02-15 PROCEDURE — 72100 X-RAY EXAM L-S SPINE 2/3 VWS: CPT | Performed by: EMERGENCY MEDICINE

## 2022-02-15 PROCEDURE — 99283 EMERGENCY DEPT VISIT LOW MDM: CPT

## 2022-02-15 PROCEDURE — 99284 EMERGENCY DEPT VISIT MOD MDM: CPT

## 2022-02-15 PROCEDURE — 72072 X-RAY EXAM THORAC SPINE 3VWS: CPT | Performed by: EMERGENCY MEDICINE

## 2022-02-15 RX ORDER — ACETAMINOPHEN AND CODEINE PHOSPHATE 300; 30 MG/1; MG/1
2 TABLET ORAL ONCE
Status: COMPLETED | OUTPATIENT
Start: 2022-02-15 | End: 2022-02-15

## 2022-02-15 RX ORDER — ACETAMINOPHEN AND CODEINE PHOSPHATE 300; 30 MG/1; MG/1
1-2 TABLET ORAL EVERY 6 HOURS PRN
Qty: 10 TABLET | Refills: 0 | Status: SHIPPED | OUTPATIENT
Start: 2022-02-15 | End: 2022-02-20

## 2022-02-16 NOTE — ED INITIAL ASSESSMENT (HPI)
PT TO THE ED WITH C/O FALL THAT HAPPENED AT 0430 THIS MORNING. PT STATES SHE WAS TRYING TO GET INTO HER WHEELCHAIR TO GO TO THE BATHROOM WHEN SHE FELL AND HIT HER RIGHT FLANK. PT DENIES LOC OR HITTING HER HEAD.  PT IS NOT ON BLOOD THINNERS

## 2022-03-09 ENCOUNTER — APPOINTMENT (OUTPATIENT)
Dept: LAB | Age: 82
End: 2022-03-09
Attending: INTERNAL MEDICINE
Payer: MEDICARE

## 2022-03-09 PROCEDURE — 81001 URINALYSIS AUTO W/SCOPE: CPT

## 2022-03-09 PROCEDURE — 87086 URINE CULTURE/COLONY COUNT: CPT

## 2022-03-10 ENCOUNTER — NURSE ONLY (OUTPATIENT)
Dept: LAB | Age: 82
End: 2022-03-10
Attending: INTERNAL MEDICINE
Payer: MEDICARE

## 2022-03-10 DIAGNOSIS — M15.9 PRIMARY OSTEOARTHRITIS INVOLVING MULTIPLE JOINTS: ICD-10-CM

## 2022-03-10 DIAGNOSIS — M17.12 PRIMARY OSTEOARTHRITIS OF LEFT KNEE: ICD-10-CM

## 2022-03-10 DIAGNOSIS — I10 ESSENTIAL HYPERTENSION: ICD-10-CM

## 2022-03-10 DIAGNOSIS — R35.0 URINARY FREQUENCY: ICD-10-CM

## 2022-03-10 LAB
ALBUMIN SERPL-MCNC: 2.8 G/DL (ref 3.4–5)
ALBUMIN/GLOB SERPL: 0.8 {RATIO} (ref 1–2)
ALP LIVER SERPL-CCNC: 100 U/L
ALT SERPL-CCNC: 18 U/L
ANION GAP SERPL CALC-SCNC: 6 MMOL/L (ref 0–18)
AST SERPL-CCNC: 19 U/L (ref 15–37)
BASOPHILS # BLD AUTO: 0.05 X10(3) UL (ref 0–0.2)
BASOPHILS NFR BLD AUTO: 0.7 %
BILIRUB SERPL-MCNC: 0.2 MG/DL (ref 0.1–2)
BILIRUB UR QL STRIP.AUTO: NEGATIVE
BUN BLD-MCNC: 18 MG/DL (ref 7–18)
CALCIUM BLD-MCNC: 9 MG/DL (ref 8.5–10.1)
CHLORIDE SERPL-SCNC: 107 MMOL/L (ref 98–112)
CO2 SERPL-SCNC: 26 MMOL/L (ref 21–32)
COLOR UR AUTO: YELLOW
CREAT BLD-MCNC: 0.53 MG/DL
EOSINOPHIL # BLD AUTO: 0.41 X10(3) UL (ref 0–0.7)
EOSINOPHIL NFR BLD AUTO: 5.4 %
ERYTHROCYTE [DISTWIDTH] IN BLOOD BY AUTOMATED COUNT: 15.5 %
FASTING STATUS PATIENT QL REPORTED: YES
GLOBULIN PLAS-MCNC: 3.3 G/DL (ref 2.8–4.4)
GLUCOSE BLD-MCNC: 146 MG/DL (ref 70–99)
GLUCOSE UR STRIP.AUTO-MCNC: NEGATIVE MG/DL
HCT VFR BLD AUTO: 33.3 %
HGB BLD-MCNC: 10.4 G/DL
IMM GRANULOCYTES # BLD AUTO: 0.02 X10(3) UL (ref 0–1)
IMM GRANULOCYTES NFR BLD: 0.3 %
LYMPHOCYTES # BLD AUTO: 3.53 X10(3) UL (ref 1–4)
LYMPHOCYTES NFR BLD AUTO: 46.1 %
MCH RBC QN AUTO: 29.1 PG (ref 26–34)
MCV RBC AUTO: 93.3 FL
MONOCYTES # BLD AUTO: 0.96 X10(3) UL (ref 0.1–1)
MONOCYTES NFR BLD AUTO: 12.5 %
NEUTROPHILS # BLD AUTO: 2.68 X10 (3) UL (ref 1.5–7.7)
NEUTROPHILS # BLD AUTO: 2.68 X10(3) UL (ref 1.5–7.7)
NEUTROPHILS NFR BLD AUTO: 35 %
OSMOLALITY SERPL CALC.SUM OF ELEC: 293 MOSM/KG (ref 275–295)
PH UR STRIP.AUTO: 5 [PH] (ref 5–8)
PLATELET # BLD AUTO: 244 10(3)UL (ref 150–450)
POTASSIUM SERPL-SCNC: 4.3 MMOL/L (ref 3.5–5.1)
PROT SERPL-MCNC: 6.1 G/DL (ref 6.4–8.2)
PROT UR STRIP.AUTO-MCNC: NEGATIVE MG/DL
RBC # BLD AUTO: 3.57 X10(6)UL
RBC UR QL AUTO: NEGATIVE
SODIUM SERPL-SCNC: 139 MMOL/L (ref 136–145)
SP GR UR STRIP.AUTO: 1.02 (ref 1–1.03)
UROBILINOGEN UR STRIP.AUTO-MCNC: <2 MG/DL
WBC # BLD AUTO: 7.7 X10(3) UL (ref 4–11)

## 2022-03-10 PROCEDURE — 80053 COMPREHEN METABOLIC PANEL: CPT

## 2022-03-10 PROCEDURE — 85025 COMPLETE CBC W/AUTO DIFF WBC: CPT

## 2022-03-10 PROCEDURE — 36415 COLL VENOUS BLD VENIPUNCTURE: CPT

## 2022-05-17 ENCOUNTER — NURSE ONLY (OUTPATIENT)
Dept: LAB | Age: 82
End: 2022-05-17
Attending: INTERNAL MEDICINE
Payer: MEDICARE

## 2022-05-17 DIAGNOSIS — I10 ESSENTIAL HYPERTENSION: ICD-10-CM

## 2022-05-17 LAB
ALBUMIN SERPL-MCNC: 3 G/DL (ref 3.4–5)
ALBUMIN/GLOB SERPL: 0.8 {RATIO} (ref 1–2)
ALP LIVER SERPL-CCNC: 74 U/L
ALT SERPL-CCNC: 16 U/L
ANION GAP SERPL CALC-SCNC: 4 MMOL/L (ref 0–18)
AST SERPL-CCNC: 21 U/L (ref 15–37)
BASOPHILS # BLD AUTO: 0.07 X10(3) UL (ref 0–0.2)
BASOPHILS NFR BLD AUTO: 0.9 %
BILIRUB SERPL-MCNC: 0.4 MG/DL (ref 0.1–2)
BUN BLD-MCNC: 15 MG/DL (ref 7–18)
CALCIUM BLD-MCNC: 9.5 MG/DL (ref 8.5–10.1)
CHLORIDE SERPL-SCNC: 106 MMOL/L (ref 98–112)
CO2 SERPL-SCNC: 30 MMOL/L (ref 21–32)
CREAT BLD-MCNC: 0.62 MG/DL
EOSINOPHIL # BLD AUTO: 0.34 X10(3) UL (ref 0–0.7)
EOSINOPHIL NFR BLD AUTO: 4.2 %
ERYTHROCYTE [DISTWIDTH] IN BLOOD BY AUTOMATED COUNT: 16 %
FASTING STATUS PATIENT QL REPORTED: YES
GLOBULIN PLAS-MCNC: 3.8 G/DL (ref 2.8–4.4)
GLUCOSE BLD-MCNC: 124 MG/DL (ref 70–99)
HCT VFR BLD AUTO: 33.9 %
HGB BLD-MCNC: 10.5 G/DL
IMM GRANULOCYTES # BLD AUTO: 0.02 X10(3) UL (ref 0–1)
IMM GRANULOCYTES NFR BLD: 0.2 %
LYMPHOCYTES # BLD AUTO: 3.3 X10(3) UL (ref 1–4)
LYMPHOCYTES NFR BLD AUTO: 40.8 %
MCH RBC QN AUTO: 28.5 PG (ref 26–34)
MCHC RBC AUTO-ENTMCNC: 31 G/DL (ref 31–37)
MCV RBC AUTO: 91.9 FL
MONOCYTES # BLD AUTO: 1.02 X10(3) UL (ref 0.1–1)
MONOCYTES NFR BLD AUTO: 12.6 %
NEUTROPHILS # BLD AUTO: 3.34 X10 (3) UL (ref 1.5–7.7)
NEUTROPHILS # BLD AUTO: 3.34 X10(3) UL (ref 1.5–7.7)
NEUTROPHILS NFR BLD AUTO: 41.3 %
OSMOLALITY SERPL CALC.SUM OF ELEC: 292 MOSM/KG (ref 275–295)
PLATELET # BLD AUTO: 242 10(3)UL (ref 150–450)
POTASSIUM SERPL-SCNC: 3.6 MMOL/L (ref 3.5–5.1)
PROT SERPL-MCNC: 6.8 G/DL (ref 6.4–8.2)
RBC # BLD AUTO: 3.69 X10(6)UL
SODIUM SERPL-SCNC: 140 MMOL/L (ref 136–145)
WBC # BLD AUTO: 8.1 X10(3) UL (ref 4–11)

## 2022-05-17 PROCEDURE — 36415 COLL VENOUS BLD VENIPUNCTURE: CPT

## 2022-05-17 PROCEDURE — 85025 COMPLETE CBC W/AUTO DIFF WBC: CPT

## 2022-05-17 PROCEDURE — 80053 COMPREHEN METABOLIC PANEL: CPT

## 2022-05-31 ENCOUNTER — NURSE ONLY (OUTPATIENT)
Dept: LAB | Age: 82
End: 2022-05-31
Attending: INTERNAL MEDICINE
Payer: MEDICARE

## 2022-05-31 DIAGNOSIS — J12.82 PNEUMONIA DUE TO COVID-19 VIRUS: ICD-10-CM

## 2022-05-31 DIAGNOSIS — U07.1 PNEUMONIA DUE TO COVID-19 VIRUS: ICD-10-CM

## 2022-05-31 DIAGNOSIS — M15.9 PRIMARY OSTEOARTHRITIS INVOLVING MULTIPLE JOINTS: ICD-10-CM

## 2022-05-31 DIAGNOSIS — I10 ESSENTIAL HYPERTENSION: ICD-10-CM

## 2022-05-31 DIAGNOSIS — M06.00 SERONEGATIVE RHEUMATOID ARTHRITIS (HCC): ICD-10-CM

## 2022-05-31 LAB
ALBUMIN SERPL-MCNC: 2.9 G/DL (ref 3.4–5)
ALBUMIN/GLOB SERPL: 0.7 {RATIO} (ref 1–2)
ALP LIVER SERPL-CCNC: 77 U/L
ALT SERPL-CCNC: 20 U/L
ANION GAP SERPL CALC-SCNC: 6 MMOL/L (ref 0–18)
AST SERPL-CCNC: 27 U/L (ref 15–37)
BASOPHILS # BLD AUTO: 0.04 X10(3) UL (ref 0–0.2)
BASOPHILS NFR BLD AUTO: 0.5 %
BILIRUB SERPL-MCNC: 0.4 MG/DL (ref 0.1–2)
BUN BLD-MCNC: 12 MG/DL (ref 7–18)
CALCIUM BLD-MCNC: 9.1 MG/DL (ref 8.5–10.1)
CHLORIDE SERPL-SCNC: 107 MMOL/L (ref 98–112)
CO2 SERPL-SCNC: 28 MMOL/L (ref 21–32)
CREAT BLD-MCNC: 0.56 MG/DL
EOSINOPHIL # BLD AUTO: 0.23 X10(3) UL (ref 0–0.7)
EOSINOPHIL NFR BLD AUTO: 3.1 %
ERYTHROCYTE [DISTWIDTH] IN BLOOD BY AUTOMATED COUNT: 16.5 %
FASTING STATUS PATIENT QL REPORTED: YES
GLOBULIN PLAS-MCNC: 3.9 G/DL (ref 2.8–4.4)
GLUCOSE BLD-MCNC: 131 MG/DL (ref 70–99)
HCT VFR BLD AUTO: 33 %
HGB BLD-MCNC: 10.2 G/DL
IMM GRANULOCYTES # BLD AUTO: 0.02 X10(3) UL (ref 0–1)
IMM GRANULOCYTES NFR BLD: 0.3 %
LYMPHOCYTES # BLD AUTO: 3.11 X10(3) UL (ref 1–4)
LYMPHOCYTES NFR BLD AUTO: 41.5 %
MCH RBC QN AUTO: 28.3 PG (ref 26–34)
MCHC RBC AUTO-ENTMCNC: 30.9 G/DL (ref 31–37)
MCV RBC AUTO: 91.7 FL
MONOCYTES # BLD AUTO: 0.82 X10(3) UL (ref 0.1–1)
MONOCYTES NFR BLD AUTO: 10.9 %
NEUTROPHILS # BLD AUTO: 3.28 X10 (3) UL (ref 1.5–7.7)
NEUTROPHILS # BLD AUTO: 3.28 X10(3) UL (ref 1.5–7.7)
NEUTROPHILS NFR BLD AUTO: 43.7 %
OSMOLALITY SERPL CALC.SUM OF ELEC: 294 MOSM/KG (ref 275–295)
PLATELET # BLD AUTO: 291 10(3)UL (ref 150–450)
POTASSIUM SERPL-SCNC: 3.7 MMOL/L (ref 3.5–5.1)
PROT SERPL-MCNC: 6.8 G/DL (ref 6.4–8.2)
RBC # BLD AUTO: 3.6 X10(6)UL
SODIUM SERPL-SCNC: 141 MMOL/L (ref 136–145)
WBC # BLD AUTO: 7.5 X10(3) UL (ref 4–11)

## 2022-05-31 PROCEDURE — 80053 COMPREHEN METABOLIC PANEL: CPT

## 2022-05-31 PROCEDURE — 36415 COLL VENOUS BLD VENIPUNCTURE: CPT

## 2022-05-31 PROCEDURE — 85025 COMPLETE CBC W/AUTO DIFF WBC: CPT

## 2022-07-05 ENCOUNTER — NURSE ONLY (OUTPATIENT)
Dept: LAB | Age: 82
End: 2022-07-05
Attending: INTERNAL MEDICINE
Payer: MEDICARE

## 2022-07-05 DIAGNOSIS — M06.00 SERONEGATIVE RHEUMATOID ARTHRITIS (HCC): ICD-10-CM

## 2022-07-05 DIAGNOSIS — R09.02 HYPOXIA: ICD-10-CM

## 2022-07-05 DIAGNOSIS — S92.301A CLOSED FRACTURE OF METATARSAL NECK, RIGHT, INITIAL ENCOUNTER: ICD-10-CM

## 2022-07-05 DIAGNOSIS — I10 ESSENTIAL HYPERTENSION: ICD-10-CM

## 2022-07-05 DIAGNOSIS — M17.12 PRIMARY OSTEOARTHRITIS OF LEFT KNEE: ICD-10-CM

## 2022-07-05 DIAGNOSIS — Z51.81 THERAPEUTIC DRUG MONITORING: ICD-10-CM

## 2022-07-05 DIAGNOSIS — M15.9 PRIMARY OSTEOARTHRITIS INVOLVING MULTIPLE JOINTS: ICD-10-CM

## 2022-07-05 LAB
VIT B12 SERPL-MCNC: 413 PG/ML (ref 193–986)
VIT D+METAB SERPL-MCNC: 54 NG/ML (ref 30–100)

## 2022-07-05 PROCEDURE — 82607 VITAMIN B-12: CPT

## 2022-07-05 PROCEDURE — 82306 VITAMIN D 25 HYDROXY: CPT

## 2022-07-05 PROCEDURE — 36415 COLL VENOUS BLD VENIPUNCTURE: CPT

## 2022-09-06 ENCOUNTER — LAB REQUISITION (OUTPATIENT)
Dept: LAB | Facility: HOSPITAL | Age: 82
End: 2022-09-06
Payer: MEDICARE

## 2022-09-06 DIAGNOSIS — I10 ESSENTIAL (PRIMARY) HYPERTENSION: ICD-10-CM

## 2022-09-06 DIAGNOSIS — I27.20 PULMONARY HYPERTENSION, UNSPECIFIED (HCC): ICD-10-CM

## 2022-09-06 LAB
ALBUMIN SERPL-MCNC: 3.2 G/DL (ref 3.4–5)
ALBUMIN/GLOB SERPL: 0.9 {RATIO} (ref 1–2)
ALP LIVER SERPL-CCNC: 97 U/L
ALT SERPL-CCNC: 25 U/L
ANION GAP SERPL CALC-SCNC: 6 MMOL/L (ref 0–18)
AST SERPL-CCNC: 20 U/L (ref 15–37)
BASOPHILS # BLD AUTO: 0.06 X10(3) UL (ref 0–0.2)
BASOPHILS NFR BLD AUTO: 0.8 %
BILIRUB SERPL-MCNC: 0.3 MG/DL (ref 0.1–2)
BUN BLD-MCNC: 17 MG/DL (ref 7–18)
CALCIUM BLD-MCNC: 9 MG/DL (ref 8.5–10.1)
CHLORIDE SERPL-SCNC: 107 MMOL/L (ref 98–112)
CO2 SERPL-SCNC: 27 MMOL/L (ref 21–32)
CREAT BLD-MCNC: 0.66 MG/DL
EOSINOPHIL # BLD AUTO: 0.28 X10(3) UL (ref 0–0.7)
EOSINOPHIL NFR BLD AUTO: 3.7 %
ERYTHROCYTE [DISTWIDTH] IN BLOOD BY AUTOMATED COUNT: 18.1 %
GFR SERPLBLD BASED ON 1.73 SQ M-ARVRAT: 86 ML/MIN/1.73M2 (ref 60–?)
GLOBULIN PLAS-MCNC: 3.7 G/DL (ref 2.8–4.4)
GLUCOSE BLD-MCNC: 124 MG/DL (ref 70–99)
HCT VFR BLD AUTO: 35.4 %
HGB BLD-MCNC: 10.7 G/DL
IMM GRANULOCYTES # BLD AUTO: 0.01 X10(3) UL (ref 0–1)
IMM GRANULOCYTES NFR BLD: 0.1 %
LYMPHOCYTES # BLD AUTO: 3.36 X10(3) UL (ref 1–4)
LYMPHOCYTES NFR BLD AUTO: 43.9 %
MCH RBC QN AUTO: 27.1 PG (ref 26–34)
MCHC RBC AUTO-ENTMCNC: 30.2 G/DL (ref 31–37)
MCV RBC AUTO: 89.6 FL
MONOCYTES # BLD AUTO: 0.96 X10(3) UL (ref 0.1–1)
MONOCYTES NFR BLD AUTO: 12.5 %
NEUTROPHILS # BLD AUTO: 2.99 X10 (3) UL (ref 1.5–7.7)
NEUTROPHILS # BLD AUTO: 2.99 X10(3) UL (ref 1.5–7.7)
NEUTROPHILS NFR BLD AUTO: 39 %
OSMOLALITY SERPL CALC.SUM OF ELEC: 293 MOSM/KG (ref 275–295)
PLATELET # BLD AUTO: 228 10(3)UL (ref 150–450)
POTASSIUM SERPL-SCNC: 3.8 MMOL/L (ref 3.5–5.1)
PROT SERPL-MCNC: 6.9 G/DL (ref 6.4–8.2)
RBC # BLD AUTO: 3.95 X10(6)UL
SODIUM SERPL-SCNC: 140 MMOL/L (ref 136–145)
WBC # BLD AUTO: 7.7 X10(3) UL (ref 4–11)

## 2022-09-06 PROCEDURE — 80053 COMPREHEN METABOLIC PANEL: CPT | Performed by: INTERNAL MEDICINE

## 2022-09-06 PROCEDURE — 85025 COMPLETE CBC W/AUTO DIFF WBC: CPT | Performed by: INTERNAL MEDICINE

## 2022-11-17 ENCOUNTER — LAB REQUISITION (OUTPATIENT)
Dept: LAB | Facility: HOSPITAL | Age: 82
End: 2022-11-17
Payer: MEDICARE

## 2022-11-17 DIAGNOSIS — E11.9 TYPE 2 DIABETES MELLITUS WITHOUT COMPLICATIONS (HCC): ICD-10-CM

## 2022-11-17 LAB
ALBUMIN SERPL-MCNC: 3 G/DL (ref 3.4–5)
ALBUMIN/GLOB SERPL: 0.7 {RATIO} (ref 1–2)
ALP LIVER SERPL-CCNC: 79 U/L
ALT SERPL-CCNC: 25 U/L
ANION GAP SERPL CALC-SCNC: 4 MMOL/L (ref 0–18)
AST SERPL-CCNC: 20 U/L (ref 15–37)
BASOPHILS # BLD AUTO: 0.03 X10(3) UL (ref 0–0.2)
BASOPHILS NFR BLD AUTO: 0.4 %
BILIRUB SERPL-MCNC: 0.3 MG/DL (ref 0.1–2)
BUN BLD-MCNC: 15 MG/DL (ref 7–18)
CALCIUM BLD-MCNC: 9.6 MG/DL (ref 8.5–10.1)
CHLORIDE SERPL-SCNC: 103 MMOL/L (ref 98–112)
CO2 SERPL-SCNC: 30 MMOL/L (ref 21–32)
CREAT BLD-MCNC: 0.77 MG/DL
EOSINOPHIL # BLD AUTO: 0.3 X10(3) UL (ref 0–0.7)
EOSINOPHIL NFR BLD AUTO: 4.2 %
ERYTHROCYTE [DISTWIDTH] IN BLOOD BY AUTOMATED COUNT: 15.2 %
EST. AVERAGE GLUCOSE BLD GHB EST-MCNC: 192 MG/DL (ref 68–126)
GFR SERPLBLD BASED ON 1.73 SQ M-ARVRAT: 76 ML/MIN/1.73M2 (ref 60–?)
GLOBULIN PLAS-MCNC: 4.3 G/DL (ref 2.8–4.4)
GLUCOSE BLD-MCNC: 129 MG/DL (ref 70–99)
HBA1C MFR BLD: 8.3 % (ref ?–5.7)
HCT VFR BLD AUTO: 33.6 %
HGB BLD-MCNC: 10.4 G/DL
IMM GRANULOCYTES # BLD AUTO: 0.02 X10(3) UL (ref 0–1)
IMM GRANULOCYTES NFR BLD: 0.3 %
LYMPHOCYTES # BLD AUTO: 1.76 X10(3) UL (ref 1–4)
LYMPHOCYTES NFR BLD AUTO: 24.6 %
MCH RBC QN AUTO: 26.9 PG (ref 26–34)
MCHC RBC AUTO-ENTMCNC: 31 G/DL (ref 31–37)
MCV RBC AUTO: 87 FL
MONOCYTES # BLD AUTO: 1.07 X10(3) UL (ref 0.1–1)
MONOCYTES NFR BLD AUTO: 14.9 %
NEUTROPHILS # BLD AUTO: 3.98 X10 (3) UL (ref 1.5–7.7)
NEUTROPHILS # BLD AUTO: 3.98 X10(3) UL (ref 1.5–7.7)
NEUTROPHILS NFR BLD AUTO: 55.6 %
OSMOLALITY SERPL CALC.SUM OF ELEC: 287 MOSM/KG (ref 275–295)
PLATELET # BLD AUTO: 230 10(3)UL (ref 150–450)
POTASSIUM SERPL-SCNC: 4 MMOL/L (ref 3.5–5.1)
PROT SERPL-MCNC: 7.3 G/DL (ref 6.4–8.2)
RBC # BLD AUTO: 3.86 X10(6)UL
SODIUM SERPL-SCNC: 137 MMOL/L (ref 136–145)
WBC # BLD AUTO: 7.2 X10(3) UL (ref 4–11)

## 2022-11-17 PROCEDURE — 85025 COMPLETE CBC W/AUTO DIFF WBC: CPT | Performed by: INTERNAL MEDICINE

## 2022-11-17 PROCEDURE — 83036 HEMOGLOBIN GLYCOSYLATED A1C: CPT | Performed by: INTERNAL MEDICINE

## 2022-11-17 PROCEDURE — 80053 COMPREHEN METABOLIC PANEL: CPT | Performed by: INTERNAL MEDICINE

## 2022-11-25 ENCOUNTER — LAB ENCOUNTER (OUTPATIENT)
Dept: LAB | Age: 82
End: 2022-11-25
Attending: INTERNAL MEDICINE
Payer: MEDICARE

## 2023-01-16 PROCEDURE — 87086 URINE CULTURE/COLONY COUNT: CPT

## 2023-01-16 PROCEDURE — 81003 URINALYSIS AUTO W/O SCOPE: CPT

## 2023-01-17 ENCOUNTER — LAB REQUISITION (OUTPATIENT)
Dept: LAB | Facility: HOSPITAL | Age: 83
End: 2023-01-17
Payer: MEDICARE

## 2023-01-17 DIAGNOSIS — E11.21 TYPE 2 DIABETES MELLITUS WITH DIABETIC NEPHROPATHY (HCC): ICD-10-CM

## 2023-01-17 DIAGNOSIS — R35.0 FREQUENCY OF MICTURITION: ICD-10-CM

## 2023-01-17 LAB
BILIRUB UR QL STRIP.AUTO: NEGATIVE
CLARITY UR REFRACT.AUTO: CLEAR
GLUCOSE UR STRIP.AUTO-MCNC: >=500 MG/DL
KETONES UR STRIP.AUTO-MCNC: NEGATIVE MG/DL
LEUKOCYTE ESTERASE UR QL STRIP.AUTO: NEGATIVE
NITRITE UR QL STRIP.AUTO: NEGATIVE
PH UR STRIP.AUTO: 7 [PH] (ref 5–8)
PROT UR STRIP.AUTO-MCNC: NEGATIVE MG/DL
RBC UR QL AUTO: NEGATIVE
SP GR UR STRIP.AUTO: 1.01 (ref 1–1.03)
UROBILINOGEN UR STRIP.AUTO-MCNC: <2 MG/DL

## 2023-01-21 ENCOUNTER — HOSPITAL ENCOUNTER (EMERGENCY)
Facility: HOSPITAL | Age: 83
Discharge: HOME | End: 2023-01-21
Attending: EMERGENCY MEDICINE
Payer: MEDICARE

## 2023-01-21 VITALS
RESPIRATION RATE: 16 BRPM | WEIGHT: 186 LBS | DIASTOLIC BLOOD PRESSURE: 65 MMHG | SYSTOLIC BLOOD PRESSURE: 139 MMHG | BODY MASS INDEX: 32.96 KG/M2 | HEART RATE: 76 BPM | TEMPERATURE: 98 F | HEIGHT: 63 IN | OXYGEN SATURATION: 97 %

## 2023-01-21 DIAGNOSIS — M25.50 ARTHRALGIA, UNSPECIFIED JOINT: Primary | ICD-10-CM

## 2023-01-21 LAB
ALBUMIN SERPL-MCNC: 2.4 G/DL (ref 3.4–5)
ALBUMIN/GLOB SERPL: 0.4 {RATIO} (ref 1–2)
ALP LIVER SERPL-CCNC: 89 U/L
ALT SERPL-CCNC: 6 U/L
ANION GAP SERPL CALC-SCNC: 7 MMOL/L (ref 0–18)
AST SERPL-CCNC: 12 U/L (ref 15–37)
BASOPHILS # BLD AUTO: 0.04 X10(3) UL (ref 0–0.2)
BASOPHILS NFR BLD AUTO: 0.4 %
BILIRUB SERPL-MCNC: 0.4 MG/DL (ref 0.1–2)
BILIRUB UR QL STRIP.AUTO: NEGATIVE
BUN BLD-MCNC: 19 MG/DL (ref 7–18)
CALCIUM BLD-MCNC: 9.8 MG/DL (ref 8.5–10.1)
CHLORIDE SERPL-SCNC: 100 MMOL/L (ref 98–112)
CLARITY UR REFRACT.AUTO: CLEAR
CO2 SERPL-SCNC: 27 MMOL/L (ref 21–32)
COLOR UR AUTO: YELLOW
CREAT BLD-MCNC: 0.82 MG/DL
EOSINOPHIL # BLD AUTO: 0.06 X10(3) UL (ref 0–0.7)
EOSINOPHIL NFR BLD AUTO: 0.6 %
ERYTHROCYTE [DISTWIDTH] IN BLOOD BY AUTOMATED COUNT: 16.7 %
GFR SERPLBLD BASED ON 1.73 SQ M-ARVRAT: 70 ML/MIN/1.73M2 (ref 60–?)
GLOBULIN PLAS-MCNC: 5.5 G/DL (ref 2.8–4.4)
GLUCOSE BLD-MCNC: 206 MG/DL (ref 70–99)
GLUCOSE BLD-MCNC: 243 MG/DL (ref 70–99)
GLUCOSE BLD-MCNC: 316 MG/DL (ref 70–99)
GLUCOSE UR STRIP.AUTO-MCNC: 150 MG/DL
HCT VFR BLD AUTO: 32.2 %
HGB BLD-MCNC: 10.1 G/DL
IMM GRANULOCYTES # BLD AUTO: 0.06 X10(3) UL (ref 0–1)
IMM GRANULOCYTES NFR BLD: 0.6 %
KETONES UR STRIP.AUTO-MCNC: NEGATIVE MG/DL
LEUKOCYTE ESTERASE UR QL STRIP.AUTO: NEGATIVE
LYMPHOCYTES # BLD AUTO: 1.36 X10(3) UL (ref 1–4)
LYMPHOCYTES NFR BLD AUTO: 12.8 %
MCH RBC QN AUTO: 25.2 PG (ref 26–34)
MCHC RBC AUTO-ENTMCNC: 31.4 G/DL (ref 31–37)
MCV RBC AUTO: 80.3 FL
MONOCYTES # BLD AUTO: 1.39 X10(3) UL (ref 0.1–1)
MONOCYTES NFR BLD AUTO: 13.1 %
NEUTROPHILS # BLD AUTO: 7.71 X10 (3) UL (ref 1.5–7.7)
NEUTROPHILS # BLD AUTO: 7.71 X10(3) UL (ref 1.5–7.7)
NEUTROPHILS NFR BLD AUTO: 72.5 %
NITRITE UR QL STRIP.AUTO: NEGATIVE
OSMOLALITY SERPL CALC.SUM OF ELEC: 292 MOSM/KG (ref 275–295)
PH UR STRIP.AUTO: 5 [PH] (ref 5–8)
PLATELET # BLD AUTO: 406 10(3)UL (ref 150–450)
POTASSIUM SERPL-SCNC: 4.1 MMOL/L (ref 3.5–5.1)
PROT SERPL-MCNC: 7.9 G/DL (ref 6.4–8.2)
PROT UR STRIP.AUTO-MCNC: NEGATIVE MG/DL
RBC # BLD AUTO: 4.01 X10(6)UL
RBC UR QL AUTO: NEGATIVE
SODIUM SERPL-SCNC: 134 MMOL/L (ref 136–145)
SP GR UR STRIP.AUTO: 1.01 (ref 1–1.03)
UROBILINOGEN UR STRIP.AUTO-MCNC: <2 MG/DL
WBC # BLD AUTO: 10.6 X10(3) UL (ref 4–11)

## 2023-01-21 PROCEDURE — 81003 URINALYSIS AUTO W/O SCOPE: CPT | Performed by: EMERGENCY MEDICINE

## 2023-01-21 PROCEDURE — 85025 COMPLETE CBC W/AUTO DIFF WBC: CPT | Performed by: EMERGENCY MEDICINE

## 2023-01-21 PROCEDURE — 99284 EMERGENCY DEPT VISIT MOD MDM: CPT

## 2023-01-21 PROCEDURE — 96374 THER/PROPH/DIAG INJ IV PUSH: CPT

## 2023-01-21 PROCEDURE — 80053 COMPREHEN METABOLIC PANEL: CPT | Performed by: EMERGENCY MEDICINE

## 2023-01-21 PROCEDURE — 82962 GLUCOSE BLOOD TEST: CPT

## 2023-01-21 RX ORDER — MELATONIN
50 DAILY
COMMUNITY

## 2023-01-21 RX ORDER — KETOROLAC TROMETHAMINE 15 MG/ML
15 INJECTION, SOLUTION INTRAMUSCULAR; INTRAVENOUS ONCE
Status: COMPLETED | OUTPATIENT
Start: 2023-01-21 | End: 2023-01-21

## 2023-01-21 RX ORDER — GLIMEPIRIDE 2 MG/1
2 TABLET ORAL
COMMUNITY

## 2023-01-21 RX ORDER — ACETAMINOPHEN 325 MG/1
325 TABLET ORAL EVERY 6 HOURS PRN
COMMUNITY

## 2023-01-21 RX ORDER — INSULIN ASPART 100 [IU]/ML
0.15 INJECTION, SOLUTION INTRAVENOUS; SUBCUTANEOUS ONCE
Status: COMPLETED | OUTPATIENT
Start: 2023-01-21 | End: 2023-01-21

## 2023-01-21 NOTE — ED INITIAL ASSESSMENT (HPI)
Pt presents to the ED via EMS from [address] [place] [address] with c/o pain to right wrist and left knee since Tuesday. Pt denies trauma. Pt states she has hx of arthritis and she missed a remicaide infusion recently so has had more pain. Pt states pain is making it difficult to transfer into wheelchair. Daughter has set up infusion for 1/30. Pt states she has not been taking anything for pain. Pt awake and alert, skin w/d,resps reg/unlabored.

## 2023-01-21 NOTE — ED QUICK NOTES
Pt states she has hx of falls and does not ambulate and pt and daughter do not feel as though medicar is a safe option for transport back to [facility]. Requesting S transport.

## 2023-01-21 NOTE — DISCHARGE INSTRUCTIONS
Continue your regular medications at facility   Warm compresses topically to joints that are bothering you  Continue your return if worse  Follow-up with your primary care physician on Monday

## 2023-01-21 NOTE — ED QUICK NOTES
Pt awake and alert, skin w/d,resps reg/unlabored. [facility] here for transport back to [facility]. Pt and daughter given discharge instructions.

## 2023-01-21 NOTE — ED QUICK NOTES
Pt appears comfortable on cart, states she is feeling better, wrist pain subsided but knee pain improved 8/10. Family at bedside.

## 2023-01-30 NOTE — PHYSICAL THERAPY NOTE
Anticoagulation Summary  As of 2023      INR goal:  2.0-3.0   TTR:  58.7 % (7.6 y)   INR used for dosin.00 (2023)   Warfarin maintenance plan:  3.75 mg (7.5 mg x 0.5) every Mon, Wed, Fri; 7.5 mg (7.5 mg x 1) all other days   Weekly warfarin total:  41.25 mg   No change documented:  LAMAR Bucio   Plan last modified:  LAMAR Bucio (2022)   Next INR check:  3/6/2023   Priority:  Acute   Target end date:  Indefinite    Indications    DVT (deep venous thrombosis) (HCC) (Resolved) [I82.409]  TIA (transient ischemic attack) [G45.9]  Pulmonary embolism [415.19] (Resolved) [I26.99]                 Anticoagulation Episode Summary       INR check location:  Anticoagulation Clinic    Preferred lab:  [facility]    Send INR reminders to:      Comments:  ASA 81 mg for TIA, CAD hx - [facility] per Dr. Wright 3/7/22          Anticoagulation Care Providers       Provider Role Specialty Phone number    Patricia Damon M.D. Referring Internal Medicine [phone]    Jona QuinnD Responsible      [facility] Responsible  [phone]                  Refer to Patient Findings for HPI:  Patient Findings       Negatives:  Signs/symptoms of thrombosis, Signs/symptoms of bleeding, Laboratory test error suspected, Change in health, Change in alcohol use, Change in activity, Upcoming invasive procedure, Emergency department visit, Upcoming dental procedure, Missed doses, Extra doses, Change in medications, Change in diet/appetite, Hospital admission, Bruising, Other complaints            Vitals:    23 1101   BP: 138/62       Verified current warfarin dosing schedule.    Medications reconciled   Pt is on ASA 81mg as antiplatelet therapy for TIA and must be reviewed again on next visit with cards.      A/P   INR  -therapeutic.     Warfarin dosing recommendation: Continue current regimen.    Pt educated to contact our clinic with any changes in medications or  PHYSICAL THERAPY TREATMENT NOTE - INPATIENT    Room Number: 763/000-W     Session: 1     Number of Visits to Meet Established Goals: 5     History related to current admission: Patient is a 78year old female admitted on 1/8/2022 from [facility] alarm; Low vision;Hard of hearing    WEIGHT BEARING RESTRICTION  Weight Bearing Restriction: None                PAIN ASSESSMENT   Ratin  Location: denies  Management Techniques:  Activity promotion;Repositioning    BALANCE s/s of bleeding or thrombosis. Pt is aware to seek immediate medical attention for falls, head injury or deep cuts.    Follow up appointment in 5 week(s).    TOREY Bcuio.     supervision and support of rail. Patient able to sit at EOB x 5 minutes with supervision. SPT bed to commode with cga. patient stood from commode with cga with support of rail and commode and stood x 2 minutes for hygiene and brief application.    SPT co

## 2023-02-16 ENCOUNTER — LAB REQUISITION (OUTPATIENT)
Dept: LAB | Facility: HOSPITAL | Age: 83
End: 2023-02-16
Payer: MEDICARE

## 2023-02-16 DIAGNOSIS — E11.21 TYPE 2 DIABETES MELLITUS WITH DIABETIC NEPHROPATHY (HCC): ICD-10-CM

## 2023-02-16 DIAGNOSIS — I10 ESSENTIAL (PRIMARY) HYPERTENSION: ICD-10-CM

## 2023-02-16 LAB
EST. AVERAGE GLUCOSE BLD GHB EST-MCNC: 243 MG/DL (ref 68–126)
HBA1C MFR BLD: 10.1 % (ref ?–5.7)

## 2023-02-16 PROCEDURE — 83036 HEMOGLOBIN GLYCOSYLATED A1C: CPT

## 2023-04-20 ENCOUNTER — LAB REQUISITION (OUTPATIENT)
Dept: LAB | Facility: HOSPITAL | Age: 83
End: 2023-04-20
Payer: MEDICARE

## 2023-04-20 DIAGNOSIS — M15.9 POLYOSTEOARTHRITIS, UNSPECIFIED: ICD-10-CM

## 2023-04-20 DIAGNOSIS — I10 ESSENTIAL (PRIMARY) HYPERTENSION: ICD-10-CM

## 2023-04-20 DIAGNOSIS — G47.33 OBSTRUCTIVE SLEEP APNEA (ADULT) (PEDIATRIC): ICD-10-CM

## 2023-04-20 DIAGNOSIS — E78.49 OTHER HYPERLIPIDEMIA: ICD-10-CM

## 2023-04-20 DIAGNOSIS — I65.23 OCCLUSION AND STENOSIS OF BILATERAL CAROTID ARTERIES: ICD-10-CM

## 2023-04-20 LAB
ALBUMIN SERPL-MCNC: 3 G/DL (ref 3.4–5)
ALBUMIN/GLOB SERPL: 0.7 {RATIO} (ref 1–2)
ALP LIVER SERPL-CCNC: 86 U/L
ALT SERPL-CCNC: 16 U/L
ANION GAP SERPL CALC-SCNC: 4 MMOL/L (ref 0–18)
AST SERPL-CCNC: 20 U/L (ref 15–37)
BASOPHILS # BLD AUTO: 0.06 X10(3) UL (ref 0–0.2)
BASOPHILS NFR BLD AUTO: 0.8 %
BILIRUB SERPL-MCNC: 0.2 MG/DL (ref 0.1–2)
BUN BLD-MCNC: 20 MG/DL (ref 7–18)
CALCIUM BLD-MCNC: 9.7 MG/DL (ref 8.5–10.1)
CHLORIDE SERPL-SCNC: 106 MMOL/L (ref 98–112)
CHOLEST SERPL-MCNC: 94 MG/DL (ref ?–200)
CO2 SERPL-SCNC: 28 MMOL/L (ref 21–32)
CREAT BLD-MCNC: 0.58 MG/DL
EOSINOPHIL # BLD AUTO: 0.28 X10(3) UL (ref 0–0.7)
EOSINOPHIL NFR BLD AUTO: 3.7 %
ERYTHROCYTE [DISTWIDTH] IN BLOOD BY AUTOMATED COUNT: 19.2 %
GFR SERPLBLD BASED ON 1.73 SQ M-ARVRAT: 89 ML/MIN/1.73M2 (ref 60–?)
GLOBULIN PLAS-MCNC: 4.6 G/DL (ref 2.8–4.4)
GLUCOSE BLD-MCNC: 62 MG/DL (ref 70–99)
HCT VFR BLD AUTO: 33.1 %
HDLC SERPL-MCNC: 41 MG/DL (ref 40–59)
HGB BLD-MCNC: 10 G/DL
IMM GRANULOCYTES # BLD AUTO: 0.01 X10(3) UL (ref 0–1)
IMM GRANULOCYTES NFR BLD: 0.1 %
LDLC SERPL CALC-MCNC: 30 MG/DL (ref ?–100)
LYMPHOCYTES # BLD AUTO: 3.6 X10(3) UL (ref 1–4)
LYMPHOCYTES NFR BLD AUTO: 48.2 %
MCH RBC QN AUTO: 26.2 PG (ref 26–34)
MCHC RBC AUTO-ENTMCNC: 30.2 G/DL (ref 31–37)
MCV RBC AUTO: 86.9 FL
MONOCYTES # BLD AUTO: 1.1 X10(3) UL (ref 0.1–1)
MONOCYTES NFR BLD AUTO: 14.7 %
NEUTROPHILS # BLD AUTO: 2.42 X10 (3) UL (ref 1.5–7.7)
NEUTROPHILS # BLD AUTO: 2.42 X10(3) UL (ref 1.5–7.7)
NEUTROPHILS NFR BLD AUTO: 32.5 %
NONHDLC SERPL-MCNC: 53 MG/DL (ref ?–130)
OSMOLALITY SERPL CALC.SUM OF ELEC: 287 MOSM/KG (ref 275–295)
PLATELET # BLD AUTO: 249 10(3)UL (ref 150–450)
POTASSIUM SERPL-SCNC: 3.6 MMOL/L (ref 3.5–5.1)
PROT SERPL-MCNC: 7.6 G/DL (ref 6.4–8.2)
RBC # BLD AUTO: 3.81 X10(6)UL
SODIUM SERPL-SCNC: 138 MMOL/L (ref 136–145)
TRIGL SERPL-MCNC: 134 MG/DL (ref 30–149)
VLDLC SERPL CALC-MCNC: 18 MG/DL (ref 0–30)
WBC # BLD AUTO: 7.5 X10(3) UL (ref 4–11)

## 2023-04-20 PROCEDURE — 80061 LIPID PANEL: CPT

## 2023-04-20 PROCEDURE — 80053 COMPREHEN METABOLIC PANEL: CPT

## 2023-04-20 PROCEDURE — 85025 COMPLETE CBC W/AUTO DIFF WBC: CPT

## 2023-05-16 ENCOUNTER — LAB REQUISITION (OUTPATIENT)
Dept: LAB | Facility: HOSPITAL | Age: 83
End: 2023-05-16
Payer: MEDICARE

## 2023-05-16 DIAGNOSIS — E11.21 TYPE 2 DIABETES MELLITUS WITH DIABETIC NEPHROPATHY (HCC): ICD-10-CM

## 2023-05-16 LAB
EST. AVERAGE GLUCOSE BLD GHB EST-MCNC: 154 MG/DL (ref 68–126)
HBA1C MFR BLD: 7 % (ref ?–5.7)

## 2023-05-16 PROCEDURE — 83036 HEMOGLOBIN GLYCOSYLATED A1C: CPT

## 2023-06-29 ENCOUNTER — LAB REQUISITION (OUTPATIENT)
Dept: LAB | Facility: HOSPITAL | Age: 83
End: 2023-06-29
Payer: MEDICARE

## 2023-06-29 DIAGNOSIS — M15.9 POLYOSTEOARTHRITIS, UNSPECIFIED: ICD-10-CM

## 2023-06-29 DIAGNOSIS — I10 ESSENTIAL (PRIMARY) HYPERTENSION: ICD-10-CM

## 2023-06-29 LAB
ALBUMIN SERPL-MCNC: 3.2 G/DL (ref 3.4–5)
ALBUMIN/GLOB SERPL: 0.7 {RATIO} (ref 1–2)
ALP LIVER SERPL-CCNC: 86 U/L
ALT SERPL-CCNC: 18 U/L
ANION GAP SERPL CALC-SCNC: 6 MMOL/L (ref 0–18)
AST SERPL-CCNC: 22 U/L (ref 15–37)
BASOPHILS # BLD AUTO: 0.04 X10(3) UL (ref 0–0.2)
BASOPHILS NFR BLD AUTO: 0.4 %
BILIRUB SERPL-MCNC: 0.2 MG/DL (ref 0.1–2)
BUN BLD-MCNC: 23 MG/DL (ref 7–18)
CALCIUM BLD-MCNC: 9.9 MG/DL (ref 8.5–10.1)
CHLORIDE SERPL-SCNC: 106 MMOL/L (ref 98–112)
CO2 SERPL-SCNC: 27 MMOL/L (ref 21–32)
CREAT BLD-MCNC: 0.67 MG/DL
EOSINOPHIL # BLD AUTO: 0.15 X10(3) UL (ref 0–0.7)
EOSINOPHIL NFR BLD AUTO: 1.7 %
ERYTHROCYTE [DISTWIDTH] IN BLOOD BY AUTOMATED COUNT: 15.9 %
FASTING STATUS PATIENT QL REPORTED: YES
GFR SERPLBLD BASED ON 1.73 SQ M-ARVRAT: 86 ML/MIN/1.73M2 (ref 60–?)
GLOBULIN PLAS-MCNC: 4.4 G/DL (ref 2.8–4.4)
GLUCOSE BLD-MCNC: 66 MG/DL (ref 70–99)
HCT VFR BLD AUTO: 34.2 %
HGB BLD-MCNC: 10.6 G/DL
IMM GRANULOCYTES # BLD AUTO: 0.01 X10(3) UL (ref 0–1)
IMM GRANULOCYTES NFR BLD: 0.1 %
LYMPHOCYTES # BLD AUTO: 4.03 X10(3) UL (ref 1–4)
LYMPHOCYTES NFR BLD AUTO: 44.9 %
MCH RBC QN AUTO: 26.2 PG (ref 26–34)
MCHC RBC AUTO-ENTMCNC: 31 G/DL (ref 31–37)
MCV RBC AUTO: 84.4 FL
MONOCYTES # BLD AUTO: 0.9 X10(3) UL (ref 0.1–1)
MONOCYTES NFR BLD AUTO: 10 %
NEUTROPHILS # BLD AUTO: 3.84 X10 (3) UL (ref 1.5–7.7)
NEUTROPHILS # BLD AUTO: 3.84 X10(3) UL (ref 1.5–7.7)
NEUTROPHILS NFR BLD AUTO: 42.9 %
OSMOLALITY SERPL CALC.SUM OF ELEC: 290 MOSM/KG (ref 275–295)
PLATELET # BLD AUTO: 269 10(3)UL (ref 150–450)
POTASSIUM SERPL-SCNC: 4.2 MMOL/L (ref 3.5–5.1)
PROT SERPL-MCNC: 7.6 G/DL (ref 6.4–8.2)
RBC # BLD AUTO: 4.05 X10(6)UL
SODIUM SERPL-SCNC: 139 MMOL/L (ref 136–145)
WBC # BLD AUTO: 9 X10(3) UL (ref 4–11)

## 2023-06-29 PROCEDURE — 85025 COMPLETE CBC W/AUTO DIFF WBC: CPT | Performed by: INTERNAL MEDICINE

## 2023-06-29 PROCEDURE — 80053 COMPREHEN METABOLIC PANEL: CPT | Performed by: INTERNAL MEDICINE

## 2023-08-02 PROCEDURE — 87493 C DIFF AMPLIFIED PROBE: CPT | Performed by: INTERNAL MEDICINE

## 2023-08-02 PROCEDURE — 87272 CRYPTOSPORIDIUM AG IF: CPT | Performed by: INTERNAL MEDICINE

## 2023-08-02 PROCEDURE — 87045 FECES CULTURE AEROBIC BACT: CPT | Performed by: INTERNAL MEDICINE

## 2023-08-02 PROCEDURE — 87077 CULTURE AEROBIC IDENTIFY: CPT | Performed by: INTERNAL MEDICINE

## 2023-08-02 PROCEDURE — 89055 LEUKOCYTE ASSESSMENT FECAL: CPT | Performed by: INTERNAL MEDICINE

## 2023-08-02 PROCEDURE — 87046 STOOL CULTR AEROBIC BACT EA: CPT | Performed by: INTERNAL MEDICINE

## 2023-08-02 PROCEDURE — 87329 GIARDIA AG IA: CPT | Performed by: INTERNAL MEDICINE

## 2023-08-02 PROCEDURE — 87427 SHIGA-LIKE TOXIN AG IA: CPT | Performed by: INTERNAL MEDICINE

## 2023-08-03 ENCOUNTER — LAB REQUISITION (OUTPATIENT)
Dept: LAB | Facility: HOSPITAL | Age: 83
End: 2023-08-03
Payer: MEDICARE

## 2023-08-03 DIAGNOSIS — R19.7 DIARRHEA, UNSPECIFIED: ICD-10-CM

## 2023-08-03 LAB
ALBUMIN SERPL-MCNC: 3.3 G/DL (ref 3.4–5)
ALBUMIN/GLOB SERPL: 0.8 {RATIO} (ref 1–2)
ALP LIVER SERPL-CCNC: 78 U/L
ALT SERPL-CCNC: 23 U/L
ANION GAP SERPL CALC-SCNC: 6 MMOL/L (ref 0–18)
AST SERPL-CCNC: 22 U/L (ref 15–37)
BASOPHILS # BLD AUTO: 0.04 X10(3) UL (ref 0–0.2)
BASOPHILS NFR BLD AUTO: 0.5 %
BILIRUB SERPL-MCNC: 0.3 MG/DL (ref 0.1–2)
BUN BLD-MCNC: 18 MG/DL (ref 7–18)
CALCIUM BLD-MCNC: 9.2 MG/DL (ref 8.5–10.1)
CHLORIDE SERPL-SCNC: 109 MMOL/L (ref 98–112)
CO2 SERPL-SCNC: 25 MMOL/L (ref 21–32)
CREAT BLD-MCNC: 0.87 MG/DL
CRYPTOSP AG STL QL IA: NEGATIVE
EGFRCR SERPLBLD CKD-EPI 2021: 65 ML/MIN/1.73M2 (ref 60–?)
EOSINOPHIL # BLD AUTO: 0.24 X10(3) UL (ref 0–0.7)
EOSINOPHIL NFR BLD AUTO: 3 %
ERYTHROCYTE [DISTWIDTH] IN BLOOD BY AUTOMATED COUNT: 17.9 %
FASTING STATUS PATIENT QL REPORTED: YES
G LAMBLIA AG STL QL IA: NEGATIVE
GLOBULIN PLAS-MCNC: 4 G/DL (ref 2.8–4.4)
GLUCOSE BLD-MCNC: 68 MG/DL (ref 70–99)
HCT VFR BLD AUTO: 32.2 %
HGB BLD-MCNC: 10 G/DL
IMM GRANULOCYTES # BLD AUTO: 0.01 X10(3) UL (ref 0–1)
IMM GRANULOCYTES NFR BLD: 0.1 %
LYMPHOCYTES # BLD AUTO: 4.29 X10(3) UL (ref 1–4)
LYMPHOCYTES NFR BLD AUTO: 53.9 %
MCH RBC QN AUTO: 26.5 PG (ref 26–34)
MCHC RBC AUTO-ENTMCNC: 31.1 G/DL (ref 31–37)
MCV RBC AUTO: 85.4 FL
MONOCYTES # BLD AUTO: 0.95 X10(3) UL (ref 0.1–1)
MONOCYTES NFR BLD AUTO: 11.9 %
NEUTROPHILS # BLD AUTO: 2.43 X10 (3) UL (ref 1.5–7.7)
NEUTROPHILS # BLD AUTO: 2.43 X10(3) UL (ref 1.5–7.7)
NEUTROPHILS NFR BLD AUTO: 30.6 %
OSMOLALITY SERPL CALC.SUM OF ELEC: 290 MOSM/KG (ref 275–295)
PLATELET # BLD AUTO: 244 10(3)UL (ref 150–450)
POTASSIUM SERPL-SCNC: 3.8 MMOL/L (ref 3.5–5.1)
PROT SERPL-MCNC: 7.3 G/DL (ref 6.4–8.2)
RBC # BLD AUTO: 3.77 X10(6)UL
SODIUM SERPL-SCNC: 140 MMOL/L (ref 136–145)
WBC # BLD AUTO: 8 X10(3) UL (ref 4–11)

## 2023-08-03 PROCEDURE — 80053 COMPREHEN METABOLIC PANEL: CPT | Performed by: INTERNAL MEDICINE

## 2023-08-03 PROCEDURE — 85025 COMPLETE CBC W/AUTO DIFF WBC: CPT | Performed by: INTERNAL MEDICINE

## 2023-08-04 LAB — C DIFF TOX B STL QL: POSITIVE

## 2023-08-08 ENCOUNTER — LAB REQUISITION (OUTPATIENT)
Dept: LAB | Facility: HOSPITAL | Age: 83
End: 2023-08-08
Payer: MEDICARE

## 2023-08-08 DIAGNOSIS — A09 INFECTIOUS GASTROENTERITIS AND COLITIS, UNSPECIFIED: ICD-10-CM

## 2023-08-08 LAB
ALBUMIN SERPL-MCNC: 3.4 G/DL (ref 3.4–5)
ALBUMIN/GLOB SERPL: 0.7 {RATIO} (ref 1–2)
ALP LIVER SERPL-CCNC: 96 U/L
ALT SERPL-CCNC: 19 U/L
ANION GAP SERPL CALC-SCNC: 4 MMOL/L (ref 0–18)
AST SERPL-CCNC: 20 U/L (ref 15–37)
BASOPHILS # BLD AUTO: 0.06 X10(3) UL (ref 0–0.2)
BASOPHILS NFR BLD AUTO: 0.6 %
BILIRUB SERPL-MCNC: 0.3 MG/DL (ref 0.1–2)
BUN BLD-MCNC: 11 MG/DL (ref 7–18)
CALCIUM BLD-MCNC: 8.8 MG/DL (ref 8.5–10.1)
CHLORIDE SERPL-SCNC: 106 MMOL/L (ref 98–112)
CO2 SERPL-SCNC: 29 MMOL/L (ref 21–32)
CREAT BLD-MCNC: 0.97 MG/DL
EGFRCR SERPLBLD CKD-EPI 2021: 57 ML/MIN/1.73M2 (ref 60–?)
EOSINOPHIL # BLD AUTO: 0.36 X10(3) UL (ref 0–0.7)
EOSINOPHIL NFR BLD AUTO: 3.5 %
ERYTHROCYTE [DISTWIDTH] IN BLOOD BY AUTOMATED COUNT: 18.1 %
FASTING STATUS PATIENT QL REPORTED: YES
GLOBULIN PLAS-MCNC: 4.8 G/DL (ref 2.8–4.4)
GLUCOSE BLD-MCNC: 84 MG/DL (ref 70–99)
HCT VFR BLD AUTO: 36.8 %
HGB BLD-MCNC: 11.1 G/DL
IMM GRANULOCYTES # BLD AUTO: 0.02 X10(3) UL (ref 0–1)
IMM GRANULOCYTES NFR BLD: 0.2 %
LYMPHOCYTES # BLD AUTO: 5.45 X10(3) UL (ref 1–4)
LYMPHOCYTES NFR BLD AUTO: 53.4 %
MCH RBC QN AUTO: 26.5 PG (ref 26–34)
MCHC RBC AUTO-ENTMCNC: 30.2 G/DL (ref 31–37)
MCV RBC AUTO: 87.8 FL
MONOCYTES # BLD AUTO: 0.98 X10(3) UL (ref 0.1–1)
MONOCYTES NFR BLD AUTO: 9.6 %
NEUTROPHILS # BLD AUTO: 3.33 X10 (3) UL (ref 1.5–7.7)
NEUTROPHILS # BLD AUTO: 3.33 X10(3) UL (ref 1.5–7.7)
NEUTROPHILS NFR BLD AUTO: 32.7 %
OSMOLALITY SERPL CALC.SUM OF ELEC: 287 MOSM/KG (ref 275–295)
PLATELET # BLD AUTO: 296 10(3)UL (ref 150–450)
POTASSIUM SERPL-SCNC: 3.5 MMOL/L (ref 3.5–5.1)
PROT SERPL-MCNC: 8.2 G/DL (ref 6.4–8.2)
RBC # BLD AUTO: 4.19 X10(6)UL
SODIUM SERPL-SCNC: 139 MMOL/L (ref 136–145)
WBC # BLD AUTO: 10.2 X10(3) UL (ref 4–11)

## 2023-08-08 PROCEDURE — 85025 COMPLETE CBC W/AUTO DIFF WBC: CPT | Performed by: INTERNAL MEDICINE

## 2023-08-08 PROCEDURE — 80053 COMPREHEN METABOLIC PANEL: CPT | Performed by: INTERNAL MEDICINE

## 2023-08-10 ENCOUNTER — APPOINTMENT (OUTPATIENT)
Dept: GENERAL RADIOLOGY | Facility: HOSPITAL | Age: 83
End: 2023-08-10
Attending: EMERGENCY MEDICINE
Payer: MEDICARE

## 2023-08-10 ENCOUNTER — HOSPITAL ENCOUNTER (EMERGENCY)
Facility: HOSPITAL | Age: 83
Discharge: HOME | End: 2023-08-10
Attending: EMERGENCY MEDICINE
Payer: MEDICARE

## 2023-08-10 VITALS
WEIGHT: 186 LBS | HEIGHT: 60 IN | OXYGEN SATURATION: 96 % | RESPIRATION RATE: 19 BRPM | BODY MASS INDEX: 36.52 KG/M2 | SYSTOLIC BLOOD PRESSURE: 156 MMHG | TEMPERATURE: 98 F | HEART RATE: 70 BPM | DIASTOLIC BLOOD PRESSURE: 72 MMHG

## 2023-08-10 DIAGNOSIS — R19.7 NAUSEA VOMITING AND DIARRHEA: Primary | ICD-10-CM

## 2023-08-10 DIAGNOSIS — R11.2 NAUSEA VOMITING AND DIARRHEA: Primary | ICD-10-CM

## 2023-08-10 LAB
ALBUMIN SERPL-MCNC: 3.5 G/DL (ref 3.4–5)
ALBUMIN/GLOB SERPL: 0.8 {RATIO} (ref 1–2)
ALP LIVER SERPL-CCNC: 72 U/L
ALT SERPL-CCNC: 19 U/L
ANION GAP SERPL CALC-SCNC: 6 MMOL/L (ref 0–18)
AST SERPL-CCNC: 18 U/L (ref 15–37)
BASOPHILS # BLD AUTO: 0.04 X10(3) UL (ref 0–0.2)
BASOPHILS NFR BLD AUTO: 0.4 %
BILIRUB SERPL-MCNC: 0.5 MG/DL (ref 0.1–2)
BUN BLD-MCNC: 13 MG/DL (ref 7–18)
CALCIUM BLD-MCNC: 8.9 MG/DL (ref 8.5–10.1)
CHLORIDE SERPL-SCNC: 105 MMOL/L (ref 98–112)
CO2 SERPL-SCNC: 25 MMOL/L (ref 21–32)
CREAT BLD-MCNC: 0.87 MG/DL
EGFRCR SERPLBLD CKD-EPI 2021: 65 ML/MIN/1.73M2 (ref 60–?)
EOSINOPHIL # BLD AUTO: 0.11 X10(3) UL (ref 0–0.7)
EOSINOPHIL NFR BLD AUTO: 1.2 %
ERYTHROCYTE [DISTWIDTH] IN BLOOD BY AUTOMATED COUNT: 17.4 %
GLOBULIN PLAS-MCNC: 4.4 G/DL (ref 2.8–4.4)
GLUCOSE BLD-MCNC: 175 MG/DL (ref 70–99)
HCT VFR BLD AUTO: 36.5 %
HGB BLD-MCNC: 11.3 G/DL
IMM GRANULOCYTES # BLD AUTO: 0.03 X10(3) UL (ref 0–1)
IMM GRANULOCYTES NFR BLD: 0.3 %
LIPASE SERPL-CCNC: 39 U/L (ref 13–75)
LYMPHOCYTES # BLD AUTO: 2.54 X10(3) UL (ref 1–4)
LYMPHOCYTES NFR BLD AUTO: 28.6 %
MCH RBC QN AUTO: 26.6 PG (ref 26–34)
MCHC RBC AUTO-ENTMCNC: 31 G/DL (ref 31–37)
MCV RBC AUTO: 85.9 FL
MONOCYTES # BLD AUTO: 0.84 X10(3) UL (ref 0.1–1)
MONOCYTES NFR BLD AUTO: 9.4 %
NEUTROPHILS # BLD AUTO: 5.33 X10 (3) UL (ref 1.5–7.7)
NEUTROPHILS # BLD AUTO: 5.33 X10(3) UL (ref 1.5–7.7)
NEUTROPHILS NFR BLD AUTO: 60.1 %
OSMOLALITY SERPL CALC.SUM OF ELEC: 286 MOSM/KG (ref 275–295)
PLATELET # BLD AUTO: 275 10(3)UL (ref 150–450)
POTASSIUM SERPL-SCNC: 3.7 MMOL/L (ref 3.5–5.1)
PROT SERPL-MCNC: 7.9 G/DL (ref 6.4–8.2)
RBC # BLD AUTO: 4.25 X10(6)UL
SODIUM SERPL-SCNC: 136 MMOL/L (ref 136–145)
WBC # BLD AUTO: 8.9 X10(3) UL (ref 4–11)

## 2023-08-10 PROCEDURE — 99285 EMERGENCY DEPT VISIT HI MDM: CPT

## 2023-08-10 PROCEDURE — 85025 COMPLETE CBC W/AUTO DIFF WBC: CPT | Performed by: EMERGENCY MEDICINE

## 2023-08-10 PROCEDURE — 74019 RADEX ABDOMEN 2 VIEWS: CPT | Performed by: EMERGENCY MEDICINE

## 2023-08-10 PROCEDURE — 83690 ASSAY OF LIPASE: CPT | Performed by: EMERGENCY MEDICINE

## 2023-08-10 PROCEDURE — 36415 COLL VENOUS BLD VENIPUNCTURE: CPT

## 2023-08-10 PROCEDURE — 80053 COMPREHEN METABOLIC PANEL: CPT | Performed by: EMERGENCY MEDICINE

## 2023-08-10 NOTE — ED INITIAL ASSESSMENT (HPI)
Pt diagnosed with cdiff last week and is on vancomycin. Pt with vomiting yesterday and dry heaves today.  Denies abd pain

## 2023-08-10 NOTE — ED QUICK NOTES
Attempted to call report to [facility]. Unable to talk to staff after call was transferred bt Alyx Villarreal from the facility. Spoke with pt's daughter Aliza Rachel and informed her of pt's discharge with permission from patient.  verbalizingunderstanding

## 2023-08-10 NOTE — ED QUICK NOTES
Pt reevaluated by dr. Valerie Vega. Pt informed of all her test reports and plan of care.  Verbalizing understanding

## 2023-08-10 NOTE — ED QUICK NOTES
Pt states that at the [facility] she's non ambulatory due to balance problem.  Pt is wheelchair bound

## 2023-08-15 ENCOUNTER — LAB REQUISITION (OUTPATIENT)
Dept: LAB | Facility: HOSPITAL | Age: 83
End: 2023-08-15
Payer: MEDICARE

## 2023-08-15 DIAGNOSIS — A04.72 ENTEROCOLITIS DUE TO CLOSTRIDIUM DIFFICILE, NOT SPECIFIED AS RECURRENT: ICD-10-CM

## 2023-08-15 DIAGNOSIS — M06.00 RHEUMATOID ARTHRITIS WITHOUT RHEUMATOID FACTOR, UNSPECIFIED SITE (HCC): ICD-10-CM

## 2023-08-15 LAB
ALBUMIN SERPL-MCNC: 3.2 G/DL (ref 3.4–5)
ALBUMIN/GLOB SERPL: 0.9 {RATIO} (ref 1–2)
ALP LIVER SERPL-CCNC: 68 U/L
ALT SERPL-CCNC: 22 U/L
ANION GAP SERPL CALC-SCNC: 3 MMOL/L (ref 0–18)
AST SERPL-CCNC: 21 U/L (ref 15–37)
BASOPHILS # BLD AUTO: 0.06 X10(3) UL (ref 0–0.2)
BASOPHILS NFR BLD AUTO: 0.7 %
BILIRUB SERPL-MCNC: 0.4 MG/DL (ref 0.1–2)
BUN BLD-MCNC: 14 MG/DL (ref 7–18)
CALCIUM BLD-MCNC: 9.3 MG/DL (ref 8.5–10.1)
CHLORIDE SERPL-SCNC: 109 MMOL/L (ref 98–112)
CO2 SERPL-SCNC: 27 MMOL/L (ref 21–32)
CREAT BLD-MCNC: 0.92 MG/DL
EGFRCR SERPLBLD CKD-EPI 2021: 61 ML/MIN/1.73M2 (ref 60–?)
EOSINOPHIL # BLD AUTO: 0.34 X10(3) UL (ref 0–0.7)
EOSINOPHIL NFR BLD AUTO: 3.9 %
ERYTHROCYTE [DISTWIDTH] IN BLOOD BY AUTOMATED COUNT: 17.9 %
FASTING STATUS PATIENT QL REPORTED: YES
GLOBULIN PLAS-MCNC: 3.6 G/DL (ref 2.8–4.4)
GLUCOSE BLD-MCNC: 63 MG/DL (ref 70–99)
HCT VFR BLD AUTO: 33.4 %
HGB BLD-MCNC: 10.5 G/DL
IMM GRANULOCYTES # BLD AUTO: 0.01 X10(3) UL (ref 0–1)
IMM GRANULOCYTES NFR BLD: 0.1 %
LYMPHOCYTES # BLD AUTO: 4.56 X10(3) UL (ref 1–4)
LYMPHOCYTES NFR BLD AUTO: 52.8 %
MCH RBC QN AUTO: 26.5 PG (ref 26–34)
MCHC RBC AUTO-ENTMCNC: 31.4 G/DL (ref 31–37)
MCV RBC AUTO: 84.3 FL
MONOCYTES # BLD AUTO: 0.95 X10(3) UL (ref 0.1–1)
MONOCYTES NFR BLD AUTO: 11 %
NEUTROPHILS # BLD AUTO: 2.71 X10 (3) UL (ref 1.5–7.7)
NEUTROPHILS # BLD AUTO: 2.71 X10(3) UL (ref 1.5–7.7)
NEUTROPHILS NFR BLD AUTO: 31.5 %
OSMOLALITY SERPL CALC.SUM OF ELEC: 287 MOSM/KG (ref 275–295)
PLATELET # BLD AUTO: 297 10(3)UL (ref 150–450)
POTASSIUM SERPL-SCNC: 3.5 MMOL/L (ref 3.5–5.1)
PROT SERPL-MCNC: 6.8 G/DL (ref 6.4–8.2)
RBC # BLD AUTO: 3.96 X10(6)UL
SODIUM SERPL-SCNC: 139 MMOL/L (ref 136–145)
WBC # BLD AUTO: 8.6 X10(3) UL (ref 4–11)

## 2023-08-15 PROCEDURE — 80053 COMPREHEN METABOLIC PANEL: CPT | Performed by: INTERNAL MEDICINE

## 2023-08-15 PROCEDURE — 85025 COMPLETE CBC W/AUTO DIFF WBC: CPT | Performed by: INTERNAL MEDICINE

## 2023-08-22 ENCOUNTER — LAB REQUISITION (OUTPATIENT)
Dept: LAB | Facility: HOSPITAL | Age: 83
End: 2023-08-22
Payer: MEDICARE

## 2023-08-22 DIAGNOSIS — A04.72 ENTEROCOLITIS DUE TO CLOSTRIDIUM DIFFICILE, NOT SPECIFIED AS RECURRENT: ICD-10-CM

## 2023-08-22 DIAGNOSIS — I10 ESSENTIAL (PRIMARY) HYPERTENSION: ICD-10-CM

## 2023-08-22 LAB
ALBUMIN SERPL-MCNC: 3.1 G/DL (ref 3.4–5)
ALBUMIN/GLOB SERPL: 0.8 {RATIO} (ref 1–2)
ALP LIVER SERPL-CCNC: 87 U/L
ALT SERPL-CCNC: 19 U/L
ANION GAP SERPL CALC-SCNC: 5 MMOL/L (ref 0–18)
AST SERPL-CCNC: 16 U/L (ref 15–37)
BASOPHILS # BLD AUTO: 0.06 X10(3) UL (ref 0–0.2)
BASOPHILS NFR BLD AUTO: 0.8 %
BILIRUB SERPL-MCNC: 0.3 MG/DL (ref 0.1–2)
BUN BLD-MCNC: 14 MG/DL (ref 7–18)
CALCIUM BLD-MCNC: 9.3 MG/DL (ref 8.5–10.1)
CHLORIDE SERPL-SCNC: 107 MMOL/L (ref 98–112)
CO2 SERPL-SCNC: 28 MMOL/L (ref 21–32)
CREAT BLD-MCNC: 0.7 MG/DL
EGFRCR SERPLBLD CKD-EPI 2021: 85 ML/MIN/1.73M2 (ref 60–?)
EOSINOPHIL # BLD AUTO: 0.3 X10(3) UL (ref 0–0.7)
EOSINOPHIL NFR BLD AUTO: 3.8 %
ERYTHROCYTE [DISTWIDTH] IN BLOOD BY AUTOMATED COUNT: 18.5 %
FASTING STATUS PATIENT QL REPORTED: YES
GLOBULIN PLAS-MCNC: 3.9 G/DL (ref 2.8–4.4)
GLUCOSE BLD-MCNC: 80 MG/DL (ref 70–99)
HCT VFR BLD AUTO: 32.4 %
HGB BLD-MCNC: 10.1 G/DL
IMM GRANULOCYTES # BLD AUTO: 0.01 X10(3) UL (ref 0–1)
IMM GRANULOCYTES NFR BLD: 0.1 %
LYMPHOCYTES # BLD AUTO: 4.07 X10(3) UL (ref 1–4)
LYMPHOCYTES NFR BLD AUTO: 51.7 %
MCH RBC QN AUTO: 26.6 PG (ref 26–34)
MCHC RBC AUTO-ENTMCNC: 31.2 G/DL (ref 31–37)
MCV RBC AUTO: 85.5 FL
MONOCYTES # BLD AUTO: 0.95 X10(3) UL (ref 0.1–1)
MONOCYTES NFR BLD AUTO: 12.1 %
NEUTROPHILS # BLD AUTO: 2.48 X10 (3) UL (ref 1.5–7.7)
NEUTROPHILS # BLD AUTO: 2.48 X10(3) UL (ref 1.5–7.7)
NEUTROPHILS NFR BLD AUTO: 31.5 %
OSMOLALITY SERPL CALC.SUM OF ELEC: 289 MOSM/KG (ref 275–295)
PLATELET # BLD AUTO: 265 10(3)UL (ref 150–450)
POTASSIUM SERPL-SCNC: 3.8 MMOL/L (ref 3.5–5.1)
PROT SERPL-MCNC: 7 G/DL (ref 6.4–8.2)
RBC # BLD AUTO: 3.79 X10(6)UL
SODIUM SERPL-SCNC: 140 MMOL/L (ref 136–145)
WBC # BLD AUTO: 7.9 X10(3) UL (ref 4–11)

## 2023-08-22 PROCEDURE — 85025 COMPLETE CBC W/AUTO DIFF WBC: CPT | Performed by: INTERNAL MEDICINE

## 2023-08-22 PROCEDURE — 80053 COMPREHEN METABOLIC PANEL: CPT | Performed by: INTERNAL MEDICINE

## 2023-09-26 ENCOUNTER — LAB REQUISITION (OUTPATIENT)
Dept: LAB | Facility: HOSPITAL | Age: 83
End: 2023-09-26
Payer: MEDICARE

## 2023-09-26 DIAGNOSIS — R05.9 COUGH, UNSPECIFIED: ICD-10-CM

## 2023-09-26 DIAGNOSIS — M06.00 RHEUMATOID ARTHRITIS WITHOUT RHEUMATOID FACTOR, UNSPECIFIED SITE (HCC): ICD-10-CM

## 2023-09-26 DIAGNOSIS — R09.02 HYPOXEMIA: ICD-10-CM

## 2023-09-26 LAB
ALBUMIN SERPL-MCNC: 3 G/DL (ref 3.4–5)
ALBUMIN/GLOB SERPL: 0.6 {RATIO} (ref 1–2)
ALP LIVER SERPL-CCNC: 104 U/L
ALT SERPL-CCNC: 19 U/L
ANION GAP SERPL CALC-SCNC: 5 MMOL/L (ref 0–18)
AST SERPL-CCNC: 24 U/L (ref 15–37)
BASOPHILS # BLD AUTO: 0.06 X10(3) UL (ref 0–0.2)
BASOPHILS NFR BLD AUTO: 0.8 %
BILIRUB SERPL-MCNC: 0.3 MG/DL (ref 0.1–2)
BUN BLD-MCNC: 15 MG/DL (ref 7–18)
CALCIUM BLD-MCNC: 10 MG/DL (ref 8.5–10.1)
CHLORIDE SERPL-SCNC: 105 MMOL/L (ref 98–112)
CO2 SERPL-SCNC: 29 MMOL/L (ref 21–32)
CREAT BLD-MCNC: 0.71 MG/DL
EGFRCR SERPLBLD CKD-EPI 2021: 83 ML/MIN/1.73M2 (ref 60–?)
EOSINOPHIL # BLD AUTO: 0.35 X10(3) UL (ref 0–0.7)
EOSINOPHIL NFR BLD AUTO: 4.4 %
ERYTHROCYTE [DISTWIDTH] IN BLOOD BY AUTOMATED COUNT: 16.3 %
GLOBULIN PLAS-MCNC: 4.9 G/DL (ref 2.8–4.4)
GLUCOSE BLD-MCNC: 109 MG/DL (ref 70–99)
HCT VFR BLD AUTO: 32.6 %
HGB BLD-MCNC: 10 G/DL
IMM GRANULOCYTES # BLD AUTO: 0.01 X10(3) UL (ref 0–1)
IMM GRANULOCYTES NFR BLD: 0.1 %
LYMPHOCYTES # BLD AUTO: 4.04 X10(3) UL (ref 1–4)
LYMPHOCYTES NFR BLD AUTO: 50.6 %
MCH RBC QN AUTO: 27.4 PG (ref 26–34)
MCHC RBC AUTO-ENTMCNC: 30.7 G/DL (ref 31–37)
MCV RBC AUTO: 89.3 FL
MONOCYTES # BLD AUTO: 0.9 X10(3) UL (ref 0.1–1)
MONOCYTES NFR BLD AUTO: 11.3 %
NEUTROPHILS # BLD AUTO: 2.62 X10 (3) UL (ref 1.5–7.7)
NEUTROPHILS # BLD AUTO: 2.62 X10(3) UL (ref 1.5–7.7)
NEUTROPHILS NFR BLD AUTO: 32.8 %
OSMOLALITY SERPL CALC.SUM OF ELEC: 289 MOSM/KG (ref 275–295)
PLATELET # BLD AUTO: 305 10(3)UL (ref 150–450)
POTASSIUM SERPL-SCNC: 3.6 MMOL/L (ref 3.5–5.1)
PROT SERPL-MCNC: 7.9 G/DL (ref 6.4–8.2)
RBC # BLD AUTO: 3.65 X10(6)UL
SODIUM SERPL-SCNC: 139 MMOL/L (ref 136–145)
WBC # BLD AUTO: 8 X10(3) UL (ref 4–11)

## 2023-09-26 PROCEDURE — 80053 COMPREHEN METABOLIC PANEL: CPT | Performed by: INTERNAL MEDICINE

## 2023-09-26 PROCEDURE — 85025 COMPLETE CBC W/AUTO DIFF WBC: CPT | Performed by: INTERNAL MEDICINE

## 2023-11-30 ENCOUNTER — LAB REQUISITION (OUTPATIENT)
Dept: LAB | Facility: HOSPITAL | Age: 83
End: 2023-11-30
Payer: MEDICARE

## 2023-11-30 DIAGNOSIS — E11.3299 TYPE 2 DIABETES MELLITUS WITH MILD NONPROLIFERATIVE DIABETIC RETINOPATHY WITHOUT MACULAR EDEMA, UNSPECIFIED EYE (HCC): ICD-10-CM

## 2023-11-30 DIAGNOSIS — I10 ESSENTIAL (PRIMARY) HYPERTENSION: ICD-10-CM

## 2023-11-30 LAB
ALBUMIN SERPL-MCNC: 3.3 G/DL (ref 3.4–5)
ALBUMIN/GLOB SERPL: 0.8 {RATIO} (ref 1–2)
ALP LIVER SERPL-CCNC: 74 U/L
ALT SERPL-CCNC: 13 U/L
ANION GAP SERPL CALC-SCNC: 4 MMOL/L (ref 0–18)
AST SERPL-CCNC: 14 U/L (ref 15–37)
BASOPHILS # BLD AUTO: 0.04 X10(3) UL (ref 0–0.2)
BASOPHILS NFR BLD AUTO: 0.5 %
BILIRUB SERPL-MCNC: 0.3 MG/DL (ref 0.1–2)
BUN BLD-MCNC: 20 MG/DL (ref 9–23)
CALCIUM BLD-MCNC: 9.2 MG/DL (ref 8.5–10.1)
CHLORIDE SERPL-SCNC: 106 MMOL/L (ref 98–112)
CO2 SERPL-SCNC: 31 MMOL/L (ref 21–32)
CREAT BLD-MCNC: 0.76 MG/DL
EGFRCR SERPLBLD CKD-EPI 2021: 77 ML/MIN/1.73M2 (ref 60–?)
EOSINOPHIL # BLD AUTO: 0.29 X10(3) UL (ref 0–0.7)
EOSINOPHIL NFR BLD AUTO: 3.3 %
ERYTHROCYTE [DISTWIDTH] IN BLOOD BY AUTOMATED COUNT: 14.6 %
FASTING STATUS PATIENT QL REPORTED: YES
GLOBULIN PLAS-MCNC: 3.9 G/DL (ref 2.8–4.4)
GLUCOSE BLD-MCNC: 80 MG/DL (ref 70–99)
HCT VFR BLD AUTO: 32.6 %
HGB BLD-MCNC: 10.4 G/DL
IMM GRANULOCYTES # BLD AUTO: 0.02 X10(3) UL (ref 0–1)
IMM GRANULOCYTES NFR BLD: 0.2 %
LYMPHOCYTES # BLD AUTO: 3.8 X10(3) UL (ref 1–4)
LYMPHOCYTES NFR BLD AUTO: 43.1 %
MCH RBC QN AUTO: 28.7 PG (ref 26–34)
MCHC RBC AUTO-ENTMCNC: 31.9 G/DL (ref 31–37)
MCV RBC AUTO: 90.1 FL
MONOCYTES # BLD AUTO: 0.97 X10(3) UL (ref 0.1–1)
MONOCYTES NFR BLD AUTO: 11 %
NEUTROPHILS # BLD AUTO: 3.7 X10 (3) UL (ref 1.5–7.7)
NEUTROPHILS # BLD AUTO: 3.7 X10(3) UL (ref 1.5–7.7)
NEUTROPHILS NFR BLD AUTO: 41.9 %
OSMOLALITY SERPL CALC.SUM OF ELEC: 294 MOSM/KG (ref 275–295)
PLATELET # BLD AUTO: 240 10(3)UL (ref 150–450)
POTASSIUM SERPL-SCNC: 3.9 MMOL/L (ref 3.5–5.1)
PROT SERPL-MCNC: 7.2 G/DL (ref 6.4–8.2)
RBC # BLD AUTO: 3.62 X10(6)UL
SODIUM SERPL-SCNC: 141 MMOL/L (ref 136–145)
WBC # BLD AUTO: 8.8 X10(3) UL (ref 4–11)

## 2023-11-30 PROCEDURE — 85025 COMPLETE CBC W/AUTO DIFF WBC: CPT | Performed by: INTERNAL MEDICINE

## 2023-11-30 PROCEDURE — 80053 COMPREHEN METABOLIC PANEL: CPT | Performed by: INTERNAL MEDICINE

## 2024-02-22 ENCOUNTER — LAB REQUISITION (OUTPATIENT)
Dept: LAB | Facility: HOSPITAL | Age: 84
End: 2024-02-22
Payer: MEDICARE

## 2024-02-22 DIAGNOSIS — R19.7 DIARRHEA, UNSPECIFIED: ICD-10-CM

## 2024-02-22 DIAGNOSIS — E11.59 TYPE 2 DIABETES MELLITUS WITH OTHER CIRCULATORY COMPLICATIONS (HCC): ICD-10-CM

## 2024-02-22 LAB
ALBUMIN SERPL-MCNC: 3.3 G/DL (ref 3.4–5)
ALBUMIN/GLOB SERPL: 0.8 {RATIO} (ref 1–2)
ALP LIVER SERPL-CCNC: 66 U/L
ALT SERPL-CCNC: 15 U/L
ANION GAP SERPL CALC-SCNC: 3 MMOL/L (ref 0–18)
AST SERPL-CCNC: 17 U/L (ref 15–37)
BASOPHILS # BLD AUTO: 0.04 X10(3) UL (ref 0–0.2)
BASOPHILS NFR BLD AUTO: 0.5 %
BILIRUB SERPL-MCNC: 0.4 MG/DL (ref 0.1–2)
BUN BLD-MCNC: 16 MG/DL (ref 9–23)
CALCIUM BLD-MCNC: 9.5 MG/DL (ref 8.5–10.1)
CHLORIDE SERPL-SCNC: 110 MMOL/L (ref 98–112)
CO2 SERPL-SCNC: 28 MMOL/L (ref 21–32)
CREAT BLD-MCNC: 0.76 MG/DL
EGFRCR SERPLBLD CKD-EPI 2021: 77 ML/MIN/1.73M2 (ref 60–?)
EOSINOPHIL # BLD AUTO: 0.19 X10(3) UL (ref 0–0.7)
EOSINOPHIL NFR BLD AUTO: 2.6 %
ERYTHROCYTE [DISTWIDTH] IN BLOOD BY AUTOMATED COUNT: 14.6 %
EST. AVERAGE GLUCOSE BLD GHB EST-MCNC: 163 MG/DL (ref 68–126)
FASTING STATUS PATIENT QL REPORTED: YES
GLOBULIN PLAS-MCNC: 4 G/DL (ref 2.8–4.4)
GLUCOSE BLD-MCNC: 68 MG/DL (ref 70–99)
HBA1C MFR BLD: 7.3 % (ref ?–5.7)
HCT VFR BLD AUTO: 32.2 %
HGB BLD-MCNC: 10.1 G/DL
IMM GRANULOCYTES # BLD AUTO: 0.01 X10(3) UL (ref 0–1)
IMM GRANULOCYTES NFR BLD: 0.1 %
LYMPHOCYTES # BLD AUTO: 3.69 X10(3) UL (ref 1–4)
LYMPHOCYTES NFR BLD AUTO: 49.5 %
MCH RBC QN AUTO: 28.1 PG (ref 26–34)
MCHC RBC AUTO-ENTMCNC: 31.4 G/DL (ref 31–37)
MCV RBC AUTO: 89.7 FL
MONOCYTES # BLD AUTO: 0.87 X10(3) UL (ref 0.1–1)
MONOCYTES NFR BLD AUTO: 11.7 %
NEUTROPHILS # BLD AUTO: 2.65 X10 (3) UL (ref 1.5–7.7)
NEUTROPHILS # BLD AUTO: 2.65 X10(3) UL (ref 1.5–7.7)
NEUTROPHILS NFR BLD AUTO: 35.6 %
OSMOLALITY SERPL CALC.SUM OF ELEC: 291 MOSM/KG (ref 275–295)
PLATELET # BLD AUTO: 273 10(3)UL (ref 150–450)
POTASSIUM SERPL-SCNC: 3.5 MMOL/L (ref 3.5–5.1)
PROT SERPL-MCNC: 7.3 G/DL (ref 6.4–8.2)
RBC # BLD AUTO: 3.59 X10(6)UL
SODIUM SERPL-SCNC: 141 MMOL/L (ref 136–145)
WBC # BLD AUTO: 7.5 X10(3) UL (ref 4–11)

## 2024-02-22 PROCEDURE — 87493 C DIFF AMPLIFIED PROBE: CPT | Performed by: INTERNAL MEDICINE

## 2024-02-22 PROCEDURE — 85025 COMPLETE CBC W/AUTO DIFF WBC: CPT | Performed by: INTERNAL MEDICINE

## 2024-02-22 PROCEDURE — 80053 COMPREHEN METABOLIC PANEL: CPT | Performed by: INTERNAL MEDICINE

## 2024-02-22 PROCEDURE — 83036 HEMOGLOBIN GLYCOSYLATED A1C: CPT | Performed by: INTERNAL MEDICINE

## 2024-02-23 LAB — C DIFF TOX B STL QL: NEGATIVE

## 2024-02-27 ENCOUNTER — LAB REQUISITION (OUTPATIENT)
Dept: LAB | Facility: HOSPITAL | Age: 84
End: 2024-02-27
Payer: MEDICARE

## 2024-02-27 DIAGNOSIS — M06.00 RHEUMATOID ARTHRITIS WITHOUT RHEUMATOID FACTOR, UNSPECIFIED SITE (HCC): ICD-10-CM

## 2024-02-27 DIAGNOSIS — I10 ESSENTIAL (PRIMARY) HYPERTENSION: ICD-10-CM

## 2024-02-27 DIAGNOSIS — M17.12 UNILATERAL PRIMARY OSTEOARTHRITIS, LEFT KNEE: ICD-10-CM

## 2024-02-27 LAB
CRP SERPL-MCNC: <0.29 MG/DL (ref ?–0.3)
ERYTHROCYTE [SEDIMENTATION RATE] IN BLOOD: 42 MM/HR

## 2024-02-27 PROCEDURE — 86140 C-REACTIVE PROTEIN: CPT | Performed by: INTERNAL MEDICINE

## 2024-02-27 PROCEDURE — 85652 RBC SED RATE AUTOMATED: CPT | Performed by: INTERNAL MEDICINE

## 2024-05-21 ENCOUNTER — LAB REQUISITION (OUTPATIENT)
Dept: LAB | Facility: HOSPITAL | Age: 84
End: 2024-05-21

## 2024-05-21 DIAGNOSIS — Z87.81 PERSONAL HISTORY OF (HEALED) TRAUMATIC FRACTURE: ICD-10-CM

## 2024-05-21 DIAGNOSIS — E55.9 VITAMIN D DEFICIENCY, UNSPECIFIED: ICD-10-CM

## 2024-05-21 LAB
ALBUMIN SERPL-MCNC: 3.3 G/DL (ref 3.4–5)
ALBUMIN/GLOB SERPL: 0.8 {RATIO} (ref 1–2)
ALP LIVER SERPL-CCNC: 85 U/L
ALT SERPL-CCNC: 10 U/L
ANION GAP SERPL CALC-SCNC: 5 MMOL/L (ref 0–18)
AST SERPL-CCNC: 16 U/L (ref 15–37)
BASOPHILS # BLD AUTO: 0.06 X10(3) UL (ref 0–0.2)
BASOPHILS NFR BLD AUTO: 0.8 %
BILIRUB SERPL-MCNC: 0.5 MG/DL (ref 0.1–2)
BUN BLD-MCNC: 14 MG/DL (ref 9–23)
CALCIUM BLD-MCNC: 9 MG/DL (ref 8.5–10.1)
CHLORIDE SERPL-SCNC: 111 MMOL/L (ref 98–112)
CO2 SERPL-SCNC: 26 MMOL/L (ref 21–32)
CREAT BLD-MCNC: 0.58 MG/DL
EGFRCR SERPLBLD CKD-EPI 2021: 89 ML/MIN/1.73M2 (ref 60–?)
EOSINOPHIL # BLD AUTO: 0.28 X10(3) UL (ref 0–0.7)
EOSINOPHIL NFR BLD AUTO: 3.7 %
ERYTHROCYTE [DISTWIDTH] IN BLOOD BY AUTOMATED COUNT: 16.2 %
EST. AVERAGE GLUCOSE BLD GHB EST-MCNC: 148 MG/DL (ref 68–126)
FASTING STATUS PATIENT QL REPORTED: YES
GLOBULIN PLAS-MCNC: 3.9 G/DL (ref 2.8–4.4)
GLUCOSE BLD-MCNC: 94 MG/DL (ref 70–99)
HBA1C MFR BLD: 6.8 % (ref ?–5.7)
HCT VFR BLD AUTO: 28.6 %
HGB BLD-MCNC: 9 G/DL
IMM GRANULOCYTES # BLD AUTO: 0.01 X10(3) UL (ref 0–1)
IMM GRANULOCYTES NFR BLD: 0.1 %
LYMPHOCYTES # BLD AUTO: 2.81 X10(3) UL (ref 1–4)
LYMPHOCYTES NFR BLD AUTO: 37.6 %
MCH RBC QN AUTO: 28.3 PG (ref 26–34)
MCHC RBC AUTO-ENTMCNC: 31.5 G/DL (ref 31–37)
MCV RBC AUTO: 89.9 FL
MONOCYTES # BLD AUTO: 0.89 X10(3) UL (ref 0.1–1)
MONOCYTES NFR BLD AUTO: 11.9 %
NEUTROPHILS # BLD AUTO: 3.43 X10 (3) UL (ref 1.5–7.7)
NEUTROPHILS # BLD AUTO: 3.43 X10(3) UL (ref 1.5–7.7)
NEUTROPHILS NFR BLD AUTO: 45.9 %
OSMOLALITY SERPL CALC.SUM OF ELEC: 294 MOSM/KG (ref 275–295)
PLATELET # BLD AUTO: 239 10(3)UL (ref 150–450)
POTASSIUM SERPL-SCNC: 3.6 MMOL/L (ref 3.5–5.1)
PROT SERPL-MCNC: 7.2 G/DL (ref 6.4–8.2)
RBC # BLD AUTO: 3.18 X10(6)UL
SODIUM SERPL-SCNC: 142 MMOL/L (ref 136–145)
WBC # BLD AUTO: 7.5 X10(3) UL (ref 4–11)

## 2024-05-21 PROCEDURE — 80053 COMPREHEN METABOLIC PANEL: CPT | Performed by: OBSTETRICS & GYNECOLOGY

## 2024-05-21 PROCEDURE — 85025 COMPLETE CBC W/AUTO DIFF WBC: CPT | Performed by: OBSTETRICS & GYNECOLOGY

## 2024-05-21 PROCEDURE — 83036 HEMOGLOBIN GLYCOSYLATED A1C: CPT | Performed by: OBSTETRICS & GYNECOLOGY

## 2024-05-28 ENCOUNTER — LAB REQUISITION (OUTPATIENT)
Dept: LAB | Facility: HOSPITAL | Age: 84
End: 2024-05-28
Payer: MEDICARE

## 2024-05-28 DIAGNOSIS — I10 ESSENTIAL (PRIMARY) HYPERTENSION: ICD-10-CM

## 2024-05-28 DIAGNOSIS — I65.23 OCCLUSION AND STENOSIS OF BILATERAL CAROTID ARTERIES: ICD-10-CM

## 2024-05-28 LAB
ALBUMIN SERPL-MCNC: 3.3 G/DL (ref 3.4–5)
ALBUMIN/GLOB SERPL: 0.8 {RATIO} (ref 1–2)
ALP LIVER SERPL-CCNC: 75 U/L
ALT SERPL-CCNC: 10 U/L
ANION GAP SERPL CALC-SCNC: 6 MMOL/L (ref 0–18)
AST SERPL-CCNC: 13 U/L (ref 15–37)
BASOPHILS # BLD AUTO: 0.05 X10(3) UL (ref 0–0.2)
BASOPHILS NFR BLD AUTO: 0.7 %
BILIRUB SERPL-MCNC: 0.5 MG/DL (ref 0.1–2)
BUN BLD-MCNC: 16 MG/DL (ref 9–23)
CALCIUM BLD-MCNC: 9.1 MG/DL (ref 8.5–10.1)
CHLORIDE SERPL-SCNC: 108 MMOL/L (ref 98–112)
CO2 SERPL-SCNC: 27 MMOL/L (ref 21–32)
CREAT BLD-MCNC: 0.59 MG/DL
CRP SERPL-MCNC: <0.29 MG/DL (ref ?–0.3)
EGFRCR SERPLBLD CKD-EPI 2021: 88 ML/MIN/1.73M2 (ref 60–?)
EOSINOPHIL # BLD AUTO: 0.24 X10(3) UL (ref 0–0.7)
EOSINOPHIL NFR BLD AUTO: 3.2 %
ERYTHROCYTE [DISTWIDTH] IN BLOOD BY AUTOMATED COUNT: 16.2 %
ERYTHROCYTE [SEDIMENTATION RATE] IN BLOOD: 42 MM/HR
FASTING STATUS PATIENT QL REPORTED: YES
GLOBULIN PLAS-MCNC: 4.1 G/DL (ref 2.8–4.4)
GLUCOSE BLD-MCNC: 80 MG/DL (ref 70–99)
HCT VFR BLD AUTO: 30.2 %
HGB BLD-MCNC: 9.3 G/DL
IMM GRANULOCYTES # BLD AUTO: 0.02 X10(3) UL (ref 0–1)
IMM GRANULOCYTES NFR BLD: 0.3 %
LYMPHOCYTES # BLD AUTO: 3.51 X10(3) UL (ref 1–4)
LYMPHOCYTES NFR BLD AUTO: 47.1 %
MCH RBC QN AUTO: 27.8 PG (ref 26–34)
MCHC RBC AUTO-ENTMCNC: 30.8 G/DL (ref 31–37)
MCV RBC AUTO: 90.4 FL
MONOCYTES # BLD AUTO: 0.82 X10(3) UL (ref 0.1–1)
MONOCYTES NFR BLD AUTO: 11 %
NEUTROPHILS # BLD AUTO: 2.82 X10 (3) UL (ref 1.5–7.7)
NEUTROPHILS # BLD AUTO: 2.82 X10(3) UL (ref 1.5–7.7)
NEUTROPHILS NFR BLD AUTO: 37.7 %
OSMOLALITY SERPL CALC.SUM OF ELEC: 292 MOSM/KG (ref 275–295)
PLATELET # BLD AUTO: 254 10(3)UL (ref 150–450)
POTASSIUM SERPL-SCNC: 3.6 MMOL/L (ref 3.5–5.1)
PROT SERPL-MCNC: 7.4 G/DL (ref 6.4–8.2)
RBC # BLD AUTO: 3.34 X10(6)UL
SODIUM SERPL-SCNC: 141 MMOL/L (ref 136–145)
WBC # BLD AUTO: 7.5 X10(3) UL (ref 4–11)

## 2024-05-28 PROCEDURE — 85652 RBC SED RATE AUTOMATED: CPT | Performed by: INTERNAL MEDICINE

## 2024-05-28 PROCEDURE — 80053 COMPREHEN METABOLIC PANEL: CPT | Performed by: INTERNAL MEDICINE

## 2024-05-28 PROCEDURE — 85025 COMPLETE CBC W/AUTO DIFF WBC: CPT | Performed by: INTERNAL MEDICINE

## 2024-05-28 PROCEDURE — 83036 HEMOGLOBIN GLYCOSYLATED A1C: CPT | Performed by: INTERNAL MEDICINE

## 2024-05-28 PROCEDURE — 86140 C-REACTIVE PROTEIN: CPT | Performed by: INTERNAL MEDICINE

## 2024-06-06 ENCOUNTER — LAB ENCOUNTER (OUTPATIENT)
Dept: LAB | Age: 84
End: 2024-06-06
Attending: INTERNAL MEDICINE
Payer: MEDICARE

## 2024-06-14 ENCOUNTER — HOSPITAL ENCOUNTER (INPATIENT)
Facility: HOSPITAL | Age: 84
LOS: 6 days | Discharge: HOME HEALTH | DRG: 291 | End: 2024-06-20
Attending: EMERGENCY MEDICINE | Admitting: HOSPITALIST

## 2024-06-14 ENCOUNTER — APPOINTMENT (OUTPATIENT)
Dept: GENERAL RADIOLOGY | Facility: HOSPITAL | Age: 84
DRG: 291 | End: 2024-06-14
Attending: EMERGENCY MEDICINE

## 2024-06-14 DIAGNOSIS — I50.9 ACUTE ON CHRONIC CONGESTIVE HEART FAILURE, UNSPECIFIED HEART FAILURE TYPE (HCC): Primary | ICD-10-CM

## 2024-06-14 PROBLEM — R79.89 AZOTEMIA: Status: ACTIVE | Noted: 2024-06-14

## 2024-06-14 PROBLEM — D64.9 ANEMIA: Status: ACTIVE | Noted: 2024-06-14

## 2024-06-14 LAB
ALBUMIN SERPL-MCNC: 3.3 G/DL (ref 3.4–5)
ALBUMIN/GLOB SERPL: 0.7 {RATIO} (ref 1–2)
ALP LIVER SERPL-CCNC: 148 U/L
ALT SERPL-CCNC: 10 U/L
ANION GAP SERPL CALC-SCNC: 6 MMOL/L (ref 0–18)
APTT PPP: 115.2 SECONDS (ref 23–36)
APTT PPP: 34.9 SECONDS (ref 23–36)
AST SERPL-CCNC: 33 U/L (ref 15–37)
BASOPHILS # BLD AUTO: 0.03 X10(3) UL (ref 0–0.2)
BASOPHILS NFR BLD AUTO: 0.3 %
BILIRUB SERPL-MCNC: 0.7 MG/DL (ref 0.1–2)
BUN BLD-MCNC: 20 MG/DL (ref 9–23)
CALCIUM BLD-MCNC: 9.2 MG/DL (ref 8.5–10.1)
CHLORIDE SERPL-SCNC: 108 MMOL/L (ref 98–112)
CO2 SERPL-SCNC: 26 MMOL/L (ref 21–32)
CREAT BLD-MCNC: 0.97 MG/DL
EGFRCR SERPLBLD CKD-EPI 2021: 57 ML/MIN/1.73M2 (ref 60–?)
EOSINOPHIL # BLD AUTO: 0.13 X10(3) UL (ref 0–0.7)
EOSINOPHIL NFR BLD AUTO: 1.5 %
ERYTHROCYTE [DISTWIDTH] IN BLOOD BY AUTOMATED COUNT: 16.2 %
GLOBULIN PLAS-MCNC: 4.7 G/DL (ref 2.8–4.4)
GLUCOSE BLD-MCNC: 120 MG/DL (ref 70–99)
GLUCOSE BLD-MCNC: 138 MG/DL (ref 70–99)
GLUCOSE BLD-MCNC: 199 MG/DL (ref 70–99)
HCT VFR BLD AUTO: 30.2 %
HGB BLD-MCNC: 9.1 G/DL
IMM GRANULOCYTES # BLD AUTO: 0.04 X10(3) UL (ref 0–1)
IMM GRANULOCYTES NFR BLD: 0.5 %
INR BLD: 1.27 (ref 0.8–1.2)
LYMPHOCYTES # BLD AUTO: 2.02 X10(3) UL (ref 1–4)
LYMPHOCYTES NFR BLD AUTO: 23.1 %
MCH RBC QN AUTO: 27.1 PG (ref 26–34)
MCHC RBC AUTO-ENTMCNC: 30.1 G/DL (ref 31–37)
MCV RBC AUTO: 89.9 FL
MONOCYTES # BLD AUTO: 0.85 X10(3) UL (ref 0.1–1)
MONOCYTES NFR BLD AUTO: 9.7 %
NEUTROPHILS # BLD AUTO: 5.68 X10 (3) UL (ref 1.5–7.7)
NEUTROPHILS # BLD AUTO: 5.68 X10(3) UL (ref 1.5–7.7)
NEUTROPHILS NFR BLD AUTO: 64.9 %
NT-PROBNP SERPL-MCNC: 1490 PG/ML (ref ?–450)
OSMOLALITY SERPL CALC.SUM OF ELEC: 298 MOSM/KG (ref 275–295)
PLATELET # BLD AUTO: 304 10(3)UL (ref 150–450)
POTASSIUM SERPL-SCNC: 4.6 MMOL/L (ref 3.5–5.1)
PROT SERPL-MCNC: 8 G/DL (ref 6.4–8.2)
PROTHROMBIN TIME: 16 SECONDS (ref 11.6–14.8)
Q-T INTERVAL: 400 MS
QRS DURATION: 86 MS
QTC CALCULATION (BEZET): 450 MS
R AXIS: 14 DEGREES
RBC # BLD AUTO: 3.36 X10(6)UL
SARS-COV-2 RNA RESP QL NAA+PROBE: NOT DETECTED
SODIUM SERPL-SCNC: 140 MMOL/L (ref 136–145)
T AXIS: -3 DEGREES
TROPONIN I SERPL HS-MCNC: 10 NG/L
TSI SER-ACNC: 1.07 MIU/ML (ref 0.36–3.74)
VENTRICULAR RATE: 76 BPM
WBC # BLD AUTO: 8.8 X10(3) UL (ref 4–11)

## 2024-06-14 PROCEDURE — 93005 ELECTROCARDIOGRAM TRACING: CPT

## 2024-06-14 PROCEDURE — 71045 X-RAY EXAM CHEST 1 VIEW: CPT | Performed by: EMERGENCY MEDICINE

## 2024-06-14 PROCEDURE — 84443 ASSAY THYROID STIM HORMONE: CPT | Performed by: INTERNAL MEDICINE

## 2024-06-14 PROCEDURE — 85610 PROTHROMBIN TIME: CPT | Performed by: EMERGENCY MEDICINE

## 2024-06-14 PROCEDURE — 80053 COMPREHEN METABOLIC PANEL: CPT | Performed by: EMERGENCY MEDICINE

## 2024-06-14 PROCEDURE — 96365 THER/PROPH/DIAG IV INF INIT: CPT

## 2024-06-14 PROCEDURE — 85730 THROMBOPLASTIN TIME PARTIAL: CPT | Performed by: EMERGENCY MEDICINE

## 2024-06-14 PROCEDURE — 82962 GLUCOSE BLOOD TEST: CPT

## 2024-06-14 PROCEDURE — 99285 EMERGENCY DEPT VISIT HI MDM: CPT

## 2024-06-14 PROCEDURE — 94799 UNLISTED PULMONARY SVC/PX: CPT

## 2024-06-14 PROCEDURE — 84484 ASSAY OF TROPONIN QUANT: CPT | Performed by: EMERGENCY MEDICINE

## 2024-06-14 PROCEDURE — 85730 THROMBOPLASTIN TIME PARTIAL: CPT | Performed by: INTERNAL MEDICINE

## 2024-06-14 PROCEDURE — 93010 ELECTROCARDIOGRAM REPORT: CPT

## 2024-06-14 PROCEDURE — 83880 ASSAY OF NATRIURETIC PEPTIDE: CPT | Performed by: EMERGENCY MEDICINE

## 2024-06-14 PROCEDURE — 85025 COMPLETE CBC W/AUTO DIFF WBC: CPT | Performed by: EMERGENCY MEDICINE

## 2024-06-14 PROCEDURE — 96375 TX/PRO/DX INJ NEW DRUG ADDON: CPT

## 2024-06-14 RX ORDER — ACETAMINOPHEN 500 MG
500 TABLET ORAL 2 TIMES DAILY
COMMUNITY

## 2024-06-14 RX ORDER — METOPROLOL SUCCINATE 100 MG/1
100 TABLET, EXTENDED RELEASE ORAL 2 TIMES DAILY
Status: DISCONTINUED | OUTPATIENT
Start: 2024-06-14 | End: 2024-06-20

## 2024-06-14 RX ORDER — GUAIFENESIN AND DEXTROMETHORPHAN HYDROBROMIDE 100; 10 MG/5ML; MG/5ML
5 SOLUTION ORAL EVERY 12 HOURS PRN
COMMUNITY

## 2024-06-14 RX ORDER — ALBUTEROL SULFATE 90 UG/1
1 AEROSOL, METERED RESPIRATORY (INHALATION)
Status: DISCONTINUED | OUTPATIENT
Start: 2024-06-14 | End: 2024-06-20

## 2024-06-14 RX ORDER — NICOTINE POLACRILEX 4 MG
30 LOZENGE BUCCAL
Status: DISCONTINUED | OUTPATIENT
Start: 2024-06-14 | End: 2024-06-20

## 2024-06-14 RX ORDER — HEPARIN SODIUM AND DEXTROSE 10000; 5 [USP'U]/100ML; G/100ML
INJECTION INTRAVENOUS CONTINUOUS
Status: DISCONTINUED | OUTPATIENT
Start: 2024-06-14 | End: 2024-06-20

## 2024-06-14 RX ORDER — CYANOCOBALAMIN 1000 UG/ML
1000 INJECTION, SOLUTION INTRAMUSCULAR; SUBCUTANEOUS
COMMUNITY

## 2024-06-14 RX ORDER — MELATONIN
1000 DAILY
Status: ON HOLD | COMMUNITY
End: 2024-06-14 | Stop reason: CLARIF

## 2024-06-14 RX ORDER — DEXTROSE MONOHYDRATE 25 G/50ML
50 INJECTION, SOLUTION INTRAVENOUS
Status: DISCONTINUED | OUTPATIENT
Start: 2024-06-14 | End: 2024-06-20

## 2024-06-14 RX ORDER — GABAPENTIN 300 MG/1
300 CAPSULE ORAL 2 TIMES DAILY
Status: DISCONTINUED | OUTPATIENT
Start: 2024-06-14 | End: 2024-06-20

## 2024-06-14 RX ORDER — MELATONIN
3 NIGHTLY PRN
Status: DISCONTINUED | OUTPATIENT
Start: 2024-06-14 | End: 2024-06-20

## 2024-06-14 RX ORDER — ENOXAPARIN SODIUM 100 MG/ML
40 INJECTION SUBCUTANEOUS DAILY
Status: CANCELLED | OUTPATIENT
Start: 2024-06-14

## 2024-06-14 RX ORDER — FUROSEMIDE 40 MG/1
40 TABLET ORAL DAILY
Status: ON HOLD | COMMUNITY
End: 2024-06-19

## 2024-06-14 RX ORDER — ACETAMINOPHEN 500 MG
500 TABLET ORAL 2 TIMES DAILY
Status: DISCONTINUED | OUTPATIENT
Start: 2024-06-15 | End: 2024-06-20

## 2024-06-14 RX ORDER — LEVOFLOXACIN 250 MG/1
250 TABLET, FILM COATED ORAL DAILY
COMMUNITY

## 2024-06-14 RX ORDER — BISACODYL 10 MG
10 SUPPOSITORY, RECTAL RECTAL
Status: DISCONTINUED | OUTPATIENT
Start: 2024-06-14 | End: 2024-06-20

## 2024-06-14 RX ORDER — FOLIC ACID 1 MG/1
2 TABLET ORAL DAILY
COMMUNITY

## 2024-06-14 RX ORDER — CYCLOPENTOLATE HYDROCHLORIDE 10 MG/ML
1 SOLUTION/ DROPS OPHTHALMIC 2 TIMES DAILY
Status: DISCONTINUED | OUTPATIENT
Start: 2024-06-14 | End: 2024-06-14

## 2024-06-14 RX ORDER — SENNOSIDES 8.6 MG
17.2 TABLET ORAL NIGHTLY PRN
Status: DISCONTINUED | OUTPATIENT
Start: 2024-06-14 | End: 2024-06-20

## 2024-06-14 RX ORDER — ENEMA 19; 7 G/133ML; G/133ML
1 ENEMA RECTAL ONCE AS NEEDED
Status: DISCONTINUED | OUTPATIENT
Start: 2024-06-14 | End: 2024-06-20

## 2024-06-14 RX ORDER — HEPARIN SODIUM AND DEXTROSE 10000; 5 [USP'U]/100ML; G/100ML
12 INJECTION INTRAVENOUS ONCE
Status: COMPLETED | OUTPATIENT
Start: 2024-06-14 | End: 2024-06-14

## 2024-06-14 RX ORDER — ONDANSETRON 2 MG/ML
4 INJECTION INTRAMUSCULAR; INTRAVENOUS EVERY 6 HOURS PRN
Status: DISCONTINUED | OUTPATIENT
Start: 2024-06-14 | End: 2024-06-20

## 2024-06-14 RX ORDER — ASPIRIN 81 MG/1
81 TABLET ORAL 2 TIMES DAILY
Status: DISCONTINUED | OUTPATIENT
Start: 2024-06-14 | End: 2024-06-20

## 2024-06-14 RX ORDER — MODAFINIL 100 MG/1
100 TABLET ORAL DAILY
Status: DISCONTINUED | OUTPATIENT
Start: 2024-06-15 | End: 2024-06-14

## 2024-06-14 RX ORDER — HEPARIN SODIUM 1000 [USP'U]/ML
60 INJECTION, SOLUTION INTRAVENOUS; SUBCUTANEOUS ONCE
Status: COMPLETED | OUTPATIENT
Start: 2024-06-14 | End: 2024-06-14

## 2024-06-14 RX ORDER — AMLODIPINE BESYLATE 5 MG/1
5 TABLET ORAL 2 TIMES DAILY
Status: DISCONTINUED | OUTPATIENT
Start: 2024-06-14 | End: 2024-06-20

## 2024-06-14 RX ORDER — ACETAMINOPHEN 500 MG
1000 TABLET ORAL 2 TIMES DAILY
COMMUNITY

## 2024-06-14 RX ORDER — FUROSEMIDE 10 MG/ML
40 INJECTION INTRAMUSCULAR; INTRAVENOUS ONCE
Status: COMPLETED | OUTPATIENT
Start: 2024-06-14 | End: 2024-06-14

## 2024-06-14 RX ORDER — LOSARTAN POTASSIUM 100 MG/1
100 TABLET ORAL
Status: DISCONTINUED | OUTPATIENT
Start: 2024-06-15 | End: 2024-06-15

## 2024-06-14 RX ORDER — PANTOPRAZOLE SODIUM 40 MG/1
40 TABLET, DELAYED RELEASE ORAL
Status: DISCONTINUED | OUTPATIENT
Start: 2024-06-15 | End: 2024-06-20

## 2024-06-14 RX ORDER — POLYETHYLENE GLYCOL 3350 17 G/17G
17 POWDER, FOR SOLUTION ORAL DAILY PRN
Status: DISCONTINUED | OUTPATIENT
Start: 2024-06-14 | End: 2024-06-20

## 2024-06-14 RX ORDER — ATORVASTATIN CALCIUM 10 MG/1
10 TABLET, FILM COATED ORAL NIGHTLY
Status: DISCONTINUED | OUTPATIENT
Start: 2024-06-14 | End: 2024-06-20

## 2024-06-14 RX ORDER — ACETAMINOPHEN 500 MG
500 TABLET ORAL EVERY 4 HOURS PRN
Status: DISCONTINUED | OUTPATIENT
Start: 2024-06-14 | End: 2024-06-20

## 2024-06-14 RX ORDER — ACETAMINOPHEN 500 MG
1000 TABLET ORAL 2 TIMES DAILY
Status: DISCONTINUED | OUTPATIENT
Start: 2024-06-14 | End: 2024-06-20

## 2024-06-14 RX ORDER — PANTOPRAZOLE SODIUM 40 MG/1
40 TABLET, DELAYED RELEASE ORAL
COMMUNITY

## 2024-06-14 RX ORDER — FUROSEMIDE 10 MG/ML
40 INJECTION INTRAMUSCULAR; INTRAVENOUS
Status: DISCONTINUED | OUTPATIENT
Start: 2024-06-14 | End: 2024-06-19

## 2024-06-14 RX ORDER — NICOTINE POLACRILEX 4 MG
15 LOZENGE BUCCAL
Status: DISCONTINUED | OUTPATIENT
Start: 2024-06-14 | End: 2024-06-20

## 2024-06-14 RX ORDER — PREDNISOLONE ACETATE 10 MG/ML
1 SUSPENSION/ DROPS OPHTHALMIC
Status: DISCONTINUED | OUTPATIENT
Start: 2024-06-14 | End: 2024-06-18

## 2024-06-14 RX ORDER — LEVOFLOXACIN 250 MG/1
250 TABLET, FILM COATED ORAL DAILY
Status: COMPLETED | OUTPATIENT
Start: 2024-06-15 | End: 2024-06-16

## 2024-06-14 RX ORDER — METOCLOPRAMIDE HYDROCHLORIDE 5 MG/ML
5 INJECTION INTRAMUSCULAR; INTRAVENOUS EVERY 8 HOURS PRN
Status: DISCONTINUED | OUTPATIENT
Start: 2024-06-14 | End: 2024-06-20

## 2024-06-14 NOTE — PROGRESS NOTES
NURSING ADMISSION NOTE      Patient admitted via Cart  Oriented to room.  Safety precautions initiated.  Bed in low position.  Call light in reach.    Admission navigator completed with patient and Sabino Jacome paperwork. Alert and oriented x4. O2 saturations adequate on 3L, martínez. Afib on tele, rates controlled. Purewick in place. Wheelchair bound at baseline. Patient updated on plan of care.    Skin check completed with LAN Masters. Skin intact.

## 2024-06-14 NOTE — ED PROVIDER NOTES
Patient Seen in: [facility] Emergency Department      History     Chief Complaint   Patient presents with    Difficulty Breathing     Stated Complaint: RAMÍREZ    Subjective:   HPI    Patient presents complaining of shortness of breath which has been worsening over the past 4 days.  Patient currently resides at [facility] because of her Parkinson's disease.  Patient was placed on Lasix at the nursing facility with increasing doses over the past several days because her chest x-ray apparently showed heart failure, but this has been ineffective in alleviating her dyspnea.  The patient has had no fever or chills.  She has had a cough.  The patient does have a previous history of pulmonary hypertension, but is not on oxygen normally.  Patient was noted to have saturations at approximately 90% on 4 L.    Objective:   Past Medical History:    C2 cervical fracture (HCC)    C5 vertebral fracture (HCC)    CVA (cerebral infarction)    x2    GERD (gastroesophageal reflux disease)    HTN (hypertension)    SHANA (obstructive sleep apnea)    Osteoarthritis    Overactive bladder    Parkinson's disease (HCC)    Pulmonary hypertension (HCC)    RA (rheumatoid arthritis) (HCC)    Type II or unspecified type diabetes mellitus without mention of complication, not stated as uncontrolled    Unspecified sleep apnea    AHI 78 RDI 80 SaO2 deirdre 85 % CPAP 11  Sleep RX/ now HME    Vitamin D deficiency    Vitamin D deficiency              Past Surgical History:   Procedure Laterality Date    Cholecystectomy      Hernia surgery      Hip replacement surgery Bilateral     Knee replacement surgery Right     Other surgical history      \"Veins removed\"    Removal of heel spur Right     Tonsillectomy                  Social History     Socioeconomic History    Marital status:    Tobacco Use    Smoking status: Never    Smokeless tobacco: Never   Vaping Use    Vaping status: Never Used   Substance and Sexual Activity    Alcohol  use: No     Alcohol/week: 0.0 standard drinks of alcohol    Drug use: No   Social History Narrative     2014    5 kids    No tobacco, EtOH, drugs    Retired               Review of Systems    Positive for stated complaint: RAMÍREZ  Other systems are as noted in HPI.  Constitutional and vital signs reviewed.      All other systems reviewed and negative except as noted above.    Physical Exam     ED Triage Vitals [06/14/24 1139]   /73   Pulse 74   Resp 14   Temp 96.9 °F (36.1 °C)   Temp src Temporal   SpO2 (!) 89 %   O2 Device None (Room air)       Current:/81   Pulse 66   Temp 96.9 °F (36.1 °C) (Temporal)   Resp 25   Ht 157.5 cm (5' 2\")   Wt 83.5 kg   SpO2 97%   BMI 33.65 kg/m²         Physical Exam  Vitals and nursing note reviewed.   Constitutional:       Appearance: She is well-developed.   HENT:      Head: Normocephalic.   Cardiovascular:      Rate and Rhythm: Normal rate and regular rhythm.      Heart sounds: Normal heart sounds. No murmur heard.  Pulmonary:      Effort: Pulmonary effort is normal. No respiratory distress.      Breath sounds: Decreased breath sounds and rales present.      Comments: Diminished breath sounds and Rales noted in both bases.  Abdominal:      General: Bowel sounds are normal.      Palpations: Abdomen is soft.      Tenderness: There is no abdominal tenderness. There is no rebound.   Musculoskeletal:         General: No tenderness. Normal range of motion.      Cervical back: Normal range of motion and neck supple.      Right lower leg: Edema present.      Left lower leg: Edema present.      Comments: Bilateral lower extremity edema noted.   Lymphadenopathy:      Cervical: No cervical adenopathy.   Skin:     General: Skin is warm and dry.      Findings: No rash.   Neurological:      Mental Status: She is alert and oriented to person, place, and time.      Sensory: No sensory deficit.              ED Course     Labs Reviewed   COMP METABOLIC PANEL (14) -  Abnormal; Notable for the following components:       Result Value    Glucose 199 (*)     Calculated Osmolality 298 (*)     eGFR-Cr 57 (*)     ALT 10 (*)     Alkaline Phosphatase 148 (*)     Albumin 3.3 (*)     Globulin  4.7 (*)     A/G Ratio 0.7 (*)     All other components within normal limits   PRO BETA NATRIURETIC PEPTIDE - Abnormal; Notable for the following components:    Pro-Beta Natriuretic Peptide 1,490 (*)     All other components within normal limits   PROTHROMBIN TIME (PT) - Abnormal; Notable for the following components:    PT 16.0 (*)     INR 1.27 (*)     All other components within normal limits   CBC W/ DIFFERENTIAL - Abnormal; Notable for the following components:    RBC 3.36 (*)     HGB 9.1 (*)     HCT 30.2 (*)     MCHC 30.1 (*)     All other components within normal limits   TROPONIN I HIGH SENSITIVITY - Normal   PTT, ACTIVATED - Normal   CBC WITH DIFFERENTIAL WITH PLATELET    Narrative:     The following orders were created for panel order CBC With Differential With Platelet.  Procedure                               Abnormality         Status                     ---------                               -----------         ------                     CBC W/ DIFFERENTIAL[[id number]]          Abnormal            Final result                 Please view results for these tests on the individual orders.   RAINBOW DRAW LAVENDER   RAINBOW DRAW LIGHT GREEN   RAINBOW DRAW BLUE     EKG    Rate, intervals and axes as noted on EKG Report.  Rate: 76  Rhythm: Atrial Fibrillation  Reading: No acute QRS or ST morphology changes when compared to EKG performed January 2022.           ED Course as of 06/14/24 1312  ------------------------------------------------------------  Time: 06/14 1204  Value: Hemoglobin(!): 9.1  Comment: Anemia unchanged from baseline.  ------------------------------------------------------------  Time: 06/14 1249  Value: Troponin I (High Sensitivity): 10  Comment:  (Reviewed)  ------------------------------------------------------------  Time: 06/14 1249  Value: pro-BETA NATRIURETIC PEPTIDE(!): 1,490  Comment: (Reviewed)  ------------------------------------------------------------  Time: 06/14 1250  Value: XR CHEST AP PORTABLE  (CPT=71045)  Comment: Images were independently viewed by me and cardiomegaly was noted with some vascular congestion.     Medications   furosemide (Lasix) 10 mg/mL injection 40 mg (40 mg Intravenous Given 6/14/24 1259)                MDM      Patient comes to the emergency department with increasing shortness of breath over the past several days.  The patient was presumptively treated for heart failure at her her nursing home with oral Lasix, but this was ineffective in reducing the patient's dyspnea.  Patient was evaluated in the emergency department for differentials of acute myocardial ischemia and pneumonia in addition to being evaluated for CHF.  Patient's troponin was negative and chest x-ray did not reveal a consolidative process.  Patient's BNP was elevated and there appeared to be cardiomegaly and vascular congestion on chest x-ray, consistent with heart failure.  Initial dose of IV Lasix was administered and patient was hospitalized under the care of hospitalist service with cardiology consultation.  Patient remained hemodynamically stable and had no acute deterioration of respiratory status here in the emergency department.  Admission disposition: 6/14/2024 12:59 PM                                        Medical Decision Making      Disposition and Plan     Clinical Impression:  1. Acute on chronic congestive heart failure, unspecified heart failure type (HCC)         Disposition:  Admit  6/14/2024 12:59 pm    Follow-up:  No follow-up provider specified.        Medications Prescribed:  Current Discharge Medication List                           Hospital Problems       Present on Admission  Date Reviewed: 12/28/2021            ICD-10-CM  Noted POA    * (Principal) Acute on chronic congestive heart failure, unspecified heart failure type (HCC) I50.9 6/14/2024 Unknown    Anemia D64.9 6/14/2024 Yes    Azotemia R79.89 6/14/2024 Yes

## 2024-06-14 NOTE — ED QUICK NOTES
Orders for admission, patient is aware of plan and ready to go upstairs. Any questions, please call ED RN Binh at extension [phone].     Patient Covid vaccination status: Fully vaccinated     COVID Test Ordered in ED: None    COVID Suspicion at Admission: N/A    Running Infusions:  None    Mental Status/LOC at time of transport: A&Ox4     Other pertinent information: Pt on 2L O2. Pt is on purwick.   CIWA score: N/A   NIH score:  N/A

## 2024-06-14 NOTE — ED INITIAL ASSESSMENT (HPI)
Pt in via EMS from [facility]. Pt c/o difficulty breathing x4 days. They increased her Lasix dose did not help with edema or SOB. Pt was 88% on room. Pt is never on oxygen at home. Pt placed on 4L at 90% and then 6L to get to 96%. Pt A&Ox4.

## 2024-06-14 NOTE — H&P
[facility] Hospitalist History and Physical       ASSESSMENT / PLAN:   Sandi Linder  is a 83 year old female with TDM, parkinsons disease, HTN, HLD, chronic leg edema, SHANA on CPAP, seronegative RA, admitted with SOB.     SOB / Acute CHF  - with elevated BNP, edema, orthopnea, concerning for volume overload   - IV lasix  - cards evaluation  - echo   - tele  - I/O, daily weights  - wean o2 as able.    New Onset Atrial Fibrillation   - cards eval appreciated  - echo ordered and pending  - monitor on telemetry   - continue beta blocker  - check TSH     HTN / HLD  - resume home meds as ordered     SHANA  - CPAP     Seronegative RA  - receives remicade infusions. Follow up with rheum outpatient     Parkinson's Disease  - cont home carbidopa/levadopa    T2DM  - SSI, accucheks     VTE ppx: lovenox    Dispo: Admitted inpatient. ADOD tbd.     PT is DNR/DNI. OK with vasopressors and anti-arrhythmics, and ICU level care of indicated. No CPR, no cardioversion, no intubation. Discussed with patient and with daughters at bedside.     Daughter Liz is POA if needed.     16 mins spent advance care planning face to face.     PCP: Norm Mendoza MD    Concerns regarding plan of care were discussed with patient. Patient agrees with plan as detailed above.       HISTORY:   CC:   Chief Complaint   Patient presents with    Difficulty Breathing        PCP: Norm Mendoza MD    History of Present Illness:   Sandi Linder  is a 83 year old female with TDM, parkinsons disease, HTN, HLD, chronic leg edema, SHANA on CPAP, seronegative RA, admitted with SOB.     She lives at a nursing facility. She is largely wheelchair bound but came transfer. Over the last few day she has become increasingly short of breath. She mainly gets SOB with exertion. No chest pain. She also has orthopnea and says the last couple nights she has been sleeping in a chair. She has chronic leg edema, unclear if getting worse. She takes oral lasix. She denies fevers, chills.  Has occasional cough with whitish sputum.       Objectives    /81   Pulse 66   Temp 96.9 °F (36.1 °C) (Temporal)   Resp 25   Ht 5' 2\" (1.575 m)   Wt 184 lb (83.5 kg)   SpO2 97%   BMI 33.65 kg/m²     Physical Exam  Constitutional:       Appearance: She is not toxic-appearing.   HENT:      Head: Normocephalic.      Mouth/Throat:      Mouth: Mucous membranes are moist.   Eyes:      Extraocular Movements: Extraocular movements intact.      Conjunctiva/sclera: Conjunctivae normal.      Pupils: Pupils are equal, round, and reactive to light.   Cardiovascular:      Rate and Rhythm: Normal rate and regular rhythm.      Heart sounds: No murmur heard.  Pulmonary:      Effort: No respiratory distress.      Breath sounds: Normal breath sounds. No wheezing.   Abdominal:      General: There is no distension.      Palpations: Abdomen is soft.      Tenderness: There is no abdominal tenderness.   Musculoskeletal:      Comments: 1+ bilateral LE edema    Skin:     Findings: No rash.   Neurological:      General: No focal deficit present.      Mental Status: She is alert.      Cranial Nerves: No cranial nerve deficit.   Psychiatric:         Mood and Affect: Mood normal.         PMH  Past Medical History:    C2 cervical fracture (HCC)    C5 vertebral fracture (HCC)    CVA (cerebral infarction)    x2    GERD (gastroesophageal reflux disease)    HTN (hypertension)    SHANA (obstructive sleep apnea)    Osteoarthritis    Overactive bladder    Parkinson's disease (HCC)    Pulmonary hypertension (HCC)    RA (rheumatoid arthritis) (HCC)    Type II or unspecified type diabetes mellitus without mention of complication, not stated as uncontrolled    Unspecified sleep apnea    AHI 78 RDI 80 SaO2 deirdre 85 % CPAP 11  Sleep RX/ now HME    Vitamin D deficiency    Vitamin D deficiency        PSH  Past Surgical History:   Procedure Laterality Date    Cholecystectomy      Hernia surgery      Hip replacement surgery Bilateral     Knee  replacement surgery Right     Other surgical history      \"Veins removed\"    Removal of heel spur Right     Tonsillectomy          ALL:  Allergies   Allergen Reactions    Naproxen OTHER (SEE COMMENTS)     headaches    Adhesive Tape RASH     States makes skin \"raw\"    Chocolate RASH    Strawberries RASH        Home Medications:  [unfilled]     Soc Hx  Social History     Tobacco Use    Smoking status: Never    Smokeless tobacco: Never   Substance Use Topics    Alcohol use: No     Alcohol/week: 0.0 standard drinks of alcohol        Fam Hx  Family History   Problem Relation Age of Onset    Other (Other) Mother         RA    Other (Other) Sister         RA    Other (mitral valve prolapse) Sister     Other (Psoriasis) Brother        Review of Systems  A comprehensive 10 point review of systems was completed.  Pertinent positives and negatives noted in the the HPI.      DIAGNOSTIC DATA:     Recent Results (from the past 24 hour(s))   RAINBOW DRAW LAVENDER    Collection Time: 06/14/24 11:48 AM   Result Value Ref Range    Hold Lavender Auto Resulted    RAINBOW DRAW LIGHT GREEN    Collection Time: 06/14/24 11:48 AM   Result Value Ref Range    Hold Lt Green Auto Resulted    Comp Metabolic Panel (14)    Collection Time: 06/14/24 11:48 AM   Result Value Ref Range    Glucose 199 (H) 70 - 99 mg/dL    Sodium 140 136 - 145 mmol/L    Potassium 4.6 3.5 - 5.1 mmol/L    Chloride 108 98 - 112 mmol/L    CO2 26.0 21.0 - 32.0 mmol/L    Anion Gap 6 0 - 18 mmol/L    BUN 20 9 - 23 mg/dL    Creatinine 0.97 0.55 - 1.02 mg/dL    Calcium, Total 9.2 8.5 - 10.1 mg/dL    Calculated Osmolality 298 (H) 275 - 295 mOsm/kg    eGFR-Cr 57 (L) >=60 mL/min/1.73m2    AST 33 15 - 37 U/L    ALT 10 (L) 13 - 56 U/L    Alkaline Phosphatase 148 (H) 55 - 142 U/L    Bilirubin, Total 0.7 0.1 - 2.0 mg/dL    Total Protein 8.0 6.4 - 8.2 g/dL    Albumin 3.3 (L) 3.4 - 5.0 g/dL    Globulin  4.7 (H) 2.8 - 4.4 g/dL    A/G Ratio 0.7 (L) 1.0 - 2.0   Troponin I (High  Sensitivity)    Collection Time: 06/14/24 11:48 AM   Result Value Ref Range    Troponin I (High Sensitivity) 10 <=54 ng/L   Pro Beta Natriuretic Peptide    Collection Time: 06/14/24 11:48 AM   Result Value Ref Range    Pro-Beta Natriuretic Peptide 1,490 (H) <450 pg/mL   CBC W/ DIFFERENTIAL    Collection Time: 06/14/24 11:48 AM   Result Value Ref Range    WBC 8.8 4.0 - 11.0 x10(3) uL    RBC 3.36 (L) 3.80 - 5.30 x10(6)uL    HGB 9.1 (L) 12.0 - 16.0 g/dL    HCT 30.2 (L) 35.0 - 48.0 %    .0 150.0 - 450.0 10(3)uL    MCV 89.9 80.0 - 100.0 fL    MCH 27.1 26.0 - 34.0 pg    MCHC 30.1 (L) 31.0 - 37.0 g/dL    RDW 16.2 %    Neutrophil Absolute Prelim 5.68 1.50 - 7.70 x10 (3) uL    Neutrophil Absolute 5.68 1.50 - 7.70 x10(3) uL    Lymphocyte Absolute 2.02 1.00 - 4.00 x10(3) uL    Monocyte Absolute 0.85 0.10 - 1.00 x10(3) uL    Eosinophil Absolute 0.13 0.00 - 0.70 x10(3) uL    Basophil Absolute 0.03 0.00 - 0.20 x10(3) uL    Immature Granulocyte Absolute 0.04 0.00 - 1.00 x10(3) uL    Neutrophil % 64.9 %    Lymphocyte % 23.1 %    Monocyte % 9.7 %    Eosinophil % 1.5 %    Basophil % 0.3 %    Immature Granulocyte % 0.5 %   RAINBOW DRAW BLUE    Collection Time: 06/14/24 11:49 AM   Result Value Ref Range    Hold Blue Auto Resulted    Prothrombin Time (PT)    Collection Time: 06/14/24 11:49 AM   Result Value Ref Range    PT 16.0 (H) 11.6 - 14.8 seconds    INR 1.27 (H) 0.80 - 1.20   PTT, Activated    Collection Time: 06/14/24 11:49 AM   Result Value Ref Range    PTT 34.9 23.0 - 36.0 seconds   EKG 12 Lead    Collection Time: 06/14/24 11:49 AM   Result Value Ref Range    Ventricular rate 76 BPM    Atrial rate  BPM    P-R Interval  ms    QRS Duration 86 ms    Q-T Interval 400 ms    QTC Calculation (Bezet) 450 ms    P Axis  degrees    R Axis 14 degrees    T Axis -3 degrees       Additional Diagnostics:   ECG:     Radiology: [unfilled]

## 2024-06-14 NOTE — CONSULTS
[facility] Cardiology  Consultation Note      Sandi Linder Patient Status:  Emergency    1940 MRN [id number]   Location [facility] EMERGENCY DEPARTMENT Attending Lon Bliss MD   Hosp Day # 0 PCP Norm Mendoza MD     Reason for consult: CHF    History of Present Illness:  Sandi Linder is a 83 year old female who presented to [facility] on 2024 with several days of worsening SOB and orthopnea. Has chronic LE edema on oral lasix at home. No CP. Here in new Afib, rate controlled. Reports mild palpitations for months. No outward s/o bleeding. Feels very unsteady on feet. She is mostly in wheelchair, but gets up to the bathroom on her own and frequently falls with head strike, last one was less than 1 week ago.     Medications:  Current Facility-Administered Medications   Medication Dose Route Frequency    heparin (Porcine) 1000 UNIT/ML injection - BOLUS IV 5,000 Units  60 Units/kg Intravenous Once    heparin (Porcine) 81450 units/250 mL infusion (ACS/AFIB) INITIAL DOSE  12 Units/kg/hr Intravenous Once    heparin (Porcine) 12626 units/250mL infusion ACS/AFIB CONTINUOUS  200-3,000 Units/hr Intravenous Continuous    furosemide (Lasix) 10 mg/mL injection 40 mg  40 mg Intravenous BID (Diuretic)       Past Medical History:    C2 cervical fracture (HCC)    C5 vertebral fracture (HCC)    CVA (cerebral infarction)    x2    GERD (gastroesophageal reflux disease)    HTN (hypertension)    SHANA (obstructive sleep apnea)    Osteoarthritis    Overactive bladder    Parkinson's disease (HCC)    Pulmonary hypertension (HCC)    RA (rheumatoid arthritis) (HCC)    Type II or unspecified type diabetes mellitus without mention of complication, not stated as uncontrolled    Unspecified sleep apnea    AHI 78 RDI 80 SaO2 deirdre 85 % CPAP 11  Sleep RX/ now HME    Vitamin D deficiency    Vitamin D deficiency       Past Surgical History:   Procedure Laterality Date    Cholecystectomy      Hernia surgery      Hip  replacement surgery Bilateral     Knee replacement surgery Right     Other surgical history      \"Veins removed\"    Removal of heel spur Right     Tonsillectomy         Family History  family history includes Other in her mother and sister; Psoriasis in her brother; mitral valve prolapse in her sister.    Social History   reports that she has never smoked. She has never used smokeless tobacco. She reports that she does not drink alcohol and does not use drugs.     Allergies  Allergies   Allergen Reactions    Naproxen OTHER (SEE COMMENTS)     headaches    Adhesive Tape RASH     States makes skin \"raw\"    Chocolate RASH    Strawberries RASH       Review of Systems:  As per HPI, otherwise 10 point ROS is negative in detail.    Physical Exam:  Blood pressure 140/65, pulse 67, temperature 96.9 °F (36.1 °C), temperature source Temporal, resp. rate 18, height 62\", weight 184 lb (83.5 kg), SpO2 95%, not currently breastfeeding.  Temp (24hrs), Av.9 °F (36.1 °C), Min:96.9 °F (36.1 °C), Max:96.9 °F (36.1 °C)    Wt Readings from Last 3 Encounters:   24 184 lb (83.5 kg)   08/10/23 186 lb (84.4 kg)   23 186 lb (84.4 kg)       General: Awake and alert; in no acute distress  HEENT: Extraocular movements are intact; sclerae are anicteric; scalp is atraumatic  Neck: Supple; no JVD; no carotid bruits  Cardiac: Irreg ireg, variable S1, nl S2, no murmurs, rubs, or gallops are appreciated  Lungs: Bibasilar rales  Abdomen: Soft, non-distended, non-tender; bowel sounds are normoactive  Extremities: Warm, 1-2+ pitting edema b/l LE  Psychiatric: Normal mood and affect; answers questions appropriately  Dermatologic: No rashes; normal skin turgor    Diagnostic testing:    Labs:   Lab Results   Component Value Date    INR 1.27 (H) 2024    INR 1.05 2022        Lab Results   Component Value Date    WBC 8.8 2024    HGB 9.1 2024    HCT 30.2 2024    .0 2024    CREATSERUM 0.97 2024     BUN 20 06/14/2024     06/14/2024    K 4.6 06/14/2024     06/14/2024    CO2 26.0 06/14/2024     06/14/2024    CA 9.2 06/14/2024    ALB 3.3 06/14/2024    ALKPHO 148 06/14/2024    BILT 0.7 06/14/2024    TP 8.0 06/14/2024    AST 33 06/14/2024    ALT 10 06/14/2024    PTT 34.9 06/14/2024    INR 1.27 06/14/2024    PTP 16.0 06/14/2024       Cardiac diagnostics:    CXR 6/14/2024   Mild cardiomegaly with subsegmental atelectasis at the lung bases.     EKG 6/14/2024:   Atrial fibrillation   Low voltage QRS, consider pulmonary disease, pericardial effusion, or normal variant   Septal infarct (cited on or before 14-JUN-2024)   Abnormal ECG   76 bpm    Echo 5/19/15:  1.  Normal left ventricular size and systolic function.  2.  Mild left atrium enlargement.  3.  Physiologic valvular regurgitation.  4.  Mild pulmonary hypertension.  5.  Diastolic dysfunction.    Impression:  83 year old female presenting with SOB, orthopnea, edema consistent with acute HF    Acute HF  Hypoxemic resp failure - 2L O2  Afib - new diagnosis  CHADS VASC ~ 6  HTN - borderline  HLD   SHANA - CPAP  DM2  Parkinson  Frequent falls - gait instability     Recommendations:  IV lasix, already feeling improvement since first dose. Takes 40mg PO daily at home, may need higher daily maintenance dose.   Check echo.   IV heparin for now. She is high stroke risk thus OAC would be indicated, however patient and family are concerned about frequent falls with recurrent head strike. Suspect gait instability due to Parkinson disease and deconditioning. Will need to decide if she is safe enough to take DOAC at discharge and to consider LAAO in the future.    Continue Toprol 100mg BID  Holding home losartan, once adequately diuresed, consider changing this to Entresto    Thank you for allowing our practice to participate in the care of your patient. Please do not hesitate to contact me if you have any questions.    Compa Pablo MD  Interventional  Cardiology  [facility]  Office: [phone]    6/14/2024  3:17 PM    Total encounter time 75 minutes.

## 2024-06-15 ENCOUNTER — APPOINTMENT (OUTPATIENT)
Dept: CV DIAGNOSTICS | Facility: HOSPITAL | Age: 84
DRG: 291 | End: 2024-06-15
Attending: INTERNAL MEDICINE

## 2024-06-15 LAB
ANION GAP SERPL CALC-SCNC: 4 MMOL/L (ref 0–18)
APTT PPP: 70 SECONDS (ref 23–36)
BUN BLD-MCNC: 18 MG/DL (ref 9–23)
CALCIUM BLD-MCNC: 9.3 MG/DL (ref 8.5–10.1)
CHLORIDE SERPL-SCNC: 105 MMOL/L (ref 98–112)
CO2 SERPL-SCNC: 32 MMOL/L (ref 21–32)
CREAT BLD-MCNC: 0.78 MG/DL
EGFRCR SERPLBLD CKD-EPI 2021: 74 ML/MIN/1.73M2 (ref 60–?)
ERYTHROCYTE [DISTWIDTH] IN BLOOD BY AUTOMATED COUNT: 16.3 %
GLUCOSE BLD-MCNC: 110 MG/DL (ref 70–99)
GLUCOSE BLD-MCNC: 122 MG/DL (ref 70–99)
GLUCOSE BLD-MCNC: 131 MG/DL (ref 70–99)
GLUCOSE BLD-MCNC: 161 MG/DL (ref 70–99)
GLUCOSE BLD-MCNC: 93 MG/DL (ref 70–99)
HCT VFR BLD AUTO: 28.6 %
HGB BLD-MCNC: 8.6 G/DL
MAGNESIUM SERPL-MCNC: 1.3 MG/DL (ref 1.6–2.6)
MCH RBC QN AUTO: 27.3 PG (ref 26–34)
MCHC RBC AUTO-ENTMCNC: 30.1 G/DL (ref 31–37)
MCV RBC AUTO: 90.8 FL
OSMOLALITY SERPL CALC.SUM OF ELEC: 295 MOSM/KG (ref 275–295)
PLATELET # BLD AUTO: 274 10(3)UL (ref 150–450)
PLATELET # BLD AUTO: 274 10(3)UL (ref 150–450)
POTASSIUM SERPL-SCNC: 3.2 MMOL/L (ref 3.5–5.1)
POTASSIUM SERPL-SCNC: 3.8 MMOL/L (ref 3.5–5.1)
RBC # BLD AUTO: 3.15 X10(6)UL
SODIUM SERPL-SCNC: 141 MMOL/L (ref 136–145)
WBC # BLD AUTO: 8.2 X10(3) UL (ref 4–11)

## 2024-06-15 PROCEDURE — 84132 ASSAY OF SERUM POTASSIUM: CPT | Performed by: HOSPITALIST

## 2024-06-15 PROCEDURE — 92610 EVALUATE SWALLOWING FUNCTION: CPT

## 2024-06-15 PROCEDURE — 85730 THROMBOPLASTIN TIME PARTIAL: CPT | Performed by: INTERNAL MEDICINE

## 2024-06-15 PROCEDURE — 93306 TTE W/DOPPLER COMPLETE: CPT | Performed by: INTERNAL MEDICINE

## 2024-06-15 PROCEDURE — 80048 BASIC METABOLIC PNL TOTAL CA: CPT | Performed by: EMERGENCY MEDICINE

## 2024-06-15 PROCEDURE — 83735 ASSAY OF MAGNESIUM: CPT | Performed by: INTERNAL MEDICINE

## 2024-06-15 PROCEDURE — 85027 COMPLETE CBC AUTOMATED: CPT | Performed by: EMERGENCY MEDICINE

## 2024-06-15 PROCEDURE — 82962 GLUCOSE BLOOD TEST: CPT

## 2024-06-15 PROCEDURE — 85049 AUTOMATED PLATELET COUNT: CPT | Performed by: INTERNAL MEDICINE

## 2024-06-15 PROCEDURE — 94640 AIRWAY INHALATION TREATMENT: CPT

## 2024-06-15 PROCEDURE — 97161 PT EVAL LOW COMPLEX 20 MIN: CPT

## 2024-06-15 PROCEDURE — 97530 THERAPEUTIC ACTIVITIES: CPT

## 2024-06-15 RX ORDER — NYSTATIN 100000 U/G
CREAM TOPICAL 2 TIMES DAILY
Status: DISCONTINUED | OUTPATIENT
Start: 2024-06-15 | End: 2024-06-20

## 2024-06-15 RX ORDER — MAGNESIUM OXIDE 400 MG/1
800 TABLET ORAL ONCE
Status: COMPLETED | OUTPATIENT
Start: 2024-06-15 | End: 2024-06-15

## 2024-06-15 RX ORDER — MAGNESIUM OXIDE 400 MG/1
800 TABLET ORAL ONCE
Status: DISCONTINUED | OUTPATIENT
Start: 2024-06-15 | End: 2024-06-15

## 2024-06-15 RX ORDER — POTASSIUM CHLORIDE 20 MEQ/1
40 TABLET, EXTENDED RELEASE ORAL EVERY 4 HOURS
Status: COMPLETED | OUTPATIENT
Start: 2024-06-15 | End: 2024-06-15

## 2024-06-15 NOTE — SLP NOTE
ADULT SWALLOWING EVALUATION    ASSESSMENT    ASSESSMENT/OVERALL IMPRESSION:  Pt seen at bedside this AM for swallow evaluation. Speech consulted due to choking episode reported by RN at lunch with meatloaf. Pt admitted to hospital due to increased WOB. Pt diagnosed with CHF. Pt's medical history significant for PD. Pt cooperative, pleasant, and alert. Pt's daughter and son-in-law present at bedside. Pt self reports no history of dysphagia and denied symptoms associated with dysphagia prior to episode. Pt recalled episode as an accidental miss swallow. Pt reported she felt she was reclined and felt chest congestion impacted swallow.     Volitional cough and swallow present and strong. Trial thin liquids via cup, self presented, with no overt s/s of aspiration. Trial hard solids with no overt s/s of aspiration. Functional mastication with no oral residue observed. Pt independently asked for liquid wash to assist with swallow of dry hard solid.     Recommend regular diet and thin liquids. Given functional swallow response demonstrated at bedside and current medical workup, suggest utilize compensatory strategies to maximize swallow given pt on higher oxygen requirements from baseline, especially focused on upright positioning and slow rate of intake. Pt would benefit from extra gravy/sauces on dry solids.     Further recommend meal follow up to ensure safety with diet and educate pt/family/caregivers on compensatory strategies and swallow precautions. Video swallow study to be completed if CXR declines, increase in clinical signs of aspiration, and/or MD desires.    Educated pt on natural progression of PD and impact on swallow and communication; pt verbalized understanding.    RECOMMENDATIONS   Diet Recommendations - Solids: Regular  Diet Recommendations - Liquids: Thin Liquids      Compensatory Strategies Recommended: No straws;Slow rate;Small bites and sips;Multiple swallows;Extra sauce/gravy (Slow rate of  intake)  Aspiration Precautions: Upright position;Slow rate;Small bites and sips;No straw  Medication Administration Recommendations: No restrictions  Treatment Plan/Recommendations: Dysphagia therapy    HISTORY   MEDICAL HISTORY  Reason for Referral: R/O aspiration    Problem List  Principal Problem:    Acute on chronic congestive heart failure, unspecified heart failure type ([facility])  Active Problems:    Anemia    Azotemia      Past Medical History  Past Medical History:    C2 cervical fracture (HCC)    C5 vertebral fracture ([facility])    CVA (cerebral infarction)    x2    GERD (gastroesophageal reflux disease)    HTN (hypertension)    SHANA (obstructive sleep apnea)    Osteoarthritis    Overactive bladder    Parkinson's disease (HCC)    Pulmonary hypertension (HCC)    RA (rheumatoid arthritis) ([facility])    Type II or unspecified type diabetes mellitus without mention of complication, not stated as uncontrolled    Unspecified sleep apnea    AHI 78 RDI 80 SaO2 deirdre 85 % CPAP 11  Sleep RX/ now HME    Vitamin D deficiency    Vitamin D deficiency       Prior Living Situation: Home with support  Diet Prior to Admission: Regular;Thin liquids  Precautions: Hard of hearing    Patient/Family Goals: To eat a steak    SWALLOWING HISTORY  Current Diet Consistency: NPO  Dysphagia History: None  Imaging Results: CXR 6/14/24:     Impression   CONCLUSION:  Mild cardiomegaly with subsegmental atelectasis at the lung bases.       OBJECTIVE   ORAL MOTOR EXAMINATION  Dentition: Upper dentures;Lower dentures;Functional  Symmetry: Within Functional Limits  Strength: Within Functional Limits  Tone: Within Functional Limits  Range of Motion: Within Functional Limits  Rate of Motion: Within Functional Limits    Voice Quality: Clear  Respiratory Status: Supplemental O2;Nasal cannula;Unlabored  Consistencies Trialed: Thin liquids;Hard solid  Method of Presentation: Self presentation;Cup;Consecutive swallows;Single sips  Patient Positioning:  Upright    Oral Phase of Swallow: Within Functional Limits      Pharyngeal Phase of Swallow: Within Functional Limits    (Please note: Silent aspiration cannot be evaluated clinically. Videofluoroscopic Swallow Study is required to rule-out silent aspiration.)    Esophageal Phase of Swallow: No complaints consistent with possible esophageal involvement          GOALS  Goal #1 The patient will tolerate regular consistency and thin liquids without overt signs or symptoms of aspiration with 100 % accuracy over 1 session(s).  Not Addressed   Goal #2 The patient/family/caregiver will demonstrate understanding and implementation of aspiration precautions and swallow strategies independently over 1 session(s).    Not Addressed     FOLLOW UP  Treatment Plan/Recommendations: Dysphagia therapy  Number of Visits to Meet Established Goals: 1  Follow Up Needed (Documentation Required): Yes  SLP Follow-up Date: 06/17/24    Thank you for your referral.   If you have any questions, please contact KOREY Garrett

## 2024-06-15 NOTE — PHYSICAL THERAPY NOTE
PHYSICAL THERAPY EVALUATION - INPATIENT     Room Number: 2626/2626-A  Evaluation Date: 6/15/2024  Type of Evaluation: Initial  Physician Order: PT Eval and Treat    Presenting Problem: incr SOB/ RAMÍREZ  Co-Morbidities : TDM, parkinsons disease, HTN, HLD, chronic leg edema, SHANA on CPAP, seronegative RA  Reason for Therapy: Mobility Dysfunction and Discharge Planning    PHYSICAL THERAPY ASSESSMENT   Patient is a 83 year old female admitted 6/14/2024 for incr SOB/RAMÍREZ, diagnosed with acute on chronic congestive heart failure.   Patient is currently functioning near baseline with bed mobility and transfers. Prior to admission, patient's baseline is indep with bed mobility and transfers, non ambulatory at baseline..     Patient will benefit from continued skilled PT Services at discharge to promote functional independence and safety with additional support and return home with home health PT.    PLAN  Patient has been evaluated and presents with no skilled Physical Therapy needs at this time.  Patient discharged from Physical Therapy services.  Please re-order if a new functional limitation presents during this admission.    GOALS  Patient was able to achieve the following goals ...    Patient was able to transfer At previous, functional level  Safely and independently   Patient able to ambulate on level surfaces Unable before admission     HOME SITUATION  Type of Home: Assisted living facility ([facility])   Home Layout: One level                Lives With: Staff 24 hours  Drives: No  Patient Owned Equipment: Wheelchair;Rolling walker (adjustable bed)  Patient Regularly Uses: None    Prior Level of [place]: Per pt lives at [facility]. Completes bed mobility and stand pivot transfers from bed<>wheelchair<>recliner<>toilet indep. Hasn't taken steps in a couple months now. Had a fall the other day while transferring from toilet to wheelchair- said there was a crack and fell between it. Has adjustable bed. Has  supervision assist for showers, and can call someone if she needs help with dressing. Working with PT on standing tolerance and performing functional activities in standing. Has chronic body pain.    SUBJECTIVE  \"I'm marvelous.\"     OBJECTIVE  Precautions: Bed/chair alarm;Hard of hearing  Fall Risk: High fall risk    WEIGHT BEARING RESTRICTION  Weight Bearing Restriction: None                PAIN ASSESSMENT  Rating: Unable to rate  Location: low back pain  Management Techniques: Activity promotion;Body mechanics;Repositioning    COGNITION  Overall Cognitive Status:  WFL - within functional limits    RANGE OF MOTION AND STRENGTH ASSESSMENT  Upper extremity ROM and strength are within functional limits   Lower extremity ROM is within functional limits   Lower extremity strength is within functional limits     BALANCE  Static Sitting: Good  Dynamic Sitting: Fair +  Static Standing: Fair  Dynamic Standing: Fair -    ADDITIONAL TESTS                                    ACTIVITY TOLERANCE                         O2 WALK  Oxygen Therapy  SPO2% on Oxygen at Rest: 93  At rest oxygen flow (liters per minute): 2    NEUROLOGICAL FINDINGS                        AM-PAC '6-Clicks' INPATIENT SHORT FORM - BASIC MOBILITY  How much difficulty does the patient currently have...  Patient Difficulty: Turning over in bed (including adjusting bedclothes, sheets and blankets)?: A Little   Patient Difficulty: Sitting down on and standing up from a chair with arms (e.g., wheelchair, bedside commode, etc.): A Little   Patient Difficulty: Moving from lying on back to sitting on the side of the bed?: A Little   How much help from another person does the patient currently need...   Help from Another: Moving to and from a bed to a chair (including a wheelchair)?: A Little   Help from Another: Need to walk in hospital room?: A Lot   Help from Another: Climbing 3-5 steps with a railing?: Total       AM-PAC Score:  Raw Score: 15   Approx Degree of  Impairment: 57.7%   Standardized Score (AM-PAC Scale): 39.45   CMS Modifier (G-Code): CK    FUNCTIONAL ABILITY STATUS  Gait Assessment   Functional Mobility/Gait Assessment  Gait Assistance: Not tested    Skilled Therapy Provided     Bed Mobility:  Rolling: NT  Supine to sit: w/ HOB elevated pt used rails of bed to assist - supervision w/ incr time   Sit to supine: NT     Transfer Mobility:  Sit to stand: pt reached for arm rest of bedside chair to assist with stand pivot transfer   Stand to sit: supervision  Gait = non-amb at baseline    Therapist's comments:  Patient presents sitting up in bed. Discussed role and goal of physical therapy in hospital setting. Pt in agreement to session. Pt received on 2LO2- sats >90% throughout entirety of session. Educated on deep breathing techniques as well (pt is a mouth breather).   Bed mobility w/ HOB elevated at supervision, incr time. Sit to stand at supervision, pt reached across for bedside chair arm rest to assist with transfer. Upright in chair at end of session. Discussed importance of continued out of bed mobility. Pt verbalizes understanding.     Exercise/Education Provided:  Bed mobility  Body mechanics  Energy conservation  Functional activity tolerated  Posture  Transfer training    Patient End of Session: Up in chair;Needs met;Call light within reach;RN aware of session/findings;All patient questions and concerns addressed;Alarm set;Discussed recommendations with /    Patient Evaluation Complexity Level:  History Moderate - 1 or 2 personal factors and/or co-morbidities   Examination of body systems Low - addressing 1-2 elements   Clinical Presentation Low - Stable   Clinical Decision Making Low Complexity     PT Session Time: 30 minutes  Therapeutic Activity: 15 minutes

## 2024-06-15 NOTE — PLAN OF CARE
Alert and oriented x4. O2on 3L martínez. Afib on tele, rates controlled. Heparin gtt infusing as as ordered.  IV Lasix given with good output. Purewick in place. Wheelchair bound at baseline. Patient updated on plan of care. Fall/safety precautions instructed , placed call light w/in reach.    2300   pt. keep desating to low 80's , increase O2 to 5 li  , still O2 sat at low 80's. Pt. is a mouth breather and pt. refused CPAP. Applied simple mask at 3 li , O2 sat now at mid 90's.     Problem: CARDIOVASCULAR - ADULT  Goal: Maintains optimal cardiac output and hemodynamic stability  Description: INTERVENTIONS:  - Monitor vital signs, rhythm, and trends  - Monitor for bleeding, hypotension and signs of decreased cardiac output  - Evaluate effectiveness of vasoactive medications to optimize hemodynamic stability  - Monitor arterial and/or venous puncture sites for bleeding and/or hematoma  - Assess quality of pulses, skin color and temperature  - Assess for signs of decreased coronary artery perfusion - ex. Angina  - Evaluate fluid balance, assess for edema, trend weights  Outcome: Progressing  Goal: Absence of cardiac arrhythmias or at baseline  Description: INTERVENTIONS:  - Continuous cardiac monitoring, monitor vital signs, obtain 12 lead EKG if indicated  - Evaluate effectiveness of antiarrhythmic and heart rate control medications as ordered  - Initiate emergency measures for life threatening arrhythmias  - Monitor electrolytes and administer replacement therapy as ordered  Outcome: Progressing     Problem: RESPIRATORY - ADULT  Goal: Achieves optimal ventilation and oxygenation  Description: INTERVENTIONS:  - Assess for changes in respiratory status  - Assess for changes in mentation and behavior  - Position to facilitate oxygenation and minimize respiratory effort  - Oxygen supplementation based on oxygen saturation or ABGs  - Provide Smoking Cessation handout, if applicable  - Encourage broncho-pulmonary hygiene  including cough, deep breathe, Incentive Spirometry  - Assess the need for suctioning and perform as needed  - Assess and instruct to report SOB or any respiratory difficulty  - Respiratory Therapy support as indicated  - Manage/alleviate anxiety  - Monitor for signs/symptoms of CO2 retention  Outcome: Progressing

## 2024-06-15 NOTE — PROGRESS NOTES
06/14/24 1039   BiPAP   $ RT Standby Charge (per 15 min) 1  (will perform in morning, refused)   $ SHANA Follow up charge Yes   BiPAP/CPAP Monitored Parameters   Toleration Refused     Patient refused CPAP. Will perform IS in morning.

## 2024-06-15 NOTE — PROGRESS NOTES
[facility] Cardiology  Progress Note      Sandi Linder Patient Status:  Emergency    1940 MRN [id number]   Location [facility] EMERGENCY DEPARTMENT Attending Lon Bliss MD   Hosp Day # 1 PCP Norm Mendoza MD     Subjective: Tired, no complaints of pain      History of Present Illness:  Sandi Linder is a 83 year old female who presented to [facility] on 2024 with several days of worsening SOB and orthopnea. Has chronic LE edema on oral lasix at home. No CP. Here in new Afib, rate controlled. Reports mild palpitations for months. No outward s/o bleeding. Feels very unsteady on feet. She is mostly in wheelchair, but gets up to the bathroom on her own and frequently falls with head strike, last one was less than 1 week ago.     Medications:  Current Facility-Administered Medications   Medication Dose Route Frequency    potassium chloride (Klor-Con M20) tab 40 mEq  40 mEq Oral Q4H    magnesium oxide (Mag-Ox) tab 800 mg  800 mg Oral Once    acetaminophen (Tylenol Extra Strength) tab 500 mg  500 mg Oral Q4H PRN    melatonin tab 3 mg  3 mg Oral Nightly PRN    polyethylene glycol (PEG 3350) (Miralax) 17 g oral packet 17 g  17 g Oral Daily PRN    sennosides (Senokot) tab 17.2 mg  17.2 mg Oral Nightly PRN    bisacodyl (Dulcolax) 10 MG rectal suppository 10 mg  10 mg Rectal Daily PRN    fleet enema (Fleet) 7-19 GM/118ML rectal enema 133 mL  1 enema Rectal Once PRN    ondansetron (Zofran) 4 MG/2ML injection 4 mg  4 mg Intravenous Q6H PRN    metoclopramide (Reglan) 5 mg/mL injection 5 mg  5 mg Intravenous Q8H PRN    glucose (Dex4) 15 GM/59ML oral liquid 15 g  15 g Oral Q15 Min PRN    Or    glucose (Glutose) 40% oral gel 15 g  15 g Oral Q15 Min PRN    Or    glucose-vitamin C (Dex-4) chewable tab 4 tablet  4 tablet Oral Q15 Min PRN    Or    dextrose 50% injection 50 mL  50 mL Intravenous Q15 Min PRN    Or    glucose (Dex4) 15 GM/59ML oral liquid 30 g  30 g Oral Q15 Min PRN    Or    glucose  (Glutose) 40% oral gel 30 g  30 g Oral Q15 Min PRN    Or    glucose-vitamin C (Dex-4) chewable tab 8 tablet  8 tablet Oral Q15 Min PRN    insulin aspart (NovoLOG) 100 Units/mL FlexPen 1-5 Units  1-5 Units Subcutaneous TID AC and HS    heparin (Porcine) 44515 units/250mL infusion ACS/AFIB CONTINUOUS  200-3,000 Units/hr Intravenous Continuous    furosemide (Lasix) 10 mg/mL injection 40 mg  40 mg Intravenous BID (Diuretic)    amLODIPine (Norvasc) tab 5 mg  5 mg Oral BID    aspirin DR tab 81 mg  81 mg Oral BID    carbidopa-levodopa (SINEMET)  MG per tab 1 tablet  1 tablet Oral TID    gabapentin (Neurontin) cap 300 mg  300 mg Oral BID    umeclidinium bromide (Incruse Ellipta) 62.5 MCG/ACT inhaler 1 puff  1 puff Inhalation Daily    albuterol (Ventolin HFA) 108 (90 Base) MCG/ACT inhaler 1 puff  1 puff Inhalation QID    losartan (Cozaar) tab 100 mg  100 mg Oral Daily    metoprolol succinate ER (Toprol XL) 24 hr tab 100 mg  100 mg Oral BID    pantoprazole (Protonix) DR tab 40 mg  40 mg Oral QAM AC    sertraline (Zoloft) tab 50 mg  50 mg Oral Daily    atorvastatin (Lipitor) tab 10 mg  10 mg Oral Nightly    prednisoLONE (Pred Forte) 1 % ophthalmic suspension 1 drop  1 drop Left Eye TID & HS    acetaminophen (Tylenol Extra Strength) tab 500 mg  500 mg Oral BID    acetaminophen (Tylenol Extra Strength) tab 1,000 mg  1,000 mg Oral BID    levoFLOXacin (Levaquin) tab 250 mg  250 mg Oral Daily       Past Medical History:    C2 cervical fracture (HCC)    C5 vertebral fracture (HCC)    CVA (cerebral infarction)    x2    GERD (gastroesophageal reflux disease)    HTN (hypertension)    SHANA (obstructive sleep apnea)    Osteoarthritis    Overactive bladder    Parkinson's disease (HCC)    Pulmonary hypertension (HCC)    RA (rheumatoid arthritis) (HCC)    Type II or unspecified type diabetes mellitus without mention of complication, not stated as uncontrolled    Unspecified sleep apnea    AHI 78 RDI 80 SaO2 deirdre 85 % CPAP 11  Sleep  RX/ now HME    Vitamin D deficiency    Vitamin D deficiency       Past Surgical History:   Procedure Laterality Date    Cholecystectomy      Hernia surgery      Hip replacement surgery Bilateral     Knee replacement surgery Right     Other surgical history      \"Veins removed\"    Removal of heel spur Right     Tonsillectomy         Family History  family history includes Other in her mother and sister; Psoriasis in her brother; mitral valve prolapse in her sister.    Social History   reports that she has never smoked. She has never used smokeless tobacco. She reports that she does not drink alcohol and does not use drugs.     Allergies  Allergies   Allergen Reactions    Naproxen OTHER (SEE COMMENTS)     headaches    Adhesive Tape RASH     States makes skin \"raw\"    Chocolate RASH    Strawberries RASH       Review of Systems:  As per HPI, otherwise 10 point ROS is negative in detail.    Physical Exam:  Blood pressure 92/64, pulse 91, temperature 99.7 °F (37.6 °C), temperature source Oral, resp. rate 23, height 62\", weight 191 lb 6.4 oz (86.8 kg), SpO2 94%, not currently breastfeeding.  Temp (24hrs), Av °F (36.7 °C), Min:96.9 °F (36.1 °C), Max:99.7 °F (37.6 °C)    Wt Readings from Last 3 Encounters:   24 191 lb 6.4 oz (86.8 kg)   08/10/23 186 lb (84.4 kg)   23 186 lb (84.4 kg)       General: Awake and alert; in no acute distress  HEENT: Extraocular movements are intact; sclerae are anicteric; scalp is atraumatic  Neck: Supple; no JVD; no carotid bruits  Cardiac: Irreg ireg, variable S1, nl S2, no murmurs, rubs, or gallops are appreciated  Lungs: Bibasilar rales  Abdomen: Soft, non-distended, non-tender; bowel sounds are normoactive  Extremities: Warm, 1-2+ pitting edema b/l LE  Psychiatric: Normal mood and affect; answers questions appropriately  Dermatologic: No rashes; normal skin turgor    Diagnostic testing:    Labs:   Lab Results   Component Value Date    INR 1.27 (H) 2024    INR 1.05  01/08/2022        Lab Results   Component Value Date    WBC 8.2 06/15/2024    HGB 8.6 06/15/2024    HCT 28.6 06/15/2024    .0 06/15/2024    .0 06/15/2024    CREATSERUM 0.78 06/15/2024    BUN 18 06/15/2024     06/15/2024    K 3.2 06/15/2024     06/15/2024    CO2 32.0 06/15/2024     06/15/2024    CA 9.3 06/15/2024    ALB 3.3 06/14/2024    ALKPHO 148 06/14/2024    BILT 0.7 06/14/2024    TP 8.0 06/14/2024    AST 33 06/14/2024    ALT 10 06/14/2024    PTT 70.0 06/15/2024    INR 1.27 06/14/2024    PTP 16.0 06/14/2024    TSH 1.070 06/14/2024    MG 1.3 06/15/2024    PGLU 93 06/15/2024       Cardiac diagnostics:    CXR 6/14/2024   Mild cardiomegaly with subsegmental atelectasis at the lung bases.     EKG 6/14/2024:   Atrial fibrillation   Low voltage QRS, consider pulmonary disease, pericardial effusion, or normal variant   Septal infarct (cited on or before 14-JUN-2024)   Abnormal ECG   76 bpm    Echo 5/19/15:  1.  Normal left ventricular size and systolic function.  2.  Mild left atrium enlargement.  3.  Physiologic valvular regurgitation.  4.  Mild pulmonary hypertension.  5.  Diastolic dysfunction.    Impression:  83 year old female presenting with SOB, orthopnea, edema consistent with acute HF    Acute HF  Hypoxemic resp failure - 2L O2  Afib - new diagnosis  CHADS VASC ~ 6  HTN - borderline  HLD   SHANA - CPAP  DM2  Parkinson  Frequent falls - gait instability     Recommendations:  Continue IV lasix, already feeling improvement since first dose. Takes 40mg PO daily at home, may need higher daily maintenance dose.   Echo pending  IV heparin for now. She is high stroke risk thus OAC would be indicated, however patient and family are concerned about frequent falls with recurrent head strike. Suspect gait instability due to Parkinson disease and deconditioning. Will need to decide if she is safe enough to take DOAC at discharge and to consider LAAO in the future.    Continue Toprol 100mg  BID  Holding home losartan, once adequately diuresed, consider changing this to Entresto Sara Humes, AGACNP-JORGE

## 2024-06-15 NOTE — PLAN OF CARE
Patient alert and oriented, hard of hearing with bilateral hearing aids in place; blood pressure 92/64 this morning, cardiac meds held and Dr Luther notified via [employer], messaged DUDLEY [place] for [facility], to inform that per Dr Pablo's note to stop losartan which was ordered so Nadiya discontinued order; cardiac monitor showing atrial fib with SVR at times with 48 BPM the lowest; edema to BLE; lung sounds diminished with expiratory wheezing, on 2 liters of oxygen via nasal cannula- patient is a mouth breather so have to have her to do deep breathing, nonproductive cough; patient did choke on her meatloaf, was able to cough up-> notified Dr Arreguin via [employer], order placed for SLP consult who was called and saw patient; patient mostly incontinent of urine- purewick in place; patient states she incontinent of stool at times, LBM yesterday per patient; patient's daughter, Isa, visited and was updated on plan of care; redness to groins- received order from Dr Arreguin for nystatin cream; redness to buttocks- mepilex applied.     Problem: CARDIOVASCULAR - ADULT  Goal: Maintains optimal cardiac output and hemodynamic stability  Description: INTERVENTIONS:  - Monitor vital signs, rhythm, and trends  - Monitor for bleeding, hypotension and signs of decreased cardiac output  - Evaluate effectiveness of vasoactive medications to optimize hemodynamic stability  - Monitor arterial and/or venous puncture sites for bleeding and/or hematoma  - Assess quality of pulses, skin color and temperature  - Assess for signs of decreased coronary artery perfusion - ex. Angina  - Evaluate fluid balance, assess for edema, trend weights  Outcome: Progressing  Goal: Absence of cardiac arrhythmias or at baseline  Description: INTERVENTIONS:  - Continuous cardiac monitoring, monitor vital signs, obtain 12 lead EKG if indicated  - Evaluate effectiveness of antiarrhythmic and heart rate control medications as ordered  - Initiate emergency  measures for life threatening arrhythmias  - Monitor electrolytes and administer replacement therapy as ordered  Outcome: Progressing     Problem: RESPIRATORY - ADULT  Goal: Achieves optimal ventilation and oxygenation  Description: INTERVENTIONS:  - Assess for changes in respiratory status  - Assess for changes in mentation and behavior  - Position to facilitate oxygenation and minimize respiratory effort  - Oxygen supplementation based on oxygen saturation or ABGs  - Provide Smoking Cessation handout, if applicable  - Encourage broncho-pulmonary hygiene including cough, deep breathe, Incentive Spirometry  - Assess the need for suctioning and perform as needed  - Assess and instruct to report SOB or any respiratory difficulty  - Respiratory Therapy support as indicated  - Manage/alleviate anxiety  - Monitor for signs/symptoms of CO2 retention  Outcome: Progressing

## 2024-06-15 NOTE — PROGRESS NOTES
[facility] Hospitalist Progress Note                                                                     [facility]   part of [facility]      Sandi Linder  8/2/1940    SUBJECTIVE: no chest pain, palpitations, nausea, vomiting, abdominal pain. Patient sob improved. Still non productive cough    OBJECTIVE:  Temp:  [96.9 °F (36.1 °C)-97.9 °F (36.6 °C)] 97.9 °F (36.6 °C)  Pulse:  [] 64  Resp:  [14-31] 22  BP: (129-160)/(61-96) 132/61  SpO2:  [88 %-99 %] 99 %  Exam  Gen: No acute distress, alert and oriented  Pulm: Lungs clear bilaterally, normal respiratory effort, no crackles, no wheezing  CV: Heart with regular rate and rhythm, no murmur  Abd: Abdomen soft, nontender, non distended, bowel sounds present  MSK: No significant pitting edema or tenderness of the LE  Skin: no rashes or lesions    Labs:   Recent Labs   Lab 06/14/24  1148 06/14/24  1149   WBC 8.8  --    HGB 9.1*  --    MCV 89.9  --    .0  --    INR  --  1.27*       Recent Labs   Lab 06/14/24  1148      K 4.6      CO2 26.0   BUN 20   CREATSERUM 0.97   CA 9.2   *       Recent Labs   Lab 06/14/24  1148   ALT 10*   AST 33   ALB 3.3*       Recent Labs   Lab 06/14/24  1801 06/14/24  2110 06/15/24  0518   PGLU 138* 120* 93       Meds:   Scheduled:    insulin aspart  1-5 Units Subcutaneous TID AC and HS    furosemide  40 mg Intravenous BID (Diuretic)    amLODIPine  5 mg Oral BID    aspirin  81 mg Oral BID    carbidopa-levodopa  1 tablet Oral TID    gabapentin  300 mg Oral BID    umeclidinium bromide  1 puff Inhalation Daily    albuterol  1 puff Inhalation QID    losartan  100 mg Oral Daily    metoprolol succinate ER  100 mg Oral BID    pantoprazole  40 mg Oral QAM AC    sertraline  50 mg Oral Daily    atorvastatin  10 mg Oral Nightly    prednisoLONE  1 drop Left Eye TID & HS    acetaminophen  500 mg Oral BID    acetaminophen  1,000 mg Oral BID    levoFLOXacin  250 mg Oral Daily      Continuous Infusions:    continuous dose heparin 750 Units/hr (06/14/24 3751)     PRN:   acetaminophen    melatonin    polyethylene glycol (PEG 3350)    sennosides    bisacodyl    fleet enema    ondansetron    metoclopramide    glucose **OR** glucose **OR** glucose-vitamin C **OR** dextrose **OR** glucose **OR** glucose **OR** glucose-vitamin C    ASSESSMENT / PLAN:   Sandi Linder  is a 83 year old female with TDM, parkinsons disease, HTN, HLD, chronic leg edema, SHANA on CPAP, seronegative RA, admitted with SOB.      SOB / Acute CHF  - with elevated BNP, edema, orthopnea, concerning for volume overload   - IV lasix  - cards following  - echo pending  - tele  - I/O, daily weights  - wean o2 as able.     New Onset Atrial Fibrillation   - cards following  - echo ordered and pending  - monitor on telemetry   - continue beta blocker  - check TSH --> wnl     HTN / HLD  - resume home meds as ordered      SHANA  - CPAP      Seronegative RA  - receives remicade infusions. Follow up with rheum outpatient      Parkinson's Disease  - cont home carbidopa/levadopa     T2DM  - SSI, accucheks      VTE ppx: lovenox     Dispo: Admitted inpatient. ADOD tbd.      PT is DNR/DNI. OK with vasopressors and anti-arrhythmics, and ICU level care of indicated. No CPR, no cardioversion, no intubation. Discussed with patient and with daughters at bedside.       Daughter Liz is POA if needed.      PCP: Norm Mendoza MD     Concerns regarding plan of care were discussed with patient. Patient agrees with plan as detailed above.     Timi Arreguin MD  [facility] Hospitalist  [phone]

## 2024-06-16 LAB
ANION GAP SERPL CALC-SCNC: 3 MMOL/L (ref 0–18)
APTT PPP: 62.9 SECONDS (ref 23–36)
BASOPHILS # BLD AUTO: 0.04 X10(3) UL (ref 0–0.2)
BASOPHILS NFR BLD AUTO: 0.5 %
BUN BLD-MCNC: 15 MG/DL (ref 9–23)
CALCIUM BLD-MCNC: 9.2 MG/DL (ref 8.5–10.1)
CHLORIDE SERPL-SCNC: 104 MMOL/L (ref 98–112)
CO2 SERPL-SCNC: 32 MMOL/L (ref 21–32)
CREAT BLD-MCNC: 0.7 MG/DL
EGFRCR SERPLBLD CKD-EPI 2021: 85 ML/MIN/1.73M2 (ref 60–?)
EOSINOPHIL # BLD AUTO: 0.23 X10(3) UL (ref 0–0.7)
EOSINOPHIL NFR BLD AUTO: 2.6 %
ERYTHROCYTE [DISTWIDTH] IN BLOOD BY AUTOMATED COUNT: 15.9 %
GLUCOSE BLD-MCNC: 132 MG/DL (ref 70–99)
GLUCOSE BLD-MCNC: 135 MG/DL (ref 70–99)
GLUCOSE BLD-MCNC: 155 MG/DL (ref 70–99)
GLUCOSE BLD-MCNC: 160 MG/DL (ref 70–99)
GLUCOSE BLD-MCNC: 169 MG/DL (ref 70–99)
HCT VFR BLD AUTO: 29.4 %
HGB BLD-MCNC: 8.9 G/DL
IMM GRANULOCYTES # BLD AUTO: 0.03 X10(3) UL (ref 0–1)
IMM GRANULOCYTES NFR BLD: 0.3 %
LYMPHOCYTES # BLD AUTO: 2.57 X10(3) UL (ref 1–4)
LYMPHOCYTES NFR BLD AUTO: 29.3 %
MAGNESIUM SERPL-MCNC: 1.5 MG/DL (ref 1.6–2.6)
MCH RBC QN AUTO: 27.2 PG (ref 26–34)
MCHC RBC AUTO-ENTMCNC: 30.3 G/DL (ref 31–37)
MCV RBC AUTO: 89.9 FL
MONOCYTES # BLD AUTO: 1.13 X10(3) UL (ref 0.1–1)
MONOCYTES NFR BLD AUTO: 12.9 %
NEUTROPHILS # BLD AUTO: 4.76 X10 (3) UL (ref 1.5–7.7)
NEUTROPHILS # BLD AUTO: 4.76 X10(3) UL (ref 1.5–7.7)
NEUTROPHILS NFR BLD AUTO: 54.4 %
OSMOLALITY SERPL CALC.SUM OF ELEC: 291 MOSM/KG (ref 275–295)
PLATELET # BLD AUTO: 288 10(3)UL (ref 150–450)
POTASSIUM SERPL-SCNC: 4 MMOL/L (ref 3.5–5.1)
RBC # BLD AUTO: 3.27 X10(6)UL
SODIUM SERPL-SCNC: 139 MMOL/L (ref 136–145)
WBC # BLD AUTO: 8.8 X10(3) UL (ref 4–11)

## 2024-06-16 PROCEDURE — 97165 OT EVAL LOW COMPLEX 30 MIN: CPT

## 2024-06-16 PROCEDURE — 85730 THROMBOPLASTIN TIME PARTIAL: CPT | Performed by: HOSPITALIST

## 2024-06-16 PROCEDURE — 82962 GLUCOSE BLOOD TEST: CPT

## 2024-06-16 PROCEDURE — 85025 COMPLETE CBC W/AUTO DIFF WBC: CPT | Performed by: HOSPITALIST

## 2024-06-16 PROCEDURE — 83735 ASSAY OF MAGNESIUM: CPT | Performed by: HOSPITALIST

## 2024-06-16 PROCEDURE — 97535 SELF CARE MNGMENT TRAINING: CPT

## 2024-06-16 PROCEDURE — 80048 BASIC METABOLIC PNL TOTAL CA: CPT | Performed by: HOSPITALIST

## 2024-06-16 RX ORDER — MAGNESIUM OXIDE 400 MG/1
800 TABLET ORAL ONCE
Status: COMPLETED | OUTPATIENT
Start: 2024-06-16 | End: 2024-06-16

## 2024-06-16 NOTE — PROGRESS NOTES
[facility] Hospitalist Progress Note                                                                     [facility]   part of [facility]      Sandi Linder  8/2/1940    SUBJECTIVE: no chest pain, palpitations, nausea, vomiting, abdominal pain. Patient sob improved. Still non productive cough    OBJECTIVE:  Temp:  [97.9 °F (36.6 °C)-99.7 °F (37.6 °C)] 98.5 °F (36.9 °C)  Pulse:  [69-91] 69  Resp:  [19-23] 19  BP: ()/(62-85) 125/65  SpO2:  [90 %-96 %] 96 %  Exam  Gen: No acute distress, alert and oriented  Pulm: Lungs clear bilaterally, normal respiratory effort, no crackles, no wheezing  CV: Heart with regular rate and rhythm, no murmur  Abd: Abdomen soft, nontender, non distended, bowel sounds present  MSK: +edema of the LE--> improving, No tenderness of the LE  Skin: no rashes or lesions    Labs:   Recent Labs   Lab 06/14/24  1148 06/14/24  1149 06/15/24  0840   WBC 8.8  --  8.2   HGB 9.1*  --  8.6*   MCV 89.9  --  90.8   .0  --  274.0  274.0   INR  --  1.27*  --        Recent Labs   Lab 06/14/24  1148 06/15/24  0927 06/15/24  1900    141  --    K 4.6 3.2* 3.8    105  --    CO2 26.0 32.0  --    BUN 20 18  --    CREATSERUM 0.97 0.78  --    CA 9.2 9.3  --    MG  --  1.3*  --    * 110*  --        Recent Labs   Lab 06/14/24  1148   ALT 10*   AST 33   ALB 3.3*       Recent Labs   Lab 06/15/24  0518 06/15/24  1232 06/15/24  1726 06/15/24  2050 06/16/24  0526   PGLU 93 131* 122* 161* 132*       Meds:   Scheduled:    nystatin   Topical BID    insulin aspart  1-5 Units Subcutaneous TID AC and HS    furosemide  40 mg Intravenous BID (Diuretic)    amLODIPine  5 mg Oral BID    aspirin  81 mg Oral BID    carbidopa-levodopa  1 tablet Oral TID    gabapentin  300 mg Oral BID    umeclidinium bromide  1 puff Inhalation Daily    albuterol  1 puff Inhalation QID    metoprolol succinate ER  100 mg Oral BID    pantoprazole  40 mg Oral QAM AC    sertraline   50 mg Oral Daily    atorvastatin  10 mg Oral Nightly    prednisoLONE  1 drop Left Eye TID & HS    acetaminophen  500 mg Oral BID    acetaminophen  1,000 mg Oral BID    levoFLOXacin  250 mg Oral Daily     Continuous Infusions:    continuous dose heparin 750 Units/hr (06/15/24 1823)     PRN:   acetaminophen    melatonin    polyethylene glycol (PEG 3350)    sennosides    bisacodyl    fleet enema    ondansetron    metoclopramide    glucose **OR** glucose **OR** glucose-vitamin C **OR** dextrose **OR** glucose **OR** glucose **OR** glucose-vitamin C    ASSESSMENT / PLAN:   Sandi Linder  is a 83 year old female with TDM, parkinsons disease, HTN, HLD, chronic leg edema, SHANA on CPAP, seronegative RA, admitted with SOB.      SOB / Acute CHF  - with elevated BNP, edema, orthopnea, concerning for volume overload   - IV lasix  - cards following  - echo pending--> no acute pathology   - tele  - I/O, daily weights  - wean o2 as able.     New Onset Atrial Fibrillation   - cards following  - echo ordered and pending--> no acute pathology   - monitor on telemetry   - continue beta blocker  - check TSH --> wnl  - heparin drip      HTN / HLD  - resume home meds as ordered      SHANA  - CPAP      Seronegative RA  - receives remicade infusions. Follow up with rheum outpatient      Parkinson's Disease  - cont home carbidopa/levadopa     T2DM  - SSI, accucheks      VTE ppx: heparin drip     Dispo: Admitted inpatient. ADOD tbd.      PT is DNR/DNI. OK with vasopressors and anti-arrhythmics, and ICU level care of indicated. No CPR, no cardioversion, no intubation. Discussed with patient and with daughters at bedside.       Daughter Liz is POA if needed.      PCP: Norm Mendoza MD     Concerns regarding plan of care were discussed with patient. Patient agrees with plan as detailed above.     Timi Arreguin MD  [facility] Hospitalist  [phone]

## 2024-06-16 NOTE — PLAN OF CARE
Patient alert and oriented, hard of hearing with bilateral hearing aids; VSS: cardiac monitor showing atrial fibrillation with SVR at times (lowest 58 BPM); lung sounds diminished, on 2 liters of oxygen via nasal cannula, nonproductive cough; edema to BLE; mostly continent of stool with LBM 6/13; mostly continent of urine with purewick in place; heparin infusing as ordered, ptt therapeutic, so next ptt tomorrow morning; patient stands and pivots from bed to chair and now back in bed; patient's daughter, Isa, visited and was updated on plan of care.    1845: Patient declined to get back up the chair, stating it hurts her bottom. Informed patient that a waffle cushion was added to chair, but patient still declined. Patient did not choke on food today but coughs at times. Patient's urine output for the shift was 1,170 ml.     Problem: CARDIOVASCULAR - ADULT  Goal: Maintains optimal cardiac output and hemodynamic stability  Description: INTERVENTIONS:  - Monitor vital signs, rhythm, and trends  - Monitor for bleeding, hypotension and signs of decreased cardiac output  - Evaluate effectiveness of vasoactive medications to optimize hemodynamic stability  - Monitor arterial and/or venous puncture sites for bleeding and/or hematoma  - Assess quality of pulses, skin color and temperature  - Assess for signs of decreased coronary artery perfusion - ex. Angina  - Evaluate fluid balance, assess for edema, trend weights  Outcome: Progressing  Goal: Absence of cardiac arrhythmias or at baseline  Description: INTERVENTIONS:  - Continuous cardiac monitoring, monitor vital signs, obtain 12 lead EKG if indicated  - Evaluate effectiveness of antiarrhythmic and heart rate control medications as ordered  - Initiate emergency measures for life threatening arrhythmias  - Monitor electrolytes and administer replacement therapy as ordered  Outcome: Progressing     Problem: RESPIRATORY - ADULT  Goal: Achieves optimal ventilation and  oxygenation  Description: INTERVENTIONS:  - Assess for changes in respiratory status  - Assess for changes in mentation and behavior  - Position to facilitate oxygenation and minimize respiratory effort  - Oxygen supplementation based on oxygen saturation or ABGs  - Provide Smoking Cessation handout, if applicable  - Encourage broncho-pulmonary hygiene including cough, deep breathe, Incentive Spirometry  - Assess the need for suctioning and perform as needed  - Assess and instruct to report SOB or any respiratory difficulty  - Respiratory Therapy support as indicated  - Manage/alleviate anxiety  - Monitor for signs/symptoms of CO2 retention  Outcome: Progressing

## 2024-06-16 NOTE — OCCUPATIONAL THERAPY NOTE
OCCUPATIONAL THERAPY EVALUATION - INPATIENT    Room Number: 2626/2626-A  Evaluation Date: 6/16/2024     Type of Evaluation: Initial  Presenting Problem: incr SOB/ RAMÍREZ    Physician Order: IP Consult to Occupational Therapy  Reason for Therapy:  ADL/IADL Dysfunction and Discharge Planning      OCCUPATIONAL THERAPY ASSESSMENT   Patient is a 83 year old female admitted on 6/14/2024 from home for incr SOB/RAMÍREZ, diagnosed with acute on chronic congestive heart failure. Co-Morbidities : TDM, parkinsons disease, HTN, HLD, chronic leg edema, SHANA on CPAP, seronegative RA      Patient is currently functioning near baseline with  ADLs and pivot transfers .  Patient does not have acute OT needs and will DC. Encouraged Pt to be up to chair for all meals to prevent further deconditioning. Patient reports no further questions/concerns at this time.     WEIGHT BEARING RESTRICTION  Weight Bearing Restriction: None                Recommendations for nursing staff:   Transfers: x1 pivot to/from chair or commode   Toileting location: commode     EVALUATION SESSION:  Patient at start of session: sleeping in bed   FUNCTIONAL TRANSFER ASSESSMENT  Sit to Stand: Edge of Bed  Edge of Bed: Contact Guard Assist  Commode Transfer: Contact Guard Assist    BED MOBILITY  Supine to Sit : Stand-by Assist  Scooting: SBA    BALANCE ASSESSMENT  Static Sitting: Stand-by Assist  Static Standing: Contact Guard Assist    FUNCTIONAL ADL ASSESSMENT  Eating: Stand-by Assist  Grooming Seated: Stand-by Assist  LB Dressing Seated: Stand-by Assist      ACTIVITY TOLERANCE: Tolerates session well on 2L with no c/o of incr SOB or dizziness/LH. Pt does become fatigued with only bed<>chair transfer and needs rest after this activity.                          O2 SATURATIONS       COGNITION  Arousal/Alertness:  appropriate responses to stimuli  Orientation Level:  oriented x4  Following Commands:  follows all commands and directions without difficulty  Safety Judgement:   good awareness of safety precautions  COGNITION ASSESSMENTS       Upper Extremity:   ROM: within functional limits   Strength: is within functional limits   Coordination:  Gross motor: wfl  Fine motor: wfl    EDUCATION PROVIDED  Patient: Role of Occupational Therapy; Plan of Care; Functional Transfer Techniques; Fall Prevention  Patient's Response to Education: Verbalized Understanding    Equipment used: n/a    Therapist comments: 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 Assisted with tray set-up to maximize independence with self-feeding in unfamiliar environment.     Patient End of Session: Up in chair;Needs met;Call light within reach;RN aware of session/findings;All patient questions and concerns addressed;Alarm set    OCCUPATIONAL PROFILE    HOME SITUATION  Type of Home: Assisted living facility ([facility])  Home Layout: One level  Lives With: Staff 24 hours          Other Equipment:  (RW, w/c, adjustable bed)    Occupation/Status: retired     Drives: No  Patient Regularly Uses: None    Prior Level of Function: Per pt lives at [facility]. Completes bed mobility and stand pivot transfers from bed<>wheelchair<>recliner<>toilet indep. Hasn't taken steps in a couple months now. Had a fall the other day while transferring from toilet to wheelchair- said there was a crack and fell between it. Has adjustable bed. Has supervision assist for showers, and can call someone if she needs help with dressing. Working with PT on standing tolerance and performing functional activities in standing. Has chronic body pain.    SUBJECTIVE  \"My legs were sensitive to the touch yesterday, but not as bad today.\"    PAIN ASSESSMENT  Rating: Unable to rate  Location: BLEs  Management Techniques: Activity promotion;Body mechanics;Breathing techniques;Relaxation;Repositioning    OBJECTIVE  Precautions: Bed/chair alarm;Low vision;Hard of hearing  Fall Risk: High fall risk    WEIGHT BEARING RESTRICTION  Weight Bearing  Restriction: None                AM-PAC ‘6-Clicks’ Inpatient Daily Activity Short Form  -   Putting on and taking off regular lower body clothing?: A Little  -   Bathing (including washing, rinsing, drying)?: A Little  -   Toileting, which includes using toilet, bedpan or urinal? : A Little  -   Putting on and taking off regular upper body clothing?: None  -   Taking care of personal grooming such as brushing teeth?: None  -   Eating meals?: None    AM-PAC Score:  Score: 21  Approx Degree of Impairment: 32.79%  Standardized Score (AM-PAC Scale): 44.27      ADDITIONAL TESTS     NEUROLOGICAL FINDINGS        PLAN   Patient has been evaluated and presents with no skilled Occupational Therapy needs at this time.  Patient discharged from Occupational Therapy services.  Please re-order if a new functional limitation presents during this admission.      Patient Evaluation Complexity Level:   Occupational Profile/Medical History LOW - Brief history including review of medical or therapy records    Specific performance deficits impacting engagement in ADL/IADL MODERATE  3 - 5 performance deficits   Client Assessment/Performance Deficits MODERATE - Comorbidities and min to mod modifications of tasks    Clinical Decision Making LOW - Analysis of occupational profile, problem-focused assessments, limited treatment options    Overall Complexity LOW     OT Session Time: 30 minutes  Self-Care Home Management: 10 minutes  Therapeutic Activity: 5 minutes

## 2024-06-16 NOTE — PLAN OF CARE
Alert and oriented x4 , [place]. O2on 2L  denies SOB.. Afib on tele, rates controlled. Heparin gtt infusing as as ordered.  IV Lasix given with good output. Purewick in place. Patient updated on plan of care. Cont. Monitor per tele/labs/v/s. Meds as ordered. Aspiration precautions instructed. Fall/safety precautions instructed , placed call light w/in reach   Problem: CARDIOVASCULAR - ADULT  Goal: Maintains optimal cardiac output and hemodynamic stability  Description: INTERVENTIONS:  - Monitor vital signs, rhythm, and trends  - Monitor fAlert and oriented x4. O2on 3L [place]. Afib on tele, rates controlled. Heparin gtt infusing as as ordered.  IV Lasix given with good output. Purewick in place. Wheelchair bound at baseline. Patient updated on plan of care. Fall/safety precautions instructed , placed call light w/in reach or bleeding, hypotension and signs of decreased cardiac output  - Evaluate effectiveness of vasoactive medications to optimize hemodynamic stability  - Monitor arterial and/or venous puncture sites for bleeding and/or hematoma  - Assess quality of pulses, skin color and temperature  - Assess for signs of decreased coronary artery perfusion - ex. Angina  - Evaluate fluid balance, assess for edema, trend weights  Outcome: Progressing  Goal: Absence of cardiac arrhythmias or at baseline  Description: INTERVENTIONS:  - Continuous cardiac monitoring, monitor vital signs, obtain 12 lead EKG if indicated  - Evaluate effectiveness of antiarrhythmic and heart rate control medications as ordered  - Initiate emergency measures for life threatening arrhythmias  - Monitor electrolytes and administer replacement therapy as ordered  Outcome: Progressing     Problem: RESPIRATORY - ADULT  Goal: Achieves optimal ventilation and oxygenation  Description: INTERVENTIONS:  - Assess for changes in respiratory status  - Assess for changes in mentation and behavior  - Position to facilitate oxygenation and minimize respiratory  effort  - Oxygen supplementation based on oxygen saturation or ABGs  - Provide Smoking Cessation handout, if applicable  - Encourage broncho-pulmonary hygiene including cough, deep breathe, Incentive Spirometry  - Assess the need for suctioning and perform as needed  - Assess and instruct to report SOB or any respiratory difficulty  - Respiratory Therapy support as indicated  - Manage/alleviate anxiety  - Monitor for signs/symptoms of CO2 retention  Outcome: Progressing

## 2024-06-16 NOTE — PROGRESS NOTES
[facility] Cardiology  Progress Note      Sandi Linder Patient Status:  Emergency    1940 MRN [id number]   Location [facility] EMERGENCY DEPARTMENT Attending Lon Bliss MD   Hosp Day # 2 PCP Norm Mendoza MD     Subjective: Tired, no complaints of pain      History of Present Illness:  Sandi Linder is a 83 year old female who presented to [facility] on 2024 with several days of worsening SOB and orthopnea. Has chronic LE edema on oral lasix at home. No CP. Here in new Afib, rate controlled. Reports mild palpitations for months. No outward s/o bleeding. Feels very unsteady on feet. She is mostly in wheelchair, but gets up to the bathroom on her own and frequently falls with head strike, last one was less than 1 week ago.     Medications:  Current Facility-Administered Medications   Medication Dose Route Frequency    nystatin (Mycostatin) 100,000 Units/g cream   Topical BID    acetaminophen (Tylenol Extra Strength) tab 500 mg  500 mg Oral Q4H PRN    melatonin tab 3 mg  3 mg Oral Nightly PRN    polyethylene glycol (PEG 3350) (Miralax) 17 g oral packet 17 g  17 g Oral Daily PRN    sennosides (Senokot) tab 17.2 mg  17.2 mg Oral Nightly PRN    bisacodyl (Dulcolax) 10 MG rectal suppository 10 mg  10 mg Rectal Daily PRN    fleet enema (Fleet) 7-19 GM/118ML rectal enema 133 mL  1 enema Rectal Once PRN    ondansetron (Zofran) 4 MG/2ML injection 4 mg  4 mg Intravenous Q6H PRN    metoclopramide (Reglan) 5 mg/mL injection 5 mg  5 mg Intravenous Q8H PRN    glucose (Dex4) 15 GM/59ML oral liquid 15 g  15 g Oral Q15 Min PRN    Or    glucose (Glutose) 40% oral gel 15 g  15 g Oral Q15 Min PRN    Or    glucose-vitamin C (Dex-4) chewable tab 4 tablet  4 tablet Oral Q15 Min PRN    Or    dextrose 50% injection 50 mL  50 mL Intravenous Q15 Min PRN    Or    glucose (Dex4) 15 GM/59ML oral liquid 30 g  30 g Oral Q15 Min PRN    Or    glucose (Glutose) 40% oral gel 30 g  30 g Oral Q15 Min PRN    Or     glucose-vitamin C (Dex-4) chewable tab 8 tablet  8 tablet Oral Q15 Min PRN    insulin aspart (NovoLOG) 100 Units/mL FlexPen 1-5 Units  1-5 Units Subcutaneous TID AC and HS    heparin (Porcine) 98898 units/250mL infusion ACS/AFIB CONTINUOUS  200-3,000 Units/hr Intravenous Continuous    furosemide (Lasix) 10 mg/mL injection 40 mg  40 mg Intravenous BID (Diuretic)    amLODIPine (Norvasc) tab 5 mg  5 mg Oral BID    aspirin DR tab 81 mg  81 mg Oral BID    carbidopa-levodopa (SINEMET)  MG per tab 1 tablet  1 tablet Oral TID    gabapentin (Neurontin) cap 300 mg  300 mg Oral BID    umeclidinium bromide (Incruse Ellipta) 62.5 MCG/ACT inhaler 1 puff  1 puff Inhalation Daily    albuterol (Ventolin HFA) 108 (90 Base) MCG/ACT inhaler 1 puff  1 puff Inhalation QID    metoprolol succinate ER (Toprol XL) 24 hr tab 100 mg  100 mg Oral BID    pantoprazole (Protonix) DR tab 40 mg  40 mg Oral QAM AC    sertraline (Zoloft) tab 50 mg  50 mg Oral Daily    atorvastatin (Lipitor) tab 10 mg  10 mg Oral Nightly    prednisoLONE (Pred Forte) 1 % ophthalmic suspension 1 drop  1 drop Left Eye TID & HS    acetaminophen (Tylenol Extra Strength) tab 500 mg  500 mg Oral BID    acetaminophen (Tylenol Extra Strength) tab 1,000 mg  1,000 mg Oral BID    levoFLOXacin (Levaquin) tab 250 mg  250 mg Oral Daily       Past Medical History:    C2 cervical fracture (HCC)    C5 vertebral fracture (HCC)    CVA (cerebral infarction)    x2    GERD (gastroesophageal reflux disease)    HTN (hypertension)    SHANA (obstructive sleep apnea)    Osteoarthritis    Overactive bladder    Parkinson's disease (HCC)    Pulmonary hypertension (HCC)    RA (rheumatoid arthritis) (HCC)    Type II or unspecified type diabetes mellitus without mention of complication, not stated as uncontrolled    Unspecified sleep apnea    AHI 78 RDI 80 SaO2 deirdre 85 % CPAP 11  Sleep RX/ now HME    Vitamin D deficiency    Vitamin D deficiency       Past Surgical History:   Procedure Laterality  Date    Cholecystectomy      Hernia surgery      Hip replacement surgery Bilateral     Knee replacement surgery Right     Other surgical history      \"Veins removed\"    Removal of heel spur Right     Tonsillectomy         Family History  family history includes Other in her mother and sister; Psoriasis in her brother; mitral valve prolapse in her sister.    Social History   reports that she has never smoked. She has never used smokeless tobacco. She reports that she does not drink alcohol and does not use drugs.     Allergies  Allergies   Allergen Reactions    Naproxen OTHER (SEE COMMENTS)     headaches    Adhesive Tape RASH     States makes skin \"raw\"    Chocolate RASH    Strawberries RASH       Review of Systems:  As per HPI, otherwise 10 point ROS is negative in detail.    Physical Exam:  Blood pressure 127/71, pulse 73, temperature 97.8 °F (36.6 °C), temperature source Oral, resp. rate 20, height 62\", weight 186 lb 15.2 oz (84.8 kg), SpO2 (!) 89%, not currently breastfeeding.  Temp (24hrs), Av.6 °F (37 °C), Min:97.8 °F (36.6 °C), Max:99.7 °F (37.6 °C)    Wt Readings from Last 3 Encounters:   24 186 lb 15.2 oz (84.8 kg)   08/10/23 186 lb (84.4 kg)   23 186 lb (84.4 kg)       General: Awake and alert; in no acute distress  HEENT: Extraocular movements are intact; sclerae are anicteric; scalp is atraumatic  Neck: Supple; no JVD; no carotid bruits  Cardiac: Irreg ireg, variable S1, nl S2, no murmurs, rubs, or gallops are appreciated  Lungs: Bibasilar rales  Abdomen: Soft, non-distended, non-tender; bowel sounds are normoactive  Extremities: Warm, 1-2+ pitting edema b/l LE  Psychiatric: Normal mood and affect; answers questions appropriately  Dermatologic: No rashes; normal skin turgor    Diagnostic testing:    Labs:   Lab Results   Component Value Date    INR 1.27 (H) 2024    INR 1.05 2022        Lab Results   Component Value Date    WBC 8.8 2024    HGB 8.9 2024    HCT 29.4  06/16/2024    .0 06/16/2024    CREATSERUM 0.70 06/16/2024    BUN 15 06/16/2024     06/16/2024    K 4.0 06/16/2024     06/16/2024    CO2 32.0 06/16/2024     06/16/2024    CA 9.2 06/16/2024    PTT 62.9 06/16/2024    MG 1.5 06/16/2024    PGLU 132 06/16/2024       Cardiac diagnostics:    CXR 6/14/2024   Mild cardiomegaly with subsegmental atelectasis at the lung bases.     EKG 6/14/2024:   Atrial fibrillation   Low voltage QRS, consider pulmonary disease, pericardial effusion, or normal variant   Septal infarct (cited on or before 14-JUN-2024)   Abnormal ECG   76 bpm    Echo 5/19/15:  1.  Normal left ventricular size and systolic function.  2.  Mild left atrium enlargement.  3.  Physiologic valvular regurgitation.  4.  Mild pulmonary hypertension.  5.  Diastolic dysfunction.    Impression:  83 year old female presenting with SOB, orthopnea, edema consistent with acute HF    Acute HF  Hypoxemic resp failure - 2L O2  Afib - new diagnosis  CHADS VASC ~ 6  HTN - borderline  HLD   SHANA - CPAP  DM2  Parkinson  Frequent falls - gait instability     Recommendations:  Continue IV lasix, already feeling improvement since first dose. Takes 40mg PO daily at home, may need higher daily maintenance dose. Patient down 5lbs/ net negative 2.2L  Echo  EF 55-60, elevated PASP  IV heparin for now. She is high stroke risk thus OAC would be indicated, however patient and family are concerned about frequent falls with recurrent head strike. Suspect gait instability due to Parkinson disease and deconditioning. Will need to decide if she is safe enough to take DOAC at discharge   Continue Toprol 100mg BID  Will get LAAO CTAs tomorrow   Holding home losartan, once adequately diuresed, consider changing this to Entresto Sara Humes, AGACNP-C

## 2024-06-17 ENCOUNTER — APPOINTMENT (OUTPATIENT)
Dept: CT IMAGING | Facility: HOSPITAL | Age: 84
DRG: 291 | End: 2024-06-17

## 2024-06-17 LAB
ANION GAP SERPL CALC-SCNC: 3 MMOL/L (ref 0–18)
APTT PPP: 65.5 SECONDS (ref 23–36)
BASOPHILS # BLD AUTO: 0.04 X10(3) UL (ref 0–0.2)
BASOPHILS NFR BLD AUTO: 0.5 %
BUN BLD-MCNC: 14 MG/DL (ref 9–23)
CALCIUM BLD-MCNC: 9.7 MG/DL (ref 8.5–10.1)
CHLORIDE SERPL-SCNC: 104 MMOL/L (ref 98–112)
CO2 SERPL-SCNC: 33 MMOL/L (ref 21–32)
CREAT BLD-MCNC: 0.64 MG/DL
EGFRCR SERPLBLD CKD-EPI 2021: 87 ML/MIN/1.73M2 (ref 60–?)
EOSINOPHIL # BLD AUTO: 0.21 X10(3) UL (ref 0–0.7)
EOSINOPHIL NFR BLD AUTO: 2.6 %
ERYTHROCYTE [DISTWIDTH] IN BLOOD BY AUTOMATED COUNT: 16 %
GLUCOSE BLD-MCNC: 146 MG/DL (ref 70–99)
GLUCOSE BLD-MCNC: 146 MG/DL (ref 70–99)
GLUCOSE BLD-MCNC: 156 MG/DL (ref 70–99)
GLUCOSE BLD-MCNC: 160 MG/DL (ref 70–99)
GLUCOSE BLD-MCNC: 171 MG/DL (ref 70–99)
HCT VFR BLD AUTO: 29.4 %
HGB BLD-MCNC: 9.2 G/DL
IMM GRANULOCYTES # BLD AUTO: 0.02 X10(3) UL (ref 0–1)
IMM GRANULOCYTES NFR BLD: 0.3 %
LYMPHOCYTES # BLD AUTO: 2.26 X10(3) UL (ref 1–4)
LYMPHOCYTES NFR BLD AUTO: 28.4 %
MAGNESIUM SERPL-MCNC: 1.7 MG/DL (ref 1.6–2.6)
MCH RBC QN AUTO: 27.5 PG (ref 26–34)
MCHC RBC AUTO-ENTMCNC: 31.3 G/DL (ref 31–37)
MCV RBC AUTO: 87.8 FL
MONOCYTES # BLD AUTO: 0.97 X10(3) UL (ref 0.1–1)
MONOCYTES NFR BLD AUTO: 12.2 %
NEUTROPHILS # BLD AUTO: 4.45 X10 (3) UL (ref 1.5–7.7)
NEUTROPHILS # BLD AUTO: 4.45 X10(3) UL (ref 1.5–7.7)
NEUTROPHILS NFR BLD AUTO: 56 %
OSMOLALITY SERPL CALC.SUM OF ELEC: 293 MOSM/KG (ref 275–295)
PLATELET # BLD AUTO: 291 10(3)UL (ref 150–450)
POTASSIUM SERPL-SCNC: 3.6 MMOL/L (ref 3.5–5.1)
POTASSIUM SERPL-SCNC: 4.4 MMOL/L (ref 3.5–5.1)
RBC # BLD AUTO: 3.35 X10(6)UL
SODIUM SERPL-SCNC: 140 MMOL/L (ref 136–145)
WBC # BLD AUTO: 8 X10(3) UL (ref 4–11)

## 2024-06-17 PROCEDURE — 85730 THROMBOPLASTIN TIME PARTIAL: CPT | Performed by: HOSPITALIST

## 2024-06-17 PROCEDURE — 82962 GLUCOSE BLOOD TEST: CPT

## 2024-06-17 PROCEDURE — 80048 BASIC METABOLIC PNL TOTAL CA: CPT | Performed by: HOSPITALIST

## 2024-06-17 PROCEDURE — 84132 ASSAY OF SERUM POTASSIUM: CPT | Performed by: HOSPITALIST

## 2024-06-17 PROCEDURE — 92526 ORAL FUNCTION THERAPY: CPT

## 2024-06-17 PROCEDURE — 85025 COMPLETE CBC W/AUTO DIFF WBC: CPT | Performed by: HOSPITALIST

## 2024-06-17 PROCEDURE — 83735 ASSAY OF MAGNESIUM: CPT | Performed by: HOSPITALIST

## 2024-06-17 PROCEDURE — 75574 CT ANGIO HRT W/3D IMAGE: CPT

## 2024-06-17 RX ORDER — KETOROLAC TROMETHAMINE 15 MG/ML
15 INJECTION, SOLUTION INTRAMUSCULAR; INTRAVENOUS ONCE
Status: COMPLETED | OUTPATIENT
Start: 2024-06-17 | End: 2024-06-17

## 2024-06-17 RX ORDER — SPIRONOLACTONE 25 MG/1
25 TABLET ORAL DAILY
Status: DISCONTINUED | OUTPATIENT
Start: 2024-06-17 | End: 2024-06-20

## 2024-06-17 RX ORDER — MAGNESIUM OXIDE 400 MG/1
400 TABLET ORAL ONCE
Status: COMPLETED | OUTPATIENT
Start: 2024-06-17 | End: 2024-06-17

## 2024-06-17 RX ORDER — POTASSIUM CHLORIDE 20 MEQ/1
40 TABLET, EXTENDED RELEASE ORAL EVERY 4 HOURS
Status: COMPLETED | OUTPATIENT
Start: 2024-06-17 | End: 2024-06-17

## 2024-06-17 NOTE — IMAGING NOTE
Sandi from room 2626 to CT Rm 4 for gated pulmonary vein study.     Positioned pt on table. Procedure explained and questions answered. Vital signs monitored and noted in Flowsheet.    GFR = 87  Contrast injected followed by saline flush at 09:06  Contrast = 90 ml  0.9 NS flush = 100 ml  HR during scan = 73 BPM     Patient tolerated the procedure without complication. Denies any contrast reaction.     Patient instructed to hydrate well for next 48 hrs to facilitate contrast excretion.     Sandi escorted to back to room 2626 via bed by transport tech.

## 2024-06-17 NOTE — SLP NOTE
SPEECH DAILY NOTE - INPATIENT    ASSESSMENT & PLAN   ASSESSMENT  Patient is an 82 yo woman seen today to monitor current diet tolerance and reinforce aspiration precautions. Patient received alert in bed and tolerating 2L O2 on NC. Patient reported difficulty swallowing a larger pill with a thin liquid last night and that she ended up coughing it up. Vocal quality clear at baseline and following PO trials. Bolus acceptance was adequate without evidence of anterior bolus loss. Mastication and AP transit were thorough and efficient. Pharyngeal swallow initiation appeared timely. No overt s/s aspiration observed. Patient denied odynophagia and globus sensation. Patient independently initiated multiple swallows per bolus and taking small bites and sips.    Recommend patient continue regular diet and thin liquids. Recommend administering medication whole in puree. Recommend patient continue implementing aspiration precautions listed below. No further inpatient SLP services warranted at this time as deficits identified do no require skilled intervention.         Diet Recommendations - Solids: Regular  Diet Recommendations - Liquids: Thin Liquids    Compensatory Strategies Recommended: Slow rate;Small bites and sips;Multiple swallows;Extra sauce/gravy  Aspiration Precautions: Upright position;Slow rate;Small bites and sips  Medication Administration Recommendations: Whole in puree    Patient Experiencing Pain: No                Treatment Plan  Treatment Plan/Recommendations: No further inpatient SLP service warranted    Interdisciplinary Communication: Discussed with RN          GOALS  Goal #1 The patient will tolerate regular consistency and thin liquids without overt signs or symptoms of aspiration with 100 % accuracy over 1 session(s). Met   Goal #2 The patient/family/caregiver will demonstrate understanding and implementation of aspiration precautions and swallow strategies independently over 1 session(s).    Met      FOLLOW UP  Follow Up Needed (Documentation Required): No  SLP Follow-up Date: 06/17/24  Number of Visits to Meet Established Goals: 1    Session: 2    If you have any questions, please contact Gena GARDNER

## 2024-06-17 NOTE — PLAN OF CARE
Assumed care at 0730. Pt alert, oriented x4. Oxygen saturation adequate on 3L nasal cannula. Lung sounds diminished bilaterally. Tele: AFIB, rates controlled. Heparin gtt infusing per protocol. Incontinent. Purewick in place. Toradol, scheduled tylenol for pain. Plan of care: QID, stand/pivot, heparin gtt. Pt updated on plan of care. Questions answered.       Problem: CARDIOVASCULAR - ADULT  Goal: Maintains optimal cardiac output and hemodynamic stability  Description: INTERVENTIONS:  - Monitor vital signs, rhythm, and trends  - Monitor for bleeding, hypotension and signs of decreased cardiac output  - Evaluate effectiveness of vasoactive medications to optimize hemodynamic stability  - Monitor arterial and/or venous puncture sites for bleeding and/or hematoma  - Assess quality of pulses, skin color and temperature  - Assess for signs of decreased coronary artery perfusion - ex. Angina  - Evaluate fluid balance, assess for edema, trend weights  Outcome: Progressing  Goal: Absence of cardiac arrhythmias or at baseline  Description: INTERVENTIONS:  - Continuous cardiac monitoring, monitor vital signs, obtain 12 lead EKG if indicated  - Evaluate effectiveness of antiarrhythmic and heart rate control medications as ordered  - Initiate emergency measures for life threatening arrhythmias  - Monitor electrolytes and administer replacement therapy as ordered  Outcome: Progressing     Problem: RESPIRATORY - ADULT  Goal: Achieves optimal ventilation and oxygenation  Description: INTERVENTIONS:  - Assess for changes in respiratory status  - Assess for changes in mentation and behavior  - Position to facilitate oxygenation and minimize respiratory effort  - Oxygen supplementation based on oxygen saturation or ABGs  - Provide Smoking Cessation handout, if applicable  - Encourage broncho-pulmonary hygiene including cough, deep breathe, Incentive Spirometry  - Assess the need for suctioning and perform as needed  - Assess and  instruct to report SOB or any respiratory difficulty  - Respiratory Therapy support as indicated  - Manage/alleviate anxiety  - Monitor for signs/symptoms of CO2 retention  Outcome: Progressing

## 2024-06-17 NOTE — CDS QUERY
..How to Answer this Query:    1.) DON'T CLICK COSIGN BUTTON FIRST  1.) Click \"3 dots...\" to the right of cosign button and click EDIT on the toolbar  2.) Type an \"X\" in the bracket for the diagnosis that applies. (You may also add additional clinical details as you feel necessary to substantiate your response).   3.) Finally click \"Sign\" to complete response.      Thank you     Congestive Heart Failure- Conflicting Documentation  CLINICAL DOCUMENTATION CLARIFICATION FORM  Dear :  Clinical information (provided below) congestive heart failure:  PLEASE (X) ALL DIAGNOSES THAT APPLY.  SELECTION BY PROVIDER ONLY    (  ) Acute on Chronic Heart Failure (  ) Diastolic  (  ) Systolic  (  ) Combined  ( x ) Other (please specify): acute heart failure with preserved EF      CLINICAL INFORMATION FROM THE MEDICAL RECORD  Clinical Indicators:  6/14 ED provider note:Acute on chronic congestive Heart failure  6/17 PN: SOB/Acute CHF  6/11 Chest Xray  cardiomegaly  atelectasis  Shortness of breath/Dyspnea  BNP 1490    Risk Factors:  Home medication- Furosemide  Obstructive Sleep Apnea  Hypertension  New onset of Atrial Fibrillation    Treatments:  Chest xray  Daily labs  IV Lasix  ECHO  Cardio Consult    If you have any questions, please contact Clinical : Georgette Delgado RN CDS  at [email or web address]    Thank You!    THIS FORM IS A PERMANENT PART OF THE MEDICAL RECORD

## 2024-06-17 NOTE — PROGRESS NOTES
[facility] Cardiology  Progress Note      Sandi Linder Patient Status:  Emergency    1940 MRN [id number]   Location [facility] EMERGENCY DEPARTMENT Attending Lon Bliss MD   Hosp Day # 3 PCP Norm Mendoza MD     Subjective: SOB overnight. Today still 2L O2. Edema better. No CP    History of Present Illness:  Sandi Linder is a 83 year old female who presented to [facility] on 2024 with several days of worsening SOB and orthopnea. Has chronic LE edema on oral lasix at home. No CP. Here in new Afib, rate controlled. Reports mild palpitations for months. No outward s/o bleeding. Feels very unsteady on feet. She is mostly in wheelchair, but gets up to the bathroom on her own and frequently falls with head strike, last one was less than 1 week ago.     Medications:  Current Facility-Administered Medications   Medication Dose Route Frequency    potassium chloride (Klor-Con M20) tab 40 mEq  40 mEq Oral Q4H    spironolactone (Aldactone) tab 25 mg  25 mg Oral Daily    nystatin (Mycostatin) 100,000 Units/g cream   Topical BID    acetaminophen (Tylenol Extra Strength) tab 500 mg  500 mg Oral Q4H PRN    melatonin tab 3 mg  3 mg Oral Nightly PRN    polyethylene glycol (PEG 3350) (Miralax) 17 g oral packet 17 g  17 g Oral Daily PRN    sennosides (Senokot) tab 17.2 mg  17.2 mg Oral Nightly PRN    bisacodyl (Dulcolax) 10 MG rectal suppository 10 mg  10 mg Rectal Daily PRN    fleet enema (Fleet) 7-19 GM/118ML rectal enema 133 mL  1 enema Rectal Once PRN    ondansetron (Zofran) 4 MG/2ML injection 4 mg  4 mg Intravenous Q6H PRN    metoclopramide (Reglan) 5 mg/mL injection 5 mg  5 mg Intravenous Q8H PRN    glucose (Dex4) 15 GM/59ML oral liquid 15 g  15 g Oral Q15 Min PRN    Or    glucose (Glutose) 40% oral gel 15 g  15 g Oral Q15 Min PRN    Or    glucose-vitamin C (Dex-4) chewable tab 4 tablet  4 tablet Oral Q15 Min PRN    Or    dextrose 50% injection 50 mL  50 mL Intravenous Q15 Min PRN    Or     glucose (Dex4) 15 GM/59ML oral liquid 30 g  30 g Oral Q15 Min PRN    Or    glucose (Glutose) 40% oral gel 30 g  30 g Oral Q15 Min PRN    Or    glucose-vitamin C (Dex-4) chewable tab 8 tablet  8 tablet Oral Q15 Min PRN    insulin aspart (NovoLOG) 100 Units/mL FlexPen 1-5 Units  1-5 Units Subcutaneous TID AC and HS    heparin (Porcine) 51821 units/250mL infusion ACS/AFIB CONTINUOUS  200-3,000 Units/hr Intravenous Continuous    furosemide (Lasix) 10 mg/mL injection 40 mg  40 mg Intravenous BID (Diuretic)    amLODIPine (Norvasc) tab 5 mg  5 mg Oral BID    aspirin DR tab 81 mg  81 mg Oral BID    carbidopa-levodopa (SINEMET)  MG per tab 1 tablet  1 tablet Oral TID    gabapentin (Neurontin) cap 300 mg  300 mg Oral BID    umeclidinium bromide (Incruse Ellipta) 62.5 MCG/ACT inhaler 1 puff  1 puff Inhalation Daily    albuterol (Ventolin HFA) 108 (90 Base) MCG/ACT inhaler 1 puff  1 puff Inhalation QID    metoprolol succinate ER (Toprol XL) 24 hr tab 100 mg  100 mg Oral BID    pantoprazole (Protonix) DR tab 40 mg  40 mg Oral QAM AC    sertraline (Zoloft) tab 50 mg  50 mg Oral Daily    atorvastatin (Lipitor) tab 10 mg  10 mg Oral Nightly    prednisoLONE (Pred Forte) 1 % ophthalmic suspension 1 drop  1 drop Left Eye TID & HS    acetaminophen (Tylenol Extra Strength) tab 500 mg  500 mg Oral BID    acetaminophen (Tylenol Extra Strength) tab 1,000 mg  1,000 mg Oral BID       Past Medical History:    C2 cervical fracture (HCC)    C5 vertebral fracture (HCC)    CVA (cerebral infarction)    x2    GERD (gastroesophageal reflux disease)    HTN (hypertension)    SHANA (obstructive sleep apnea)    Osteoarthritis    Overactive bladder    Parkinson's disease (HCC)    Pulmonary hypertension (HCC)    RA (rheumatoid arthritis) (HCC)    Type II or unspecified type diabetes mellitus without mention of complication, not stated as uncontrolled    Unspecified sleep apnea    AHI 78 RDI 80 SaO2 deirdre 85 % CPAP 11  Sleep RX/ now HME    Vitamin D  deficiency    Vitamin D deficiency       Past Surgical History:   Procedure Laterality Date    Cholecystectomy      Hernia surgery      Hip replacement surgery Bilateral     Knee replacement surgery Right     Other surgical history      \"Veins removed\"    Removal of heel spur Right     Tonsillectomy         Family History  family history includes Other in her mother and sister; Psoriasis in her brother; mitral valve prolapse in her sister.    Social History   reports that she has never smoked. She has never used smokeless tobacco. She reports that she does not drink alcohol and does not use drugs.     Allergies  Allergies   Allergen Reactions    Naproxen OTHER (SEE COMMENTS)     headaches    Adhesive Tape RASH     States makes skin \"raw\"    Chocolate RASH    Strawberries RASH       Review of Systems:  As per HPI, otherwise 10 point ROS is negative in detail.    Physical Exam:  Blood pressure 133/65, pulse 75, temperature 97.8 °F (36.6 °C), temperature source Oral, resp. rate 16, height 62\", weight 190 lb (86.2 kg), SpO2 92%, not currently breastfeeding.  Temp (24hrs), Av °F (36.7 °C), Min:97.7 °F (36.5 °C), Max:98.3 °F (36.8 °C)    Wt Readings from Last 3 Encounters:   24 190 lb (86.2 kg)   08/10/23 186 lb (84.4 kg)   23 186 lb (84.4 kg)       General: Awake and alert; in no acute distress  HEENT: Extraocular movements are intact; sclerae are anicteric; scalp is atraumatic  Neck: Supple; no JVD; no carotid bruits  Cardiac: Irreg ireg, variable S1, nl S2, no murmurs, rubs, or gallops are appreciated  Lungs: Bibasilar rales  Abdomen: Soft, non-distended, non-tender; bowel sounds are normoactive  Extremities: Warm, 1-2+ pitting edema b/l LE  Psychiatric: Normal mood and affect; answers questions appropriately  Dermatologic: No rashes; normal skin turgor    Diagnostic testing:    Labs:   Lab Results   Component Value Date    INR 1.27 (H) 2024    INR 1.05 2022        Lab Results   Component  Value Date    WBC 8.0 06/17/2024    HGB 9.2 06/17/2024    HCT 29.4 06/17/2024    .0 06/17/2024    CREATSERUM 0.64 06/17/2024    BUN 14 06/17/2024     06/17/2024    K 3.6 06/17/2024     06/17/2024    CO2 33.0 06/17/2024     06/17/2024    CA 9.7 06/17/2024    PTT 65.5 06/17/2024    MG 1.7 06/17/2024    PGLU 146 06/17/2024       Cardiac diagnostics:    CXR 6/14/2024   Mild cardiomegaly with subsegmental atelectasis at the lung bases.     EKG 6/14/2024:   Atrial fibrillation   Low voltage QRS, consider pulmonary disease, pericardial effusion, or normal variant   Septal infarct (cited on or before 14-JUN-2024)   Abnormal ECG   76 bpm    Echo 5/19/15:  1.  Normal left ventricular size and systolic function.  2.  Mild left atrium enlargement.  3.  Physiologic valvular regurgitation.  4.  Mild pulmonary hypertension.  5.  Diastolic dysfunction.    Impression:  83 year old female presenting with SOB, orthopnea, edema consistent with acute HF    Acute HF  Echo EF 55-60, RVSP 39   Hypoxemic resp failure - 2L O2  Afib - new diagnosis  CHADS VASC ~ 6  HTN - borderline  HLD   SAHNA - CPAP  DM2  Parkinson  Frequent falls - gait instability     Recommendations:  Continue IV lasix 40mg BID. Takes 40mg PO daily at home, may need higher daily maintenance dose. Weight don't appear accurate. Net negative 3.2L  K has been low, add spironolactone   Continue IV heparin for now. She is high stroke risk thus OAC would be indicated, however patient and family are concerned about frequent falls with recurrent head strike. Suspect gait instability due to Parkinson disease and deconditioning. Will need to decide if she is safe enough to take DOAC at discharge. May be candidate for LAAO. CTA ordered for today.   Continue Toprol 100mg BID  Holding home losartan, once adequately diuresed, consider changing this to Entresto

## 2024-06-17 NOTE — PLAN OF CARE
Pt is A&O x4. Reports no pain.   Maintaining O2 on 2L NC. Reports SOB.  Afib on tele. Rates controlled. Denies cardiac symptoms.  Bowel sounds active in all quadrants. Brief and purewick in place.  Pt updated on plan of care. Bed in lowest position. Bed alarm on. Call light within reach.     Problem: CARDIOVASCULAR - ADULT  Goal: Maintains optimal cardiac output and hemodynamic stability  Description: INTERVENTIONS:  - Monitor vital signs, rhythm, and trends  - Monitor for bleeding, hypotension and signs of decreased cardiac output  - Evaluate effectiveness of vasoactive medications to optimize hemodynamic stability  - Monitor arterial and/or venous puncture sites for bleeding and/or hematoma  - Assess quality of pulses, skin color and temperature  - Assess for signs of decreased coronary artery perfusion - ex. Angina  - Evaluate fluid balance, assess for edema, trend weights  Outcome: Progressing  Goal: Absence of cardiac arrhythmias or at baseline  Description: INTERVENTIONS:  - Continuous cardiac monitoring, monitor vital signs, obtain 12 lead EKG if indicated  - Evaluate effectiveness of antiarrhythmic and heart rate control medications as ordered  - Initiate emergency measures for life threatening arrhythmias  - Monitor electrolytes and administer replacement therapy as ordered  Outcome: Progressing     Problem: RESPIRATORY - ADULT  Goal: Achieves optimal ventilation and oxygenation  Description: INTERVENTIONS:  - Assess for changes in respiratory status  - Assess for changes in mentation and behavior  - Position to facilitate oxygenation and minimize respiratory effort  - Oxygen supplementation based on oxygen saturation or ABGs  - Provide Smoking Cessation handout, if applicable  - Encourage broncho-pulmonary hygiene including cough, deep breathe, Incentive Spirometry  - Assess the need for suctioning and perform as needed  - Assess and instruct to report SOB or any respiratory difficulty  - Respiratory  Therapy support as indicated  - Manage/alleviate anxiety  - Monitor for signs/symptoms of CO2 retention  Outcome: Progressing

## 2024-06-17 NOTE — PROGRESS NOTES
[facility] Hospitalist Progress Note                                                                     [facility]   part of [facility]      Sandi Linder  8/2/1940    SUBJECTIVE: no chest pain, palpitations, nausea, vomiting, abdominal pain. Patient sob improved. Still non productive cough. Pt some right hip pain after having her CT scan. Able to move hip. Says pain goes down her leg a bit.     OBJECTIVE:  Temp:  [97.7 °F (36.5 °C)-98.3 °F (36.8 °C)] 97.7 °F (36.5 °C)  Pulse:  [67-77] 70  Resp:  [16-21] 16  BP: (118-146)/(63-76) 146/75  SpO2:  [89 %-95 %] 94 %  Exam  Gen: No acute distress, alert and oriented  Pulm: Lungs clear bilaterally, normal respiratory effort, no crackles, no wheezing  CV: Heart with regular rate and rhythm, no murmur  Abd: Abdomen soft, nontender, non distended, bowel sounds present  MSK: +edema of the LE--> improving, No tenderness of the LE, range of motion intact on exam of right hip, no focal tenderness pf the right hip  Skin: no rashes or lesions    Labs:   Recent Labs   Lab 06/14/24  1148 06/14/24  1149 06/15/24  0840 06/16/24  0623 06/17/24  0644   WBC 8.8  --  8.2 8.8 8.0   HGB 9.1*  --  8.6* 8.9* 9.2*   MCV 89.9  --  90.8 89.9 87.8   .0  --  274.0  274.0 288.0 291.0   INR  --  1.27*  --   --   --        Recent Labs   Lab 06/14/24  1148 06/15/24  0927 06/15/24  1900 06/16/24  0621 06/17/24  0644    141  --  139 140   K 4.6 3.2* 3.8 4.0 3.6    105  --  104 104   CO2 26.0 32.0  --  32.0 33.0*   BUN 20 18  --  15 14   CREATSERUM 0.97 0.78  --  0.70 0.64   CA 9.2 9.3  --  9.2 9.7   MG  --  1.3*  --  1.5* 1.7   * 110*  --  135* 146*       Recent Labs   Lab 06/14/24  1148   ALT 10*   AST 33   ALB 3.3*       Recent Labs   Lab 06/16/24  0526 06/16/24  1222 06/16/24  1605 06/16/24 2122 06/17/24  0521   PGLU 132* 160* 155* 169* 146*       Meds:   Scheduled:    nystatin   Topical BID    insulin aspart  1-5 Units  Subcutaneous TID AC and HS    furosemide  40 mg Intravenous BID (Diuretic)    amLODIPine  5 mg Oral BID    aspirin  81 mg Oral BID    carbidopa-levodopa  1 tablet Oral TID    gabapentin  300 mg Oral BID    umeclidinium bromide  1 puff Inhalation Daily    albuterol  1 puff Inhalation QID    metoprolol succinate ER  100 mg Oral BID    pantoprazole  40 mg Oral QAM AC    sertraline  50 mg Oral Daily    atorvastatin  10 mg Oral Nightly    prednisoLONE  1 drop Left Eye TID & HS    acetaminophen  500 mg Oral BID    acetaminophen  1,000 mg Oral BID     Continuous Infusions:    continuous dose heparin 750 Units/hr (06/17/24 7961)     PRN:   acetaminophen    melatonin    polyethylene glycol (PEG 3350)    sennosides    bisacodyl    fleet enema    ondansetron    metoclopramide    glucose **OR** glucose **OR** glucose-vitamin C **OR** dextrose **OR** glucose **OR** glucose **OR** glucose-vitamin C    ASSESSMENT / PLAN:   Sandi Linder  is a 83 year old female with TDM, parkinsons disease, HTN, HLD, chronic leg edema, SHANA on CPAP, seronegative RA, admitted with SOB.      SOB / Acute CHF  - with elevated BNP, edema, orthopnea, concerning for volume overload   - IV lasix  - cards following  - echo pending--> no acute pathology   - tele  - I/O, daily weights  - wean o2 as able.     New Onset Atrial Fibrillation   - cards following  - echo ordered and pending--> no acute pathology   - monitor on telemetry   - continue beta blocker  - check TSH --> wnl  - heparin drip      HTN / HLD  - resume home meds as ordered      SHANA  - CPAP      Seronegative RA  - receives remicade infusions. Follow up with rheum outpatient      Parkinson's Disease  - cont home carbidopa/levadopa     T2DM  - SSI, accucheks     Right hip Pain  -likely irritation to lower back when moved and laying on surface for imaging.   -order toradol iv x 1, reeval if pain continued  -no imaging at this time as basic range of motion intact and no focal tenderness      VTE ppx:  heparin drip     Dispo: Admitted inpatient. ADOD tbd.      PT is DNR/DNI. OK with vasopressors and anti-arrhythmics, and ICU level care of indicated. No CPR, no cardioversion, no intubation. Discussed with patient and with daughters at bedside.       Daughter Liz is POA if needed.      PCP: Norm Mendoza MD     Concerns regarding plan of care were discussed with patient. Patient agrees with plan as detailed above.     Timi Arreguin MD  [facility] Hospitalist  [phone]

## 2024-06-17 NOTE — CDS QUERY
CLINICAL DOCUMENTATION CLARIFICATION FORM  Dear Dr. Arreguin:  Clinical information (provided below) indicates chronic respiratory failure, please specify type:      ( x ) Acute Respiratory Failure ( x ) Hypoxia  (  ) Hypercapnia    (  ) Acute on Chronic Respiratory Failure (  ) Hypoxia  (  ) Hypercapnia    (  ) Chronic Respiratory Failure  (  ) Hypoxia  (  ) Hypercapnia    (  ) Other (please specify): ____________________________    Clinical Indicators-  6/17 Progress note: Hypoxemic respiratory failure  Shortness of breath/orthopnea/edema  SpO2  6/14@03845  89% RA  6/14@ 2319 88%  NC 2L  6/16@0837 89% NC 3L  6/14 Chest Xray: atelectasis    Risk Factors-  Acute congestive heart failure  Atrial Fibrillation  SHANA- CPAP    Treatment -  Oxygen- nasal cannula  Bipap  Chest xray  Diuresis  Respiratory Therapy            If you have any questions, please contact Clinical : Georgette Delgado RN CDS  at [email or web address]    Thank You!    THIS FORM IS A PERMANENT PART OF THE MEDICAL RECORD

## 2024-06-18 LAB
ANION GAP SERPL CALC-SCNC: 5 MMOL/L (ref 0–18)
APTT PPP: 46.9 SECONDS (ref 23–36)
APTT PPP: 62.4 SECONDS (ref 23–36)
BASOPHILS # BLD AUTO: 0.05 X10(3) UL (ref 0–0.2)
BASOPHILS NFR BLD AUTO: 0.6 %
BUN BLD-MCNC: 17 MG/DL (ref 9–23)
CALCIUM BLD-MCNC: 9.8 MG/DL (ref 8.5–10.1)
CHLORIDE SERPL-SCNC: 103 MMOL/L (ref 98–112)
CO2 SERPL-SCNC: 30 MMOL/L (ref 21–32)
CREAT BLD-MCNC: 0.73 MG/DL
EGFRCR SERPLBLD CKD-EPI 2021: 81 ML/MIN/1.73M2 (ref 60–?)
EOSINOPHIL # BLD AUTO: 0.27 X10(3) UL (ref 0–0.7)
EOSINOPHIL NFR BLD AUTO: 3.5 %
ERYTHROCYTE [DISTWIDTH] IN BLOOD BY AUTOMATED COUNT: 15.9 %
GLUCOSE BLD-MCNC: 136 MG/DL (ref 70–99)
GLUCOSE BLD-MCNC: 137 MG/DL (ref 70–99)
GLUCOSE BLD-MCNC: 147 MG/DL (ref 70–99)
GLUCOSE BLD-MCNC: 161 MG/DL (ref 70–99)
GLUCOSE BLD-MCNC: 169 MG/DL (ref 70–99)
GLUCOSE BLD-MCNC: 175 MG/DL (ref 70–99)
HCT VFR BLD AUTO: 30.1 %
HGB BLD-MCNC: 9.4 G/DL
IMM GRANULOCYTES # BLD AUTO: 0.03 X10(3) UL (ref 0–1)
IMM GRANULOCYTES NFR BLD: 0.4 %
LYMPHOCYTES # BLD AUTO: 2.27 X10(3) UL (ref 1–4)
LYMPHOCYTES NFR BLD AUTO: 29.1 %
MAGNESIUM SERPL-MCNC: 1.7 MG/DL (ref 1.6–2.6)
MCH RBC QN AUTO: 27.6 PG (ref 26–34)
MCHC RBC AUTO-ENTMCNC: 31.2 G/DL (ref 31–37)
MCV RBC AUTO: 88.5 FL
MONOCYTES # BLD AUTO: 1.06 X10(3) UL (ref 0.1–1)
MONOCYTES NFR BLD AUTO: 13.6 %
NEUTROPHILS # BLD AUTO: 4.13 X10 (3) UL (ref 1.5–7.7)
NEUTROPHILS # BLD AUTO: 4.13 X10(3) UL (ref 1.5–7.7)
NEUTROPHILS NFR BLD AUTO: 52.8 %
OSMOLALITY SERPL CALC.SUM OF ELEC: 291 MOSM/KG (ref 275–295)
PLATELET # BLD AUTO: 269 10(3)UL (ref 150–450)
POTASSIUM SERPL-SCNC: 4 MMOL/L (ref 3.5–5.1)
RBC # BLD AUTO: 3.4 X10(6)UL
SODIUM SERPL-SCNC: 138 MMOL/L (ref 136–145)
WBC # BLD AUTO: 7.8 X10(3) UL (ref 4–11)

## 2024-06-18 PROCEDURE — 80048 BASIC METABOLIC PNL TOTAL CA: CPT | Performed by: HOSPITALIST

## 2024-06-18 PROCEDURE — 82962 GLUCOSE BLOOD TEST: CPT

## 2024-06-18 PROCEDURE — 83735 ASSAY OF MAGNESIUM: CPT | Performed by: HOSPITALIST

## 2024-06-18 PROCEDURE — 85730 THROMBOPLASTIN TIME PARTIAL: CPT | Performed by: HOSPITALIST

## 2024-06-18 PROCEDURE — 85025 COMPLETE CBC W/AUTO DIFF WBC: CPT | Performed by: HOSPITALIST

## 2024-06-18 RX ORDER — PREDNISOLONE ACETATE 10 MG/ML
1 SUSPENSION/ DROPS OPHTHALMIC DAILY
Status: DISCONTINUED | OUTPATIENT
Start: 2024-06-19 | End: 2024-06-20

## 2024-06-18 NOTE — PLAN OF CARE
Shift Note:  Assumed care of patient. Patient alert and oriented x4.   Glasses, bilateral hearing aids and U/L dentures at the bedside.   Patient on 2L nasal cannula while awake, requiring 4-8L with sleep,   denies difficulty breathing, lung sounds clear with crackles at lower lobes.   Denies any cardiac symptoms, controlled Afib with HR in the 60s on tele. Denies pain at this time.   Incontinent of bowel and bladder, last BM 6/14.   Ambulates with 2 assist and a pivot, wheelchair bound at baseline, call light within reach, tolerating care well.     POC:  - Hep gtts  - Diurese // Daily weight   - POLST to be signed   - Wean O2 as able           Problem: CARDIOVASCULAR - ADULT  Goal: Maintains optimal cardiac output and hemodynamic stability  Description: INTERVENTIONS:  - Monitor vital signs, rhythm, and trends  - Monitor for bleeding, hypotension and signs of decreased cardiac output  - Evaluate effectiveness of vasoactive medications to optimize hemodynamic stability  - Monitor arterial and/or venous puncture sites for bleeding and/or hematoma  - Assess quality of pulses, skin color and temperature  - Assess for signs of decreased coronary artery perfusion - ex. Angina  - Evaluate fluid balance, assess for edema, trend weights  Outcome: Progressing  Goal: Absence of cardiac arrhythmias or at baseline  Description: INTERVENTIONS:  - Continuous cardiac monitoring, monitor vital signs, obtain 12 lead EKG if indicated  - Evaluate effectiveness of antiarrhythmic and heart rate control medications as ordered  - Initiate emergency measures for life threatening arrhythmias  - Monitor electrolytes and administer replacement therapy as ordered  Outcome: Progressing     Problem: RESPIRATORY - ADULT  Goal: Achieves optimal ventilation and oxygenation  Description: INTERVENTIONS:  - Assess for changes in respiratory status  - Assess for changes in mentation and behavior  - Position to facilitate oxygenation and minimize  respiratory effort  - Oxygen supplementation based on oxygen saturation or ABGs  - Provide Smoking Cessation handout, if applicable  - Encourage broncho-pulmonary hygiene including cough, deep breathe, Incentive Spirometry  - Assess the need for suctioning and perform as needed  - Assess and instruct to report SOB or any respiratory difficulty  - Respiratory Therapy support as indicated  - Manage/alleviate anxiety  - Monitor for signs/symptoms of CO2 retention  Outcome: Progressing

## 2024-06-18 NOTE — PROGRESS NOTES
[facility]  Progress Note      Sandi Linder Patient Status:  Emergency    1940 MRN [id number]   Location [facility] EMERGENCY DEPARTMENT Attending Lon Bliss MD   Hosp Day # 4 PCP Norm Mendoza MD     Subjective: SOB improved. Today still 2L O2. Edema better. No CP    History of Present Illness:  Sandi Linder is a 83 year old female who presented to [facility] on 2024 with several days of worsening SOB and orthopnea. Has chronic LE edema on oral lasix at home. No CP. Here in new Afib, rate controlled. Reports mild palpitations for months. No outward s/o bleeding. Feels very unsteady on feet. She is mostly in wheelchair, but gets up to the bathroom on her own and frequently falls with head strike, last one was less than 1 week ago.     Medications:  Current Facility-Administered Medications   Medication Dose Route Frequency    [START ON 2024] prednisoLONE (Pred Forte) 1 % ophthalmic suspension 1 drop  1 drop Left Eye Daily    spironolactone (Aldactone) tab 25 mg  25 mg Oral Daily    nystatin (Mycostatin) 100,000 Units/g cream   Topical BID    acetaminophen (Tylenol Extra Strength) tab 500 mg  500 mg Oral Q4H PRN    melatonin tab 3 mg  3 mg Oral Nightly PRN    polyethylene glycol (PEG 3350) (Miralax) 17 g oral packet 17 g  17 g Oral Daily PRN    sennosides (Senokot) tab 17.2 mg  17.2 mg Oral Nightly PRN    bisacodyl (Dulcolax) 10 MG rectal suppository 10 mg  10 mg Rectal Daily PRN    fleet enema (Fleet) 7-19 GM/118ML rectal enema 133 mL  1 enema Rectal Once PRN    ondansetron (Zofran) 4 MG/2ML injection 4 mg  4 mg Intravenous Q6H PRN    metoclopramide (Reglan) 5 mg/mL injection 5 mg  5 mg Intravenous Q8H PRN    glucose (Dex4) 15 GM/59ML oral liquid 15 g  15 g Oral Q15 Min PRN    Or    glucose (Glutose) 40% oral gel 15 g  15 g Oral Q15 Min PRN    Or    glucose-vitamin C (Dex-4) chewable tab 4 tablet  4 tablet Oral Q15 Min PRN    Or    dextrose 50% injection 50 mL  50 mL  Intravenous Q15 Min PRN    Or    glucose (Dex4) 15 GM/59ML oral liquid 30 g  30 g Oral Q15 Min PRN    Or    glucose (Glutose) 40% oral gel 30 g  30 g Oral Q15 Min PRN    Or    glucose-vitamin C (Dex-4) chewable tab 8 tablet  8 tablet Oral Q15 Min PRN    insulin aspart (NovoLOG) 100 Units/mL FlexPen 1-5 Units  1-5 Units Subcutaneous TID AC and HS    heparin (Porcine) 07565 units/250mL infusion ACS/AFIB CONTINUOUS  200-3,000 Units/hr Intravenous Continuous    furosemide (Lasix) 10 mg/mL injection 40 mg  40 mg Intravenous BID (Diuretic)    amLODIPine (Norvasc) tab 5 mg  5 mg Oral BID    aspirin DR tab 81 mg  81 mg Oral BID    carbidopa-levodopa (SINEMET)  MG per tab 1 tablet  1 tablet Oral TID    gabapentin (Neurontin) cap 300 mg  300 mg Oral BID    umeclidinium bromide (Incruse Ellipta) 62.5 MCG/ACT inhaler 1 puff  1 puff Inhalation Daily    albuterol (Ventolin HFA) 108 (90 Base) MCG/ACT inhaler 1 puff  1 puff Inhalation QID    metoprolol succinate ER (Toprol XL) 24 hr tab 100 mg  100 mg Oral BID    pantoprazole (Protonix) DR tab 40 mg  40 mg Oral QAM AC    sertraline (Zoloft) tab 50 mg  50 mg Oral Daily    atorvastatin (Lipitor) tab 10 mg  10 mg Oral Nightly    acetaminophen (Tylenol Extra Strength) tab 500 mg  500 mg Oral BID    acetaminophen (Tylenol Extra Strength) tab 1,000 mg  1,000 mg Oral BID       Past Medical History:    C2 cervical fracture (HCC)    C5 vertebral fracture (HCC)    CVA (cerebral infarction)    x2    GERD (gastroesophageal reflux disease)    HTN (hypertension)    SHANA (obstructive sleep apnea)    Osteoarthritis    Overactive bladder    Parkinson's disease (HCC)    Pulmonary hypertension (HCC)    RA (rheumatoid arthritis) (HCC)    Type II or unspecified type diabetes mellitus without mention of complication, not stated as uncontrolled    Unspecified sleep apnea    AHI 78 RDI 80 SaO2 deirdre 85 % CPAP 11  Sleep RX/ now HME    Vitamin D deficiency    Vitamin D deficiency       Past Surgical  History:   Procedure Laterality Date    Cholecystectomy      Hernia surgery      Hip replacement surgery Bilateral     Knee replacement surgery Right     Other surgical history      \"Veins removed\"    Removal of heel spur Right     Tonsillectomy         Family History  family history includes Other in her mother and sister; Psoriasis in her brother; mitral valve prolapse in her sister.    Social History   reports that she has never smoked. She has never used smokeless tobacco. She reports that she does not drink alcohol and does not use drugs.     Allergies  Allergies   Allergen Reactions    Naproxen OTHER (SEE COMMENTS)     headaches    Adhesive Tape RASH     States makes skin \"raw\"    Chocolate RASH    Strawberries RASH       Review of Systems:  As per HPI, otherwise 10 point ROS is negative in detail.    Physical Exam:  Blood pressure 141/78, pulse 71, temperature 98.4 °F (36.9 °C), temperature source Oral, resp. rate 18, height 62\", weight 179 lb 3.7 oz (81.3 kg), SpO2 95%, not currently breastfeeding.  Temp (24hrs), Av.1 °F (36.7 °C), Min:97.5 °F (36.4 °C), Max:98.4 °F (36.9 °C)    Wt Readings from Last 3 Encounters:   24 179 lb 3.7 oz (81.3 kg)   08/10/23 186 lb (84.4 kg)   23 186 lb (84.4 kg)       General: Awake and alert; in no acute distress  HEENT: Extraocular movements are intact; sclerae are anicteric; scalp is atraumatic  Neck: Supple; no JVD; no carotid bruits  Cardiac: Irreg ireg, variable S1, nl S2, no murmurs, rubs, or gallops are appreciated  Lungs: Bibasilar rales  Abdomen: Soft, non-distended, non-tender; bowel sounds are normoactive  Extremities: Warm, 1-2+ pitting edema b/l LE  Psychiatric: Normal mood and affect; answers questions appropriately  Dermatologic: No rashes; normal skin turgor    Diagnostic testing:    Labs:   Lab Results   Component Value Date    INR 1.27 (H) 2024    INR 1.05 2022        Lab Results   Component Value Date    WBC 7.8 2024    HGB  9.4 06/18/2024    HCT 30.1 06/18/2024    .0 06/18/2024    CREATSERUM 0.73 06/18/2024    BUN 17 06/18/2024     06/18/2024    K 4.0 06/18/2024     06/18/2024    CO2 30.0 06/18/2024     06/18/2024    CA 9.8 06/18/2024    PTT 62.4 06/18/2024    MG 1.7 06/18/2024    PGLU 137 06/18/2024       Cardiac diagnostics:    CXR 6/14/2024   Mild cardiomegaly with subsegmental atelectasis at the lung bases.     EKG 6/14/2024:   Atrial fibrillation   Low voltage QRS, consider pulmonary disease, pericardial effusion, or normal variant   Septal infarct (cited on or before 14-JUN-2024)   Abnormal ECG   76 bpm    Echo 5/19/15:  1.  Normal left ventricular size and systolic function.  2.  Mild left atrium enlargement.  3.  Physiologic valvular regurgitation.  4.  Mild pulmonary hypertension.  5.  Diastolic dysfunction.    Impression:  83 year old female presenting with SOB, orthopnea, edema consistent with acute HF    Acute HF  Echo EF 55-60, RVSP 39   Hypoxemic resp failure - 2L O2  Afib - new diagnosis  CHADS VASC ~ 6  HTN - borderline  HLD   SHANA - CPAP  DM2  Parkinson  Frequent falls - gait instability     Recommendations:  Continue IV lasix 40mg BID. Takes 40mg PO daily at home, may need higher daily maintenance dose. Weight don't appear accurate. Net negative 3 L.   Wean O2  Continue spironolactone, K was low now better  Continue IV heparin for now. She is high stroke risk thus OAC would be indicated, however patient and family are concerned about frequent falls with recurrent head strike. Suspect gait instability due to Parkinson disease and deconditioning. Will need to decide if she is safe enough to take DOAC at discharge. May be candidate for LAAO. CTA reviewed, f/u with structural team as outpatient.   Continue Toprol 100mg BID  Holding home losartan, once adequately diuresed, consider changing this to Entresto

## 2024-06-18 NOTE — PROGRESS NOTES
[facility] Hospitalist Progress Note                                                                     [facility]   [facility]      Sandi Linder  8/2/1940    SUBJECTIVE: no chest pain, palpitations, nausea, vomiting, abdominal pain. Patient sob improved. Still non productive cough.    OBJECTIVE:  Temp:  [97.5 °F (36.4 °C)-98.3 °F (36.8 °C)] 97.5 °F (36.4 °C)  Pulse:  [69-81] 77  Resp:  [18-20] 20  BP: (129-161)/(65-87) 130/67  SpO2:  [87 %-98 %] 94 %  FiO2 (%):  [40 %] 40 %  Exam  Gen: No acute distress, alert and oriented  Pulm: Lungs clear bilaterally, normal respiratory effort, no crackles, no wheezing  CV: Heart with regular rate and rhythm, no murmur  Abd: Abdomen soft, nontender, non distended, bowel sounds present  MSK: +edema of the LE--> improving, No tenderness of the LE  Skin: no rashes or lesions    Labs:   Recent Labs   Lab 06/14/24  1148 06/14/24  1149 06/15/24  0840 06/16/24  0623 06/17/24  0644 06/18/24  0642   WBC 8.8  --  8.2 8.8 8.0 7.8   HGB 9.1*  --  8.6* 8.9* 9.2* 9.4*   MCV 89.9  --  90.8 89.9 87.8 88.5   .0  --  274.0  274.0 288.0 291.0 269.0   INR  --  1.27*  --   --   --   --        Recent Labs   Lab 06/14/24  1148 06/15/24  0927 06/15/24  1900 06/16/24  0621 06/17/24  0644 06/17/24  1637 06/18/24  0642    141  --  139 140  --  138   K 4.6 3.2* 3.8 4.0 3.6 4.4 4.0    105  --  104 104  --  103   CO2 26.0 32.0  --  32.0 33.0*  --  30.0   BUN 20 18  --  15 14  --  17   CREATSERUM 0.97 0.78  --  0.70 0.64  --  0.73   CA 9.2 9.3  --  9.2 9.7  --  9.8   MG  --  1.3*  --  1.5* 1.7  --  1.7   * 110*  --  135* 146*  --  161*       Recent Labs   Lab 06/14/24  1148   ALT 10*   AST 33   ALB 3.3*       Recent Labs   Lab 06/17/24  1157 06/17/24  1725 06/17/24  2046 06/18/24  0513 06/18/24  0713   PGLU 171* 160* 156* 136* 169*       Meds:   Scheduled:    spironolactone  25 mg Oral Daily    nystatin   Topical BID     insulin aspart  1-5 Units Subcutaneous TID AC and HS    furosemide  40 mg Intravenous BID (Diuretic)    amLODIPine  5 mg Oral BID    aspirin  81 mg Oral BID    carbidopa-levodopa  1 tablet Oral TID    gabapentin  300 mg Oral BID    umeclidinium bromide  1 puff Inhalation Daily    albuterol  1 puff Inhalation QID    metoprolol succinate ER  100 mg Oral BID    pantoprazole  40 mg Oral QAM AC    sertraline  50 mg Oral Daily    atorvastatin  10 mg Oral Nightly    prednisoLONE  1 drop Left Eye TID & HS    acetaminophen  500 mg Oral BID    acetaminophen  1,000 mg Oral BID     Continuous Infusions:    continuous dose heparin 900 Units/hr (06/18/24 1156)     PRN:   acetaminophen    melatonin    polyethylene glycol (PEG 3350)    sennosides    bisacodyl    fleet enema    ondansetron    metoclopramide    glucose **OR** glucose **OR** glucose-vitamin C **OR** dextrose **OR** glucose **OR** glucose **OR** glucose-vitamin C    ASSESSMENT / PLAN:   Sandi Linder  is a 83 year old female with TDM, parkinsons disease, HTN, HLD, chronic leg edema, SHANA on CPAP, seronegative RA, admitted with SOB.      SOB / Acute CHF  - with elevated BNP, edema, orthopnea, concerning for volume overload   - IV lasix  - cards following  - echo pending--> no acute pathology   - tele  - I/O, daily weights  - wean o2 as able.     New Onset Atrial Fibrillation   - cards following  - echo ordered and pending--> no acute pathology   - monitor on telemetry   - continue beta blocker  - check TSH --> wnl  - heparin drip      HTN / HLD  - resume home meds as ordered      SHANA  - CPAP      Seronegative RA  - receives remicade infusions. Follow up with rheum outpatient      Parkinson's Disease  - cont home carbidopa/levadopa     T2DM  - SSI, accucheks     Right hip Pain--> resolved  -likely irritation to lower back when moved and laying on surface for imaging.   -order toradol iv x 1, reeval if pain continued  -no imaging at this time as basic range of motion intact  and no focal tenderness      VTE ppx: heparin drip     Dispo: dc once off o2 and ok with cardiology      PT is DNR/DNI. OK with vasopressors and anti-arrhythmics, and ICU level care of indicated. No CPR, no cardioversion, no intubation.     Daughter Liz is POA if needed.      PCP: Norm Mendoza MD     Concerns regarding plan of care were discussed with patient. Patient agrees with plan as detailed above.     Timi Arreguin MD  [facility] Hospitalist  [phone]

## 2024-06-18 NOTE — PLAN OF CARE
Pt desatting during the night with 3lnc , increased to 6lnc pt still down in the 80's. Pt is a mouth breather and states she has used the cpap mask in the past but doesn't like it. Pt denies sob . Pt agreeable to trying the ventimask with 8l. Pt O2 sat with mask >90%. Pt frequently pulled off the mask during the night. Pt put back on 3lnc this morning once she was awake. O2 sat >90%.

## 2024-06-19 LAB
ANION GAP SERPL CALC-SCNC: 5 MMOL/L (ref 0–18)
APTT PPP: 56.3 SECONDS (ref 23–36)
APTT PPP: 85.4 SECONDS (ref 23–36)
BASOPHILS # BLD AUTO: 0.07 X10(3) UL (ref 0–0.2)
BASOPHILS NFR BLD AUTO: 1 %
BUN BLD-MCNC: 20 MG/DL (ref 9–23)
CALCIUM BLD-MCNC: 9.6 MG/DL (ref 8.5–10.1)
CHLORIDE SERPL-SCNC: 99 MMOL/L (ref 98–112)
CO2 SERPL-SCNC: 33 MMOL/L (ref 21–32)
CREAT BLD-MCNC: 0.75 MG/DL
EGFRCR SERPLBLD CKD-EPI 2021: 78 ML/MIN/1.73M2 (ref 60–?)
EOSINOPHIL # BLD AUTO: 0.36 X10(3) UL (ref 0–0.7)
EOSINOPHIL NFR BLD AUTO: 5 %
ERYTHROCYTE [DISTWIDTH] IN BLOOD BY AUTOMATED COUNT: 15.6 %
GLUCOSE BLD-MCNC: 141 MG/DL (ref 70–99)
GLUCOSE BLD-MCNC: 143 MG/DL (ref 70–99)
GLUCOSE BLD-MCNC: 168 MG/DL (ref 70–99)
GLUCOSE BLD-MCNC: 169 MG/DL (ref 70–99)
GLUCOSE BLD-MCNC: 178 MG/DL (ref 70–99)
HCT VFR BLD AUTO: 32.2 %
HGB BLD-MCNC: 9.7 G/DL
IMM GRANULOCYTES # BLD AUTO: 0.02 X10(3) UL (ref 0–1)
IMM GRANULOCYTES NFR BLD: 0.3 %
LYMPHOCYTES # BLD AUTO: 2.69 X10(3) UL (ref 1–4)
LYMPHOCYTES NFR BLD AUTO: 37 %
MAGNESIUM SERPL-MCNC: 1.7 MG/DL (ref 1.6–2.6)
MCH RBC QN AUTO: 27 PG (ref 26–34)
MCHC RBC AUTO-ENTMCNC: 30.1 G/DL (ref 31–37)
MCV RBC AUTO: 89.7 FL
MONOCYTES # BLD AUTO: 0.98 X10(3) UL (ref 0.1–1)
MONOCYTES NFR BLD AUTO: 13.5 %
NEUTROPHILS # BLD AUTO: 3.15 X10 (3) UL (ref 1.5–7.7)
NEUTROPHILS # BLD AUTO: 3.15 X10(3) UL (ref 1.5–7.7)
NEUTROPHILS NFR BLD AUTO: 43.2 %
OSMOLALITY SERPL CALC.SUM OF ELEC: 290 MOSM/KG (ref 275–295)
PLATELET # BLD AUTO: 288 10(3)UL (ref 150–450)
POTASSIUM SERPL-SCNC: 3.8 MMOL/L (ref 3.5–5.1)
RBC # BLD AUTO: 3.59 X10(6)UL
SODIUM SERPL-SCNC: 137 MMOL/L (ref 136–145)
WBC # BLD AUTO: 7.3 X10(3) UL (ref 4–11)

## 2024-06-19 PROCEDURE — 85025 COMPLETE CBC W/AUTO DIFF WBC: CPT | Performed by: HOSPITALIST

## 2024-06-19 PROCEDURE — 80048 BASIC METABOLIC PNL TOTAL CA: CPT | Performed by: HOSPITALIST

## 2024-06-19 PROCEDURE — 85730 THROMBOPLASTIN TIME PARTIAL: CPT | Performed by: INTERNAL MEDICINE

## 2024-06-19 PROCEDURE — 83735 ASSAY OF MAGNESIUM: CPT | Performed by: HOSPITALIST

## 2024-06-19 PROCEDURE — 82962 GLUCOSE BLOOD TEST: CPT

## 2024-06-19 PROCEDURE — 85730 THROMBOPLASTIN TIME PARTIAL: CPT | Performed by: HOSPITALIST

## 2024-06-19 RX ORDER — MAGNESIUM OXIDE 400 MG/1
400 TABLET ORAL ONCE
Status: COMPLETED | OUTPATIENT
Start: 2024-06-19 | End: 2024-06-19

## 2024-06-19 RX ORDER — POTASSIUM CHLORIDE 20 MEQ/1
40 TABLET, EXTENDED RELEASE ORAL ONCE
Status: COMPLETED | OUTPATIENT
Start: 2024-06-19 | End: 2024-06-19

## 2024-06-19 RX ORDER — SPIRONOLACTONE 25 MG/1
25 TABLET ORAL DAILY
Qty: 30 TABLET | Refills: 0 | Status: SHIPPED | OUTPATIENT
Start: 2024-06-20 | End: 2024-07-20

## 2024-06-19 RX ORDER — FUROSEMIDE 40 MG/1
40 TABLET ORAL 2 TIMES DAILY
Qty: 60 TABLET | Refills: 0 | Status: SHIPPED | OUTPATIENT
Start: 2024-06-19 | End: 2024-07-19

## 2024-06-19 RX ORDER — NYSTATIN 100000 U/G
1 CREAM TOPICAL 2 TIMES DAILY
Qty: 15 G | Refills: 0 | Status: SHIPPED | OUTPATIENT
Start: 2024-06-19 | End: 2024-06-29

## 2024-06-19 RX ORDER — FUROSEMIDE 40 MG/1
40 TABLET ORAL
Status: DISCONTINUED | OUTPATIENT
Start: 2024-06-19 | End: 2024-06-20

## 2024-06-19 NOTE — CM/SW NOTE
Pt discussed in rounds and chart was reviewed. Per Pt eval, pt would benefit from HH services at [place]. Pt is a resident at [facility].    SW sent referrals for HH in [facility]. SW noted pt has had a history with [facility] HH and added them to the referral list. Awaiting responses.     to remain available for support and/or discharge planning.    PHILLIP Pulliam  Discharge Planner  [phone]

## 2024-06-19 NOTE — DISCHARGE INSTRUCTIONS
HAVE LAB DRAW - BMP - IN ONE WEEK        DIET RECOMMENDATIONS   Diet Recommendations - Solids: Regular  Diet Recommendations - Liquids: Thin Liquids  Compensatory Strategies Recommended: No straws;Slow rate;Small bites and sips;Multiple swallows;Extra sauce/gravy (Slow rate of intake)  Aspiration Precautions: Upright position;Slow rate;Small bites and sips;No straw  Medication Administration Recommendations: No restrictions       Going Home Instructions    In this section you will find the tools which will guide you through the first few days after you leave the hospital. Continued use of these tools will help you develop the skills necessary to keep your heart failure under control.       Home Care Instructions Following Heart Failure - the most important things to do every day include:     Weigh yourself  Take your medicines as prescribed  Limit your sodium (salt) and fluid intake  Know when to call your cardiologist, primary doctor, or nurse  Know when to seek emergency care    Things for You to Remember:   1. See your doctor or healthcare provider.  It is important that you attend this appointment to make sure your symptoms are under control.     2. Your recommended sodium intake is 8466-3857 mg daily    3. Limit your fluid intake to no more than 2 liters or 64 ounces per day    4. Some exercise and activity is important to help keep your heart functioning and strong. Unless instructed not to exercise, you may walk at a slow to moderate pace for 10-15 minutes 2-3 days per week to start. Pace your activity to prevent shortness of breath or fatigue. Stop exercise if you develop chest pain, lightheadedness, or significant shortness of breath.       Call Your Cardiologist If:   You gain 2 pounds overnight or 3-4 pounds in 3-5 days  You have more difficulty breathing  You are getting more tired with normal activity  You are more short of breath lying down, or awaken at night short of breath  You have swelling of your  feet or legs  You urinate less often during the day and more often at night  You have cramps in your legs  You have blurred vision or see yellowish-green halos around objects of lights    Go to the Emergency Room If:   You have pain or tightness in your chest  You are extremely short of breath  You are coughing up pink-frothy mucus  You are traveling and develop symptoms of worsening heart failure      [facility]   Phone: [phone]  Fax: [phone]

## 2024-06-19 NOTE — PLAN OF CARE
Assumed care of pt around 0730  AxO x4, up stand pivot per baseline  Pt currently on 2 L NC, attempt to wean, requiring simple face mask at night due to cpap noncompliance  A fib on tele, HR controlled, no cardiac symptoms  Pt endorses chronic back pain, scheduled tylenol  Heparin gtt infusing per protocol  Incontinent of bladder, purewick in place  Fall precautions in place  Pt updated on plan of care, all needs met at this time    Pt weaned to room air, briefly desat to 80s but recovers quickly on own. However pt mentioning feeling short of breath without the oxygen. Placed back on 2 L NC, MD notified, pt to stay overnight.      Problem: CARDIOVASCULAR - ADULT  Goal: Maintains optimal cardiac output and hemodynamic stability  Description: INTERVENTIONS:  - Monitor vital signs, rhythm, and trends  - Monitor for bleeding, hypotension and signs of decreased cardiac output  - Evaluate effectiveness of vasoactive medications to optimize hemodynamic stability  - Monitor arterial and/or venous puncture sites for bleeding and/or hematoma  - Assess quality of pulses, skin color and temperature  - Assess for signs of decreased coronary artery perfusion - ex. Angina  - Evaluate fluid balance, assess for edema, trend weights  Outcome: Progressing  Goal: Absence of cardiac arrhythmias or at baseline  Description: INTERVENTIONS:  - Continuous cardiac monitoring, monitor vital signs, obtain 12 lead EKG if indicated  - Evaluate effectiveness of antiarrhythmic and heart rate control medications as ordered  - Initiate emergency measures for life threatening arrhythmias  - Monitor electrolytes and administer replacement therapy as ordered  Outcome: Progressing     Problem: RESPIRATORY - ADULT  Goal: Achieves optimal ventilation and oxygenation  Description: INTERVENTIONS:  - Assess for changes in respiratory status  - Assess for changes in mentation and behavior  - Position to facilitate oxygenation and minimize respiratory  effort  - Oxygen supplementation based on oxygen saturation or ABGs  - Provide Smoking Cessation handout, if applicable  - Encourage broncho-pulmonary hygiene including cough, deep breathe, Incentive Spirometry  - Assess the need for suctioning and perform as needed  - Assess and instruct to report SOB or any respiratory difficulty  - Respiratory Therapy support as indicated  - Manage/alleviate anxiety  - Monitor for signs/symptoms of CO2 retention  Outcome: Progressing

## 2024-06-19 NOTE — PROGRESS NOTES
[facility] Cardiology  Progress Note      Sandi Linder Patient Status:  Emergency    1940 MRN [id number]   Location [facility] EMERGENCY DEPARTMENT Attending Lon Bliss MD   Hosp Day # 5 PCP Norm Mendoza MD     Subjective: SOB improved. Per RN still needed 2L O2 overnight, has SHANA. Edema better. No CP    History of Present Illness:  Sandi Linder is a 83 year old female who presented to [facility] on 2024 with several days of worsening SOB and orthopnea. Has chronic LE edema on oral lasix at home. No CP. Here in new Afib, rate controlled. Reports mild palpitations for months. No outward s/o bleeding. Feels very unsteady on feet. She is mostly in wheelchair, but gets up to the bathroom on her own and frequently falls with head strike, last one was less than 1 week ago.     Medications:  Current Facility-Administered Medications   Medication Dose Route Frequency    prednisoLONE (Pred Forte) 1 % ophthalmic suspension 1 drop  1 drop Left Eye Daily    spironolactone (Aldactone) tab 25 mg  25 mg Oral Daily    nystatin (Mycostatin) 100,000 Units/g cream   Topical BID    acetaminophen (Tylenol Extra Strength) tab 500 mg  500 mg Oral Q4H PRN    melatonin tab 3 mg  3 mg Oral Nightly PRN    polyethylene glycol (PEG 3350) (Miralax) 17 g oral packet 17 g  17 g Oral Daily PRN    sennosides (Senokot) tab 17.2 mg  17.2 mg Oral Nightly PRN    bisacodyl (Dulcolax) 10 MG rectal suppository 10 mg  10 mg Rectal Daily PRN    fleet enema (Fleet) 7-19 GM/118ML rectal enema 133 mL  1 enema Rectal Once PRN    ondansetron (Zofran) 4 MG/2ML injection 4 mg  4 mg Intravenous Q6H PRN    metoclopramide (Reglan) 5 mg/mL injection 5 mg  5 mg Intravenous Q8H PRN    glucose (Dex4) 15 GM/59ML oral liquid 15 g  15 g Oral Q15 Min PRN    Or    glucose (Glutose) 40% oral gel 15 g  15 g Oral Q15 Min PRN    Or    glucose-vitamin C (Dex-4) chewable tab 4 tablet  4 tablet Oral Q15 Min PRN    Or    dextrose 50% injection 50 mL   50 mL Intravenous Q15 Min PRN    Or    glucose (Dex4) 15 GM/59ML oral liquid 30 g  30 g Oral Q15 Min PRN    Or    glucose (Glutose) 40% oral gel 30 g  30 g Oral Q15 Min PRN    Or    glucose-vitamin C (Dex-4) chewable tab 8 tablet  8 tablet Oral Q15 Min PRN    insulin aspart (NovoLOG) 100 Units/mL FlexPen 1-5 Units  1-5 Units Subcutaneous TID AC and HS    heparin (Porcine) 11142 units/250mL infusion ACS/AFIB CONTINUOUS  200-3,000 Units/hr Intravenous Continuous    furosemide (Lasix) 10 mg/mL injection 40 mg  40 mg Intravenous BID (Diuretic)    amLODIPine (Norvasc) tab 5 mg  5 mg Oral BID    aspirin DR tab 81 mg  81 mg Oral BID    carbidopa-levodopa (SINEMET)  MG per tab 1 tablet  1 tablet Oral TID    gabapentin (Neurontin) cap 300 mg  300 mg Oral BID    umeclidinium bromide (Incruse Ellipta) 62.5 MCG/ACT inhaler 1 puff  1 puff Inhalation Daily    albuterol (Ventolin HFA) 108 (90 Base) MCG/ACT inhaler 1 puff  1 puff Inhalation QID    metoprolol succinate ER (Toprol XL) 24 hr tab 100 mg  100 mg Oral BID    pantoprazole (Protonix) DR tab 40 mg  40 mg Oral QAM AC    sertraline (Zoloft) tab 50 mg  50 mg Oral Daily    atorvastatin (Lipitor) tab 10 mg  10 mg Oral Nightly    acetaminophen (Tylenol Extra Strength) tab 500 mg  500 mg Oral BID    acetaminophen (Tylenol Extra Strength) tab 1,000 mg  1,000 mg Oral BID       Past Medical History:    C2 cervical fracture (HCC)    C5 vertebral fracture (HCC)    CVA (cerebral infarction)    x2    GERD (gastroesophageal reflux disease)    HTN (hypertension)    SHANA (obstructive sleep apnea)    Osteoarthritis    Overactive bladder    Parkinson's disease (HCC)    Pulmonary hypertension (HCC)    RA (rheumatoid arthritis) (HCC)    Type II or unspecified type diabetes mellitus without mention of complication, not stated as uncontrolled    Unspecified sleep apnea    AHI 78 RDI 80 SaO2 deirdre 85 % CPAP 11  Sleep RX/ now HME    Vitamin D deficiency    Vitamin D deficiency       Past  Surgical History:   Procedure Laterality Date    Cholecystectomy      Hernia surgery      Hip replacement surgery Bilateral     Knee replacement surgery Right     Other surgical history      \"Veins removed\"    Removal of heel spur Right     Tonsillectomy         Family History  family history includes Other in her mother and sister; Psoriasis in her brother; mitral valve prolapse in her sister.    Social History   reports that she has never smoked. She has never used smokeless tobacco. She reports that she does not drink alcohol and does not use drugs.     Allergies  Allergies   Allergen Reactions    Naproxen OTHER (SEE COMMENTS)     headaches    Adhesive Tape RASH     States makes skin \"raw\"    Chocolate RASH    Strawberries RASH       Review of Systems:  As per HPI, otherwise 10 point ROS is negative in detail.    Physical Exam:  Blood pressure 112/64, pulse 70, temperature 98.1 °F (36.7 °C), temperature source Oral, resp. rate 18, height 62\", weight 177 lb 11.1 oz (80.6 kg), SpO2 91%, not currently breastfeeding.  Temp (24hrs), Av.2 °F (36.8 °C), Min:98.1 °F (36.7 °C), Max:98.4 °F (36.9 °C)    Wt Readings from Last 3 Encounters:   24 177 lb 11.1 oz (80.6 kg)   08/10/23 186 lb (84.4 kg)   23 186 lb (84.4 kg)       General: Awake and alert; in no acute distress  HEENT: Extraocular movements are intact; sclerae are anicteric; scalp is atraumatic  Neck: Supple; no JVD; no carotid bruits  Cardiac: Irreg ireg, variable S1, nl S2, no murmurs, rubs, or gallops are appreciated  Lungs: Bibasilar rales  Abdomen: Soft, non-distended, non-tender; bowel sounds are normoactive  Extremities: Warm, 1-2+ pitting edema b/l LE  Psychiatric: Normal mood and affect; answers questions appropriately  Dermatologic: No rashes; normal skin turgor    Diagnostic testing:    Labs:   Lab Results   Component Value Date    INR 1.27 (H) 2024    INR 1.05 2022        Lab Results   Component Value Date    WBC 7.3  06/19/2024    HGB 9.7 06/19/2024    HCT 32.2 06/19/2024    .0 06/19/2024    CREATSERUM 0.75 06/19/2024    BUN 20 06/19/2024     06/19/2024    K 3.8 06/19/2024    CL 99 06/19/2024    CO2 33.0 06/19/2024     06/19/2024    CA 9.6 06/19/2024    PTT 85.4 06/19/2024    MG 1.7 06/19/2024    PGLU 169 06/19/2024       Cardiac diagnostics:    CXR 6/14/2024   Mild cardiomegaly with subsegmental atelectasis at the lung bases.     EKG 6/14/2024:   Atrial fibrillation   Low voltage QRS, consider pulmonary disease, pericardial effusion, or normal variant   Septal infarct (cited on or before 14-JUN-2024)   Abnormal ECG   76 bpm    Echo 5/19/15:  1.  Normal left ventricular size and systolic function.  2.  Mild left atrium enlargement.  3.  Physiologic valvular regurgitation.  4.  Mild pulmonary hypertension.  5.  Diastolic dysfunction.    Impression:  83 year old female presenting with SOB, orthopnea, edema consistent with acute HF    Acute HF  Echo EF 55-60, RVSP 39   Hypoxemic resp failure - 2L O2  Afib - new diagnosis  CHADS VASC ~ 6  HTN - borderline  HLD   SHANA - CPAP  DM2  Parkinson  Frequent falls - gait instability     Recommendations:  Change IV lasix to PO lasix 40mg BID (was on 40mg PO daily at home). Net negative 4.5 L, ~ 10 lb wt loss.   Wean O2. If ok on RA, ok for dc.   Continue spironolactone, K was low now better  Continue IV heparin while inpatient. She is high stroke risk thus OAC would be indicated, however she is not safe to be on long term OAC due to frequent falls with recurrent head strike (they estimate 10 times over last 3 months). Suspect gait instability due to Parkinson disease and deconditioning. May be candidate for LAAO. CTA reviewed. Will arrange f/u with structural team as outpatient.   Continue Toprol 100mg BID  Change home losartan to Entresto. F/u as outpatient for BMP within 1 week.

## 2024-06-19 NOTE — PROGRESS NOTES
[facility] Hospitalist Progress Note                                                                     [facility]   part of [facility]      Sandi Linder  8/2/1940    C c: follow up    SUBJECTIVE:  breathing better. Swelling to legs improved. On 2L NC    OBJECTIVE:  Temp:  [98.1 °F (36.7 °C)-98.4 °F (36.9 °C)] 98.1 °F (36.7 °C)  Pulse:  [67-80] 70  Resp:  [18-19] 18  BP: (112-151)/(61-84) 112/64  SpO2:  [91 %-99 %] 91 %  Exam  Gen: No acute distress, alert and oriented, [place], no accessory m use  Pulm: no wheezing, normal respiratory effort,    CV: Heart with regular rate and rhythm, no murmur  Abd: Abdomen soft, nontender, non distended, bowel sounds present  MSK no edema b/l- wrinkling post diuresis  Skin: no visible  AxOx3, memory ok    Labs:   Recent Labs   Lab 06/14/24  1149 06/15/24  0840 06/16/24  0623 06/17/24  0644 06/18/24  0642 06/19/24  0700   WBC  --  8.2 8.8 8.0 7.8 7.3   HGB  --  8.6* 8.9* 9.2* 9.4* 9.7*   MCV  --  90.8 89.9 87.8 88.5 89.7   PLT  --  274.0  274.0 288.0 291.0 269.0 288.0   INR 1.27*  --   --   --   --   --        Recent Labs   Lab 06/15/24  0927 06/15/24  1900 06/16/24  0621 06/17/24  0644 06/17/24  1637 06/18/24  0642 06/19/24  0700     --  139 140  --  138 137   K 3.2*   < > 4.0 3.6 4.4 4.0 3.8     --  104 104  --  103 99   CO2 32.0  --  32.0 33.0*  --  30.0 33.0*   BUN 18  --  15 14  --  17 20   CREATSERUM 0.78  --  0.70 0.64  --  0.73 0.75   CA 9.3  --  9.2 9.7  --  9.8 9.6   MG 1.3*  --  1.5* 1.7  --  1.7 1.7   *  --  135* 146*  --  161* 168*    < > = values in this interval not displayed.       Recent Labs   Lab 06/14/24  1148   ALT 10*   AST 33   ALB 3.3*       Recent Labs   Lab 06/18/24  1208 06/18/24  1706 06/18/24  2129 06/19/24  0525 06/19/24  1140   PGLU 147* 137* 175* 143* 169*       Meds:   Scheduled:    prednisoLONE  1 drop Left Eye Daily    spironolactone  25 mg Oral Daily    nystatin   Topical  BID    insulin aspart  1-5 Units Subcutaneous TID AC and HS    furosemide  40 mg Intravenous BID (Diuretic)    amLODIPine  5 mg Oral BID    aspirin  81 mg Oral BID    carbidopa-levodopa  1 tablet Oral TID    gabapentin  300 mg Oral BID    umeclidinium bromide  1 puff Inhalation Daily    albuterol  1 puff Inhalation QID    metoprolol succinate ER  100 mg Oral BID    pantoprazole  40 mg Oral QAM AC    sertraline  50 mg Oral Daily    atorvastatin  10 mg Oral Nightly    acetaminophen  500 mg Oral BID    acetaminophen  1,000 mg Oral BID     Continuous Infusions:    continuous dose heparin 750 Units/hr (06/19/24 0920)     PRN:   acetaminophen    melatonin    polyethylene glycol (PEG 3350)    sennosides    bisacodyl    fleet enema    ondansetron    metoclopramide    glucose **OR** glucose **OR** glucose-vitamin C **OR** dextrose **OR** glucose **OR** glucose **OR** glucose-vitamin C    ASSESSMENT / PLAN:   Sandi Linder  is a 83 year old female with TDM, parkinsons disease, HTN, HLD, chronic leg edema, SHANA on CPAP, seronegative RA, admitted with SOB.      SOB / Acute CHF- nearly resolved  - with elevated BNP, edema, orthopnea, concerning for volume overload   - diuresis per cards  - cards following  - echo  --> no acute pathology   - tele  - I/O, daily weights  - wean o2 as able.     New Onset Atrial Fibrillation   - cards following  - echo  --> no acute pathology   - monitor on telemetry   - continue beta blocker  -  TSH --> wnl  - heparin drip --> to dc on asa, considering Watchman given fall risk per cards     HTN / HLD  - resume home meds as ordered      SHANA  - CPAP      Seronegative RA  - receives remicade infusions. Follow up with rheum outpatient      Parkinson's Disease  - cont home carbidopa/levadopa     T2DM  - SSI, accucheks     Right hip Pain--> resolved  -likely irritation to lower back when moved and laying on surface for imaging.   -osp toradol iv x 1, reeval if pain continued  -no imaging at this time as  basic range of motion intact and no focal tenderness      VTE ppx: heparin drip     Dispo: dc once off o2 and ok with cardiology      PT is DNR/DNI. OK with vasopressors and anti-arrhythmics, and ICU level care of indicated. No CPR, no cardioversion, no intubation.    Confirmed with patient     Thank You,  Marilin Ovalle MD    [facility] ERist  Internal Medicine  Answering Service number: [phone]

## 2024-06-20 VITALS
BODY MASS INDEX: 32.7 KG/M2 | HEART RATE: 68 BPM | TEMPERATURE: 98 F | OXYGEN SATURATION: 91 % | SYSTOLIC BLOOD PRESSURE: 104 MMHG | DIASTOLIC BLOOD PRESSURE: 87 MMHG | HEIGHT: 62 IN | WEIGHT: 177.69 LBS | RESPIRATION RATE: 18 BRPM

## 2024-06-20 LAB
APTT PPP: 53.4 SECONDS (ref 23–36)
GLUCOSE BLD-MCNC: 176 MG/DL (ref 70–99)
GLUCOSE BLD-MCNC: 185 MG/DL (ref 70–99)
MAGNESIUM SERPL-MCNC: 1.9 MG/DL (ref 1.6–2.6)
POTASSIUM SERPL-SCNC: 4 MMOL/L (ref 3.5–5.1)

## 2024-06-20 PROCEDURE — 83735 ASSAY OF MAGNESIUM: CPT | Performed by: INTERNAL MEDICINE

## 2024-06-20 PROCEDURE — 82962 GLUCOSE BLOOD TEST: CPT

## 2024-06-20 PROCEDURE — 84132 ASSAY OF SERUM POTASSIUM: CPT | Performed by: INTERNAL MEDICINE

## 2024-06-20 PROCEDURE — 85730 THROMBOPLASTIN TIME PARTIAL: CPT | Performed by: INTERNAL MEDICINE

## 2024-06-20 NOTE — PROGRESS NOTES
NURSING DISCHARGE NOTE    Discharged Home via Ambulance.  Accompanied by Support staff  Belongings Taken by patient/family.    Pt cleared by all services to discharge back to [facility]. Discharge instructions and paperwork taken by patient/ EMS. Report called to receiving RN at [facility], all questions answered. Pt left unit with all belongings, taken by EMS to [facility].

## 2024-06-20 NOTE — PLAN OF CARE
Patient alert and oriented x 4. Pleasant and cooperating well, C Afib  on cardiac monitor. Patient denies any chest pain or chest discomfort at this time. Patient voids,puirwick applied per patient  request ,last BM  6/19. . Plan of care updated, all questions answered. Safety precautions in place. Bed alarm on. Will continue to monitor tele/labs/vital signs closely.     Plan:   Iv lasix to oral, DW,Heparin GTT ,pt ot,am labs  Problem: CARDIOVASCULAR - ADULT  Goal: Maintains optimal cardiac output and hemodynamic stability  Description: INTERVENTIONS:  - Monitor vital signs, rhythm, and trends  - Monitor for bleeding, hypotension and signs of decreased cardiac output  - Evaluate effectiveness of vasoactive medications to optimize hemodynamic stability  - Monitor arterial and/or venous puncture sites for bleeding and/or hematoma  - Assess quality of pulses, skin color and temperature  - Assess for signs of decreased coronary artery perfusion - ex. Angina  - Evaluate fluid balance, assess for edema, trend weights  Outcome: Progressing  Goal: Absence of cardiac arrhythmias or at baseline  Description: INTERVENTIONS:  - Continuous cardiac monitoring, monitor vital signs, obtain 12 lead EKG if indicated  - Evaluate effectiveness of antiarrhythmic and heart rate control medications as ordered  - Initiate emergency measures for life threatening arrhythmias  - Monitor electrolytes and administer replacement therapy as ordered  Outcome: Progressing     Problem: RESPIRATORY - ADULT  Goal: Achieves optimal ventilation and oxygenation  Description: INTERVENTIONS:  - Assess for changes in respiratory status  - Assess for changes in mentation and behavior  - Position to facilitate oxygenation and minimize respiratory effort  - Oxygen supplementation based on oxygen saturation or ABGs  - Provide Smoking Cessation handout, if applicable  - Encourage broncho-pulmonary hygiene including cough, deep breathe, Incentive Spirometry  -  Assess the need for suctioning and perform as needed  - Assess and instruct to report SOB or any respiratory difficulty  - Respiratory Therapy support as indicated  - Manage/alleviate anxiety  - Monitor for signs/symptoms of CO2 retention  Outcome: Progressing     Problem: PAIN - ADULT  Goal: Verbalizes/displays adequate comfort level or patient's stated pain goal  Description: INTERVENTIONS:  - Encourage pt to monitor pain and request assistance  - Assess pain using appropriate pain scale  - Administer analgesics based on type and severity of pain and evaluate response  - Implement non-pharmacological measures as appropriate and evaluate response  - Consider cultural and social influences on pain and pain management  - Manage/alleviate anxiety  - Utilize distraction and/or relaxation techniques  - Monitor for opioid side effects  - Notify MD/LIP if interventions unsuccessful or patient reports new pain  - Anticipate increased pain with activity and pre-medicate as appropriate  Outcome: Progressing     Problem: RISK FOR INFECTION - ADULT  Goal: Absence of fever/infection during anticipated neutropenic period  Description: INTERVENTIONS  - Monitor WBC  - Administer growth factors as ordered  - Implement neutropenic guidelines  Outcome: Progressing     Problem: DISCHARGE PLANNING  Goal: Discharge to home or other facility with appropriate resources  Description: INTERVENTIONS:  - Identify barriers to discharge w/pt and caregiver  - Include patient/family/discharge partner in discharge planning  - Arrange for needed discharge resources and transportation as appropriate  - Identify discharge learning needs (meds, wound care, etc)  - Arrange for interpreters to assist at discharge as needed  - Consider post-discharge preferences of patient/family/discharge partner  - Complete POLST form as appropriate  - Assess patient's ability to be responsible for managing their own health  - Refer to Case Management Department for  coordinating discharge planning if the patient needs post-hospital services based on physician/LIP order or complex needs related to functional status, cognitive ability or social support system  Outcome: Progressing     Problem: Patient/Family Goals  Goal: Patient/Family Long Term Goal  Description: Patient's Long Term Goal: discharge soon back to [facility] with     Interventions:  - Cardiology consult and follow orders , labs,monitor cardiac rhythm ,V/S  Follow up appointments after discharge, watch men procedure, Adhere  medication  regimen, daily weight No recommendations at this time management , Activity as tolerated, fall preventions and safety awareness      - See additional Care Plan goals for specific interventions  Outcome: Progressing  Goal: Patient/Family Short Term Goal  Description: Patient's Short Term Goal: breath comfortable    Interventions:   - frequent assess and assist  in adl ,pain mgt , safety precautions,daily weight, PT OT eval, am labs, lasix therapy, daily weight, strict I&O ,Cardiac life style  - See additional Care Plan goals for specific interventions  Outcome: Progressing

## 2024-06-20 NOTE — CM/SW NOTE
Pt discussed in rounds and chart was reviewed. DAMIEN was informed by RN in rounds that pt is on RA during the day but requires 2L of O2 at night. RN also stated pt has SHANA but does not use CPAP at home as she does like using it and does not currently have one.      DAMIEN received a call from [facility] RN, Adam, requesting information on pt. DAMIEN informed Adam that pt may be getting close to [place] today and will be needing HH services at [place]. Adam informed DAMIEN that pt is currently active with [facility]. DAMIEN stated she would add [facility] to [facility] referral. Adam inquired about pt's O2 needs. DAMIEN informed Adam that pt is on RA during the day but required 2L of O2 at night. Adam stated she would speak with her boss and call back for any further questions.    DAMIEN added [facility] to [facility] referral.     to remain available for support and/or discharge planning.    Addendum: DAMIEN received a call from Georgette at [facility] informing SW that pt does not have O2 set up at the facility and does not have a CPAP either. DAMIEN stated she would need to verify some information regarding O2 needs and would call her back. Indira provided call back number [phone].    DAMIEN discussed pt and night O2 with supervisor, Mini. DAMIEN was informed that since O2 is only needed at night and is due to the SHANA, pt would need to complete an outpatient sleep study to obtain a CPAP since she does not currently have one.    DAMIEN called Georgette at [facility] ([phone]) and informed her of pt's hx of SHANA and that she would need to follow up for an outpatient sleep study to obtain a CPAP. Georgette verbalized understanding this and requested DAMIEN to inform pt's family of this.    Katerin Gaston, [facility]  Discharge Planner  [phone]

## 2024-06-20 NOTE — PROGRESS NOTES
[facility] Cardiology  Progress Note      Sandi Linder Patient Status:  Emergency    1940 MRN [id number]   Location [facility] EMERGENCY DEPARTMENT Attending Lon Bliss MD   Hosp Day # 6 PCP Norm Mendoza MD     Subjective: SOB improved. Edema better. No CP. O2 sats > 90% on RA.     History of Present Illness:  Sandi Linder is a 83 year old female who presented to [facility] on 2024 with several days of worsening SOB and orthopnea. Has chronic LE edema on oral lasix at home. No CP. Here in new Afib, rate controlled. Reports mild palpitations for months. No outward s/o bleeding. Feels very unsteady on feet. She is mostly in wheelchair, but gets up to the bathroom on her own and frequently falls with head strike, last one was less than 1 week ago.     Medications:  Current Facility-Administered Medications   Medication Dose Route Frequency    sacubitril-valsartan (Entresto) 24-26 MG per tab 1 tablet  1 tablet Oral BID    furosemide (Lasix) tab 40 mg  40 mg Oral BID (Diuretic)    prednisoLONE (Pred Forte) 1 % ophthalmic suspension 1 drop  1 drop Left Eye Daily    spironolactone (Aldactone) tab 25 mg  25 mg Oral Daily    nystatin (Mycostatin) 100,000 Units/g cream   Topical BID    acetaminophen (Tylenol Extra Strength) tab 500 mg  500 mg Oral Q4H PRN    melatonin tab 3 mg  3 mg Oral Nightly PRN    polyethylene glycol (PEG 3350) (Miralax) 17 g oral packet 17 g  17 g Oral Daily PRN    sennosides (Senokot) tab 17.2 mg  17.2 mg Oral Nightly PRN    bisacodyl (Dulcolax) 10 MG rectal suppository 10 mg  10 mg Rectal Daily PRN    fleet enema (Fleet) 7-19 GM/118ML rectal enema 133 mL  1 enema Rectal Once PRN    ondansetron (Zofran) 4 MG/2ML injection 4 mg  4 mg Intravenous Q6H PRN    metoclopramide (Reglan) 5 mg/mL injection 5 mg  5 mg Intravenous Q8H PRN    glucose (Dex4) 15 GM/59ML oral liquid 15 g  15 g Oral Q15 Min PRN    Or    glucose (Glutose) 40% oral gel 15 g  15 g Oral Q15 Min PRN    Or     glucose-vitamin C (Dex-4) chewable tab 4 tablet  4 tablet Oral Q15 Min PRN    Or    dextrose 50% injection 50 mL  50 mL Intravenous Q15 Min PRN    Or    glucose (Dex4) 15 GM/59ML oral liquid 30 g  30 g Oral Q15 Min PRN    Or    glucose (Glutose) 40% oral gel 30 g  30 g Oral Q15 Min PRN    Or    glucose-vitamin C (Dex-4) chewable tab 8 tablet  8 tablet Oral Q15 Min PRN    insulin aspart (NovoLOG) 100 Units/mL FlexPen 1-5 Units  1-5 Units Subcutaneous TID AC and HS    heparin (Porcine) 67947 units/250mL infusion ACS/AFIB CONTINUOUS  200-3,000 Units/hr Intravenous Continuous    amLODIPine (Norvasc) tab 5 mg  5 mg Oral BID    aspirin DR tab 81 mg  81 mg Oral BID    carbidopa-levodopa (SINEMET)  MG per tab 1 tablet  1 tablet Oral TID    gabapentin (Neurontin) cap 300 mg  300 mg Oral BID    umeclidinium bromide (Incruse Ellipta) 62.5 MCG/ACT inhaler 1 puff  1 puff Inhalation Daily    albuterol (Ventolin HFA) 108 (90 Base) MCG/ACT inhaler 1 puff  1 puff Inhalation QID    metoprolol succinate ER (Toprol XL) 24 hr tab 100 mg  100 mg Oral BID    pantoprazole (Protonix) DR tab 40 mg  40 mg Oral QAM AC    sertraline (Zoloft) tab 50 mg  50 mg Oral Daily    atorvastatin (Lipitor) tab 10 mg  10 mg Oral Nightly    acetaminophen (Tylenol Extra Strength) tab 500 mg  500 mg Oral BID    acetaminophen (Tylenol Extra Strength) tab 1,000 mg  1,000 mg Oral BID       Past Medical History:    C2 cervical fracture (HCC)    C5 vertebral fracture (HCC)    CVA (cerebral infarction)    x2    GERD (gastroesophageal reflux disease)    HTN (hypertension)    SHANA (obstructive sleep apnea)    Osteoarthritis    Overactive bladder    Parkinson's disease (HCC)    Pulmonary hypertension (HCC)    RA (rheumatoid arthritis) (HCC)    Type II or unspecified type diabetes mellitus without mention of complication, not stated as uncontrolled    Unspecified sleep apnea    AHI 78 RDI 80 SaO2 deirdre 85 % CPAP 11  Sleep RX/ now HME    Vitamin D deficiency     Vitamin D deficiency       Past Surgical History:   Procedure Laterality Date    Cholecystectomy      Hernia surgery      Hip replacement surgery Bilateral     Knee replacement surgery Right     Other surgical history      \"Veins removed\"    Removal of heel spur Right     Tonsillectomy         Family History  family history includes Other in her mother and sister; Psoriasis in her brother; mitral valve prolapse in her sister.    Social History   reports that she has never smoked. She has never used smokeless tobacco. She reports that she does not drink alcohol and does not use drugs.     Allergies  Allergies   Allergen Reactions    Naproxen OTHER (SEE COMMENTS)     headaches    Adhesive Tape RASH     States makes skin \"raw\"    Chocolate RASH    Strawberries RASH       Review of Systems:  As per HPI, otherwise 10 point ROS is negative in detail.    Physical Exam:  Blood pressure 104/87, pulse 68, temperature 97.5 °F (36.4 °C), temperature source Oral, resp. rate 18, height 62\", weight 177 lb 11.1 oz (80.6 kg), SpO2 91%, not currently breastfeeding.  Temp (24hrs), Av °F (36.7 °C), Min:97.5 °F (36.4 °C), Max:98.4 °F (36.9 °C)    Wt Readings from Last 3 Encounters:   24 177 lb 11.1 oz (80.6 kg)   08/10/23 186 lb (84.4 kg)   23 186 lb (84.4 kg)       General: Awake and alert; in no acute distress  HEENT: Extraocular movements are intact; sclerae are anicteric; scalp is atraumatic  Neck: Supple; no JVD; no carotid bruits  Cardiac: Irreg ireg, variable S1, nl S2, no murmurs, rubs, or gallops are appreciated  Lungs: Bibasilar rales  Abdomen: Soft, non-distended, non-tender; bowel sounds are normoactive  Extremities: Warm, 1-2+ pitting edema b/l LE  Psychiatric: Normal mood and affect; answers questions appropriately  Dermatologic: No rashes; normal skin turgor    Diagnostic testing:    Labs:   Lab Results   Component Value Date    INR 1.27 (H) 2024    INR 1.05 2022        Lab Results   Component  Value Date    K 4.0 06/20/2024    PTT 53.4 06/20/2024    MG 1.9 06/20/2024    PGLU 176 06/20/2024       Cardiac diagnostics:    CXR 6/14/2024   Mild cardiomegaly with subsegmental atelectasis at the lung bases.     EKG 6/14/2024:   Atrial fibrillation   Low voltage QRS, consider pulmonary disease, pericardial effusion, or normal variant   Septal infarct (cited on or before 14-JUN-2024)   Abnormal ECG   76 bpm    Echo 5/19/15:  1.  Normal left ventricular size and systolic function.  2.  Mild left atrium enlargement.  3.  Physiologic valvular regurgitation.  4.  Mild pulmonary hypertension.  5.  Diastolic dysfunction.    Impression:  83 year old female presenting with SOB, orthopnea, edema consistent with acute HF    Acute HF  Echo EF 55-60, RVSP 39   Hypoxemic resp failure - 2L O2  Afib - new diagnosis  CHADS VASC ~ 6  HTN - borderline  HLD   SHANA - CPAP  DM2  Parkinson  Frequent falls - gait instability     Recommendations:  S/p IV lasix, now PO lasix 40mg BID (was on 40mg PO daily at home). Net negative 4L, ~ 10 lb wt loss.   Continue spironolactone, K was low now better  Continue IV heparin while inpatient. She is high stroke risk thus OAC would be indicated, however she is not safe to be on long term OAC due to frequent falls with recurrent head strike (they estimate 10 times over last 3 months). Suspect gait instability due to Parkinson disease and deconditioning. May be candidate for LAAO. CTA reviewed. Will arrange f/u with structural team as outpatient.   Continue Toprol 100mg BID  Change home losartan to Entresto. F/u as outpatient for BMP within 1 week.   Ok for dc from my standpoint.

## 2024-06-20 NOTE — PLAN OF CARE
Assumed care of pt around 0730  AxO x4, up stand pivot per baseline  Pt R/A during the day, 2 L NC at night due to cpap noncompliance  A fib on tele, HR controlled, no cardiac symptoms  Pt endorses chronic back pain, scheduled tylenol  Heparin gtt infusing per protocol  Incontinent of bladder, purewick in place  Fall precautions in place  Pt updated on plan of care, all needs met at this time                   Problem: CARDIOVASCULAR - ADULT  Goal: Maintains optimal cardiac output and hemodynamic stability  Description: INTERVENTIONS:  - Monitor vital signs, rhythm, and trends  - Monitor for bleeding, hypotension and signs of decreased cardiac output  - Evaluate effectiveness of vasoactive medications to optimize hemodynamic stability  - Monitor arterial and/or venous puncture sites for bleeding and/or hematoma  - Assess quality of pulses, skin color and temperature  - Assess for signs of decreased coronary artery perfusion - ex. Angina  - Evaluate fluid balance, assess for edema, trend weights  Outcome: Progressing  Goal: Absence of cardiac arrhythmias or at baseline  Description: INTERVENTIONS:  - Continuous cardiac monitoring, monitor vital signs, obtain 12 lead EKG if indicated  - Evaluate effectiveness of antiarrhythmic and heart rate control medications as ordered  - Initiate emergency measures for life threatening arrhythmias  - Monitor electrolytes and administer replacement therapy as ordered  Outcome: Progressing     Problem: RESPIRATORY - ADULT  Goal: Achieves optimal ventilation and oxygenation  Description: INTERVENTIONS:  - Assess for changes in respiratory status  - Assess for changes in mentation and behavior  - Position to facilitate oxygenation and minimize respiratory effort  - Oxygen supplementation based on oxygen saturation or ABGs  - Provide Smoking Cessation handout, if applicable  - Encourage broncho-pulmonary hygiene including cough, deep breathe, Incentive Spirometry  - Assess the need for  suctioning and perform as needed  - Assess and instruct to report SOB or any respiratory difficulty  - Respiratory Therapy support as indicated  - Manage/alleviate anxiety  - Monitor for signs/symptoms of CO2 retention  Outcome: Progressing     Problem: PAIN - ADULT  Goal: Verbalizes/displays adequate comfort level or patient's stated pain goal  Description: INTERVENTIONS:  - Encourage pt to monitor pain and request assistance  - Assess pain using appropriate pain scale  - Administer analgesics based on type and severity of pain and evaluate response  - Implement non-pharmacological measures as appropriate and evaluate response  - Consider cultural and social influences on pain and pain management  - Manage/alleviate anxiety  - Utilize distraction and/or relaxation techniques  - Monitor for opioid side effects  - Notify MD/LIP if interventions unsuccessful or patient reports new pain  - Anticipate increased pain with activity and pre-medicate as appropriate  Outcome: Progressing     Problem: RISK FOR INFECTION - ADULT  Goal: Absence of fever/infection during anticipated neutropenic period  Description: INTERVENTIONS  - Monitor WBC  - Administer growth factors as ordered  - Implement neutropenic guidelines  Outcome: Progressing     Problem: DISCHARGE PLANNING  Goal: Discharge to home or other facility with appropriate resources  Description: INTERVENTIONS:  - Identify barriers to discharge w/pt and caregiver  - Include patient/family/discharge partner in discharge planning  - Arrange for needed discharge resources and transportation as appropriate  - Identify discharge learning needs (meds, wound care, etc)  - Arrange for interpreters to assist at discharge as needed  - Consider post-discharge preferences of patient/family/discharge partner  - Complete POLST form as appropriate  - Assess patient's ability to be responsible for managing their own health  - Refer to Case Management Department for coordinating discharge  planning if the patient needs post-hospital services based on physician/LIP order or complex needs related to functional status, cognitive ability or social support system  Outcome: Progressing     Problem: Patient/Family Goals  Goal: Patient/Family Long Term Goal  Description: Patient's Long Term Goal: discharge soon back to [facility] with     Interventions:  - Cardiology consult and follow orders , labs,monitor cardiac rhythm ,V/S  Follow up appointments after discharge, watch men procedure, Adhere  medication  regimen, daily weight No recommendations at this time management , Activity as tolerated, fall preventions and safety awareness      - See additional Care Plan goals for specific interventions  Outcome: Progressing  Goal: Patient/Family Short Term Goal  Description: Patient's Short Term Goal: breath comfortable    Interventions:   - frequent assess and assist  in adl ,pain mgt , safety precautions,daily weight, PT OT eval, am labs, lasix therapy, daily weight, strict I&O ,Cardiac life style  - See additional Care Plan goals for specific interventions  Outcome: Progressing

## 2024-06-20 NOTE — PROGRESS NOTES
[facility] Hospitalist Progress Note                                                                     [facility]   [facility] of [facility]      Sandi Linder  8/2/1940    C c: follow up    SUBJECTIVE:  pt sitting up in bed, on RA but wearing NC.  Remained in room to monitor O2 sats while she was talking-- with good waveform, POX range was 91-93 during this time.  With poor waveform (inaccurate)- would go to high 80s transiently--but upon good waveform, POX back to 90s. Suspect this was happening yesterday as well    OBJECTIVE:  Temp:  [97.6 °F (36.4 °C)-98.4 °F (36.9 °C)] 97.6 °F (36.4 °C)  Pulse:  [58-79] 58  Resp:  [16-18] 18  BP: (107-149)/(63-78) 107/63  SpO2:  [84 %-95 %] 92 %  Exam  Gen: No acute distress, alert and oriented, [place], no accessory m use  Pulm: no wheezing, normal respiratory effort,    CV: Heart with regular rate and rhythm, no murmur  Abd: Abdomen soft, nontender, non distended, bowel sounds present  MSK no edema b/l- wrinkling post diuresis  Skin: no visible  AxOx3, memory ok    Labs:   Recent Labs   Lab 06/14/24  1149 06/15/24  0840 06/16/24  0623 06/17/24  0644 06/18/24  0642 06/19/24  0700   WBC  --  8.2 8.8 8.0 7.8 7.3   HGB  --  8.6* 8.9* 9.2* 9.4* 9.7*   MCV  --  90.8 89.9 87.8 88.5 89.7   PLT  --  274.0  274.0 288.0 291.0 269.0 288.0   INR 1.27*  --   --   --   --   --        Recent Labs   Lab 06/15/24  0927 06/15/24  1900 06/16/24  0621 06/17/24  0644 06/17/24  1637 06/18/24  0642 06/19/24  0700 06/20/24  0704     --  139 140  --  138 137  --    K 3.2*   < > 4.0 3.6 4.4 4.0 3.8 4.0     --  104 104  --  103 99  --    CO2 32.0  --  32.0 33.0*  --  30.0 33.0*  --    BUN 18  --  15 14  --  17 20  --    CREATSERUM 0.78  --  0.70 0.64  --  0.73 0.75  --    CA 9.3  --  9.2 9.7  --  9.8 9.6  --    MG 1.3*  --  1.5* 1.7  --  1.7 1.7 1.9   *  --  135* 146*  --  161* 168*  --     < > = values in this interval not displayed.        Recent Labs   Lab 06/14/24  1148   ALT 10*   AST 33   ALB 3.3*       Recent Labs   Lab 06/19/24  0525 06/19/24  1140 06/19/24  1702 06/19/24  2222 06/20/24  0601   PGLU 143* 169* 141* 178* 176*       Meds:   Scheduled:    sacubitril-valsartan  1 tablet Oral BID    furosemide  40 mg Oral BID (Diuretic)    prednisoLONE  1 drop Left Eye Daily    spironolactone  25 mg Oral Daily    nystatin   Topical BID    insulin aspart  1-5 Units Subcutaneous TID AC and HS    amLODIPine  5 mg Oral BID    aspirin  81 mg Oral BID    carbidopa-levodopa  1 tablet Oral TID    gabapentin  300 mg Oral BID    umeclidinium bromide  1 puff Inhalation Daily    albuterol  1 puff Inhalation QID    metoprolol succinate ER  100 mg Oral BID    pantoprazole  40 mg Oral QAM AC    sertraline  50 mg Oral Daily    atorvastatin  10 mg Oral Nightly    acetaminophen  500 mg Oral BID    acetaminophen  1,000 mg Oral BID     Continuous Infusions:    continuous dose heparin 750 Units/hr (06/19/24 8846)     PRN:   acetaminophen    melatonin    polyethylene glycol (PEG 3350)    sennosides    bisacodyl    fleet enema    ondansetron    metoclopramide    glucose **OR** glucose **OR** glucose-vitamin C **OR** dextrose **OR** glucose **OR** glucose **OR** glucose-vitamin C    ASSESSMENT / PLAN:   Sandi Linder  is a 83 year old female with TDM, parkinsons disease, HTN, HLD, chronic leg edema, SHANA on CPAP, seronegative RA, admitted with SOB.      SOB / Acute CHF- nearly resolved  - with elevated BNP, edema, orthopnea, concerning for volume overload   - diuresis per cards  - cards following  - echo  --> no acute pathology   - tele  - I/O, daily weights  - wean o2 as able.     New Onset Atrial Fibrillation   - cards following  - echo  --> no acute pathology   - monitor on telemetry   - continue beta blocker  -  TSH --> wnl  - heparin drip --> to dc on asa, considering Watchman given fall risk per cards     HTN / HLD  - resume home meds as ordered      SHANA  - CPAP       Seronegative RA  - receives remicade infusions. Follow up with rheum outpatient      Parkinson's Disease  - cont home carbidopa/levadopa     T2DM  - SSI, accucheks     Right hip Pain--> resolved  -likely irritation to lower back when moved and laying on surface for imaging.   -osp toradol iv x 1, reeval if pain continued  -no imaging at this time as basic range of motion intact and no focal tenderness      VTE ppx: heparin drip     Dispo: pt satting 91-93 on RA with good waveform. Stable for dc from IM standpoint if ok with cards and dc plans in place     PT is DNR/DNI. OK with vasopressors and anti-arrhythmics, and ICU level care of indicated. No CPR, no cardioversion, no intubation.    Confirmed with patient     Thank You,  Marilin Ovalle MD    [facility]  [facility]  Answering Service number: [phone]

## 2024-06-21 NOTE — DISCHARGE SUMMARY
General Medicine Discharge Summary     Patient ID:  Sandi Linder  83 year old  [id number]  8/2/1940    Admit date: 6/14/2024    Discharge date and time: 6/20/2024  2:50 PM     Attending Physician:  Marilin Ovalle MD    Primary Care Physician: Norm Mendoza MD     Reason for admission: sob      Risk of readmission: Sandi Linder has Moderate Risk of readmission after discharge from the hospital.    Hospital Discharge Diagnoses:  CHF, new onset afib    Lace+ Score: 73  59-90 High Risk  29-58 Medium Risk  0-28   Low Risk.    TCM Follow-Up Recommendation:  LACE 29-58: Moderate Risk of readmission after discharge from the hospital.          Discharge Condition: alive    Important follow up  -PCP Norm Mendoza MD see appointments listed below    -Labs: prn  -Radiology:  prn       Hospital Course:    Sandi Linder  is a 83 year old female with TDM, parkinsons disease, HTN, HLD, chronic leg edema, SHANA on CPAP, seronegative RA, admitted with SOB.      SOB / Acute CHF- nearly resolved  - with elevated BNP, edema, orthopnea, concerning for volume overload   - diuresis per cards  - cards following  - echo  --> no acute pathology   - tele  - I/O, daily weights  - wean o2 as able.     New Onset Atrial Fibrillation   - cards following  - echo  --> no acute pathology   - monitor on telemetry   - continue beta blocker  -  TSH --> wnl  - heparin drip --> to dc on asa, considering Watchman given fall risk per cards     HTN / HLD  - resume home meds as ordered      SHANA  - CPAP      Seronegative RA  - receives remicade infusions. Follow up with rheum outpatient      Parkinson's Disease  - cont home carbidopa/levadopa     T2DM  - SSI, accucheks      Right hip Pain--> resolved  -likely irritation to lower back when moved and laying on surface for imaging.   -osp toradol iv x 1, reeval if pain continued  -no imaging at this time as basic range of motion intact and no focal tenderness      Consults: IP CONSULT TO CARDIOLOGY  IP CONSULT  TO SOCIAL WORK    Radiology:  CTA GATED PULMONARY VEINS WO CALCIUM SCORE (CPT=75574)    Result Date: 6/18/2024  Table formatting from the original result was not included. Images from the original result were not included. PROCEDURE: CTA GATED PULMONARY VEINS WO CALCIUM SCORE (CPT=75574) DATE: 6/17/24 COMPARISON: XR DEXA BONE DENSITY AXIAL (CPT=77080) 11/16/2017 [id number] Final  INDICATIONS:   None TECHNIQUE: The patient was placed supine within the [employer] CT scanner.  Axial sections were obtained after administration of intravenous contrast. Post-processing on an independent workstation ([employer]) including maximum intensity projections and 3D volume rendering was performed. A narrow field of view was used to maximize cardiovascular imaging. Please see the radiologist over-read for evaluation of non-cardiac structures. Dose reduction techniques were used. Dose information is transmitted to the ACR (American College of Radiology) NRDR (National Radiology Data Registry) which includes the Dose Index Registry. STUDY QUALITY:  Good LIMITATIONS:    None FINDINGS: RIGHT ATRIUM: Systemic venous return to the right atrium is normal. The interatrial septum is intact. The fossa ovalis is normal in size. LEFT ATRIUM: Pulmonary venous return to the left atrium appears normal. Four pulmonary veins are noted: right superior, right inferior, left superior and left inferior. 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  There is no thrombus in the apex. The maximal diameter at the left atrial appendage ostium is 28.9; the average diameter at the left atrial appendage ostium is 26.7. The appendage depth distal to the landing zone is 17.3 mm. Based upon these dimensions, left atrial appendage occlusion is BORDERLINE with a 35 mm Watchman FLX device using a single FXD curve delivery sheath due to limited depth. A 34 mm Amulet device appears more favorable.  The predicted fluoroscopic angle for  optimal implantation is 35 degrees of ANDREWS and 12 degrees of caudal angulation. Transseptal puncture is recommended with a mid-inferior position along the superior-inferior axis, and a mid position along the anterior-posterior axis. RIGHT VENTRICLE: The right ventricle appears normal in size. LEFT VENTRICLE: The left ventricle appears normal in size.  AORTIC VALVE: There is no aortic valve calcification. MITRAL VALVE: There is no mitral annular calcification. AORTIC ROOT: The aortic valve annulus is normal in size. The sinuses of Valsalva are normal in size. There is no calcification of the sinotubular junction. ASCENDING AORTA: The ascending aorta is normal in size. There is no calcification of the ascending aorta. AORTIC ARCH: The aortic arch is not visualized due to a limited field of view. DESCENDING AORTA: The descending thoracic aorta is normal in size. There is moderate calcification of the descending thoracic aorta. LEFT MAIN CORONARY ARTERY (LM):  The left main coronary artery originates normally from the left coronary cusp. It is a normal sized vessel. It is free of angiographic stenosis. LEFT ANTERIOR DESCENDING CORONARY ARTERY (LAD):  The left anterior descending coronary artery is a large vessel. It gives off first and second diagonal branches. There is heavy calcification of the proximal and mid LAD which prevents assessment of angiographic stenosis. LEFT CIRCUMFLEX CORONARY ARTERY (LCx):  The left circumflex is a moderate-sized, non-dominant vessel. It gives off a first obtuse marginal branch. It terminates as a second obtuse marginal branch. There is nonobstructive (<25%) stenosis involving the proximal Lcx. RIGHT CORONARY ARTERY (RCA):  The right coronary artery originates normally from the right coronary cusp. It is a large, dominant vessel. It is free of angiographic stenosis.  CONCLUSIONS: 1. 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  There is no thrombus in the apex. The maximal diameter  at the left atrial appendage ostium is 28.9; the average diameter at the left atrial appendage ostium is 26.7. The appendage depth distal to the landing zone is 17.3 mm. Based upon these dimensions, left atrial appendage occlusion is BORDERLINE with a 35 mm Watchman FLX device using a single FXD curve delivery sheath due to limited depth. A 34 mm Amulet device appears more favorable.  2. The predicted fluoroscopic angle for optimal implantation is 35 degrees of ANDREWS and 12 degrees of caudal angulation. 3. Transseptal puncture is recommended with a mid-inferior position along the superior-inferior axis, and a mid position along the anterior-posterior axis. 4. Please refer to the radiologist's interpretation of non-cardiac structures. Chaparro Iverson MD 6/18/2024 6:13 AM    CT CARDIAC OVER READ    Result Date: 6/17/2024  This is an over read for the non-cardiac, non-vascular limited visualized portions of the chest and abdomen.  PROCEDURE:  CT CARDIAC OVER READ  COMPARISON:  None.  INDICATIONS:  over read  TECHNIQUE:  CT images of the chest were obtained as part of a cardiac CT evaluation.  Please refer to Cardiologist report for contrast volume and technique.  Dose reduction techniques were used. Dose information is transmitted to the ACR (American College of Radiology) NRDR (National Radiology Data Registry) which includes the Dose Index Registry.  PATIENT STATED HISTORY: (As transcribed by Technologist)     FINDINGS:  LUNGS:  Soft tissue attenuation adjacent to pleural effusions is most consistent with compressive atelectasis, consolidation cannot be excluded.  NATALIA:  No mass or adenopathy.  MEDIASTINUM:  No mass or adenopathy.  PLEURA:  Bilateral pleural effusions measure 2.9 cm in depth on the left and 1.3 cm in depth on the right.  CHEST WALL:  No mass or axillary adenopathy.  LIMITED ABDOMEN:  Limited images of the upper abdomen are unremarkable.  BONES:  Anterior flowing osteophytes throughout  visualized thoracic spine suggest DISH, correlate clinically.  No fracture.   This is an over read for the non-cardiac, non-vascular limited visualized portions of the chest and abdomen. This exam was not performed as a complete diagnostic CT of the chest. Please see the cardiologists' dictation which is reported separately.            CONCLUSION:  1. Small bilateral pleural effusions, left greater than right with compressive atelectasis/consolidation, correlate clinically.    LOCATION:  [facility]    Dictated by (CST): Sary Landry MD on 2024 at 10:32 AM     Finalized by (CST): Sary Landry MD on 2024 at 10:34 AM       CARD ECHO 2D DOPPLER (CPT=93306)    Result Date: 6/15/2024  Transthoracic Echocardiogram Name:Sandi Linder Date: 06/15/2024 :  1940 Ht:  (62in)  BP: 119 / 62 MRN:  [id number]    Age:  83years    Wt:  (191lb) HR: 77bpm Loc:  [facility]       Gndr: F          BSA: 1.87m^2 Sonographer: Josefina LOPEZ Ordering:    Christopher Horton Consulting:  Aure Ordonez ---------------------------------------------------------------------------- History/Indications:  Difficulty in breathing. ---------------------------------------------------------------------------- Procedure information:  A transthoracic complete 2D study was performed. Additional evaluation included M-mode, complete spectral Doppler, and color Doppler.  Patient status:  Inpatient.  Location:  [place] [address].    Comparison was made to the study of 2015.    This was a routine study. Transthoracic echocardiography for ventricular function evaluation and assessment of valvular function. Image quality was adequate. ECG rhythm:   Atrial fibrillation ---------------------------------------------------------------------------- Conclusions: 1. Left ventricle: The cavity size was normal. Wall thickness was normal.    Systolic function was normal. The estimated ejection fraction was 55-60%.    No diagnostic evidence for regional wall motion abnormalities. Unable  to    assess LV diastolic function due to heart rhythm. 2. Left atrium: The left atrial volume was mildly increased. 3. Right atrium: The atrium was mildly dilated. 4. Mitral valve: There was mild regurgitation. 5. Pulmonary arteries: The peak systolic pressure is 39mm Hg. * ---------------------------------------------------------------------------- * Findings: Left ventricle:  The cavity size was normal. Wall thickness was normal. Systolic function was normal. The estimated ejection fraction was 55-60%. No diagnostic evidence for regional wall motion abnormalities. Unable to assess LV diastolic function due to heart rhythm. Left atrium:  The left atrial volume was mildly increased. Right ventricle:  The cavity size was normal. Systolic function was normal. Right atrium:  The atrium was mildly dilated. Mitral valve:  The leaflets were mildly thickened. Leaflet separation was normal.  Doppler:  Transvalvular velocity was within the normal range. There was no evidence for stenosis. There was mild regurgitation. Aortic valve:  The valve was structurally normal. The valve was trileaflet. Cusp separation was normal.  Doppler:  Transvalvular velocity was within the normal range. There was no evidence for stenosis. There was no significant regurgitation. Tricuspid valve:  The valve is structurally normal. Leaflet separation was normal.  Doppler:  Transvalvular velocity was within the normal range. There was no evidence for stenosis. There was mild regurgitation. Pulmonic valve:   The valve is structurally normal. Cusp separation was normal.  Doppler:  Transvalvular velocity was within the normal range. There was no evidence for stenosis. There was trivial regurgitation. Pericardium:   There was no pericardial effusion. Aorta: Aortic root: The aortic root was normal. Ascending aorta: The ascending aorta was normal. Pulmonary arteries: Systolic pressure was estimated to be 39mm Hg. Estimated pulmonary artery diastolic  pressure was 7mm Hg. Systemic veins:  Central venous respirophasic diameter changes are in the normal range (>50%). ---------------------------------------------------------------------------- Measurements  Left ventricle                    Value        Ref  IVS thickness, ED, PLAX           0.9   cm     0.6 -                                                 0.9  LV ID, ED, PLAX                   4.6   cm     3.8 -                                                 5.2  LV ID, ES, PLAX                   3.1   cm     2.2 -                                                 3.5  LV PW thickness, ED, PLAX     (H) 1.0   cm     0.6 -                                                 0.9  IVS/LV PW ratio, ED, PLAX         0.84         --------  LV PW/LV ID ratio, ED, PLAX       0.23         --------  LV ejection fraction              59    %      54 - 74  Aortic root                       Value        Ref  Aortic root ID, ED                2.8   cm     2.6 -                                                 4.0  Ascending aorta                   Value        Ref  Ascending aorta ID, A-P, ED       3.2   cm     1.9 -                                                 3.5  Left atrium                       Value        Ref  LA ID, A-P, ES                (H) 4.7   cm     2.7 -                                                 3.8  LA volume, S                  (H) 66    ml     22 - 52  LA volume/bsa, S              (H) 35    ml/m^2 16 - 34  LA volume, ES, 1-p A4C        (H) 65    ml     22 - 52  LA volume, ES, 1-p A2C        (H) 64    ml     22 - 52  LA volume, ES, A/L                70    ml     --------  LA volume/bsa, ES, A/L        (H) 37    ml/m^2 16 - 34  LA/aortic root ratio              1.68         --------  Pulmonary artery                  Value        Ref  PA pressure, S, DP                39    mm Hg  --------  PA pressure, ED, DP               7     mm Hg  --------  Tricuspid valve                   Value        Ref  Tricuspid  regurg peak         (H) 2.93  m/sec  <=2.8  velocity  Tricuspid peak RV-RA gradient     34    mm Hg  --------  Systemic veins                    Value        Ref  Estimated CVP                     5     mm Hg  --------  Right ventricle                   Value        Ref  RV pressure, S, DP                39    mm Hg  --------  Pulmonic valve                    Value        Ref  Pulmonic regurg velocity, ED      0.69  m/sec  --------  Pulmonic regurg gradient, ED      2     mm Hg  -------- Legend: (L)  and  (H)  tonya values outside specified reference range. ---------------------------------------------------------------------------- Prepared and electronically signed by Gene Luther MD 06/15/2024 17:31     XR CHEST AP PORTABLE  (CPT=71045)    Result Date: 6/14/2024  PROCEDURE:  XR CHEST AP PORTABLE  (CPT=71045)  TECHNIQUE:  AP chest radiograph was obtained.  COMPARISON:  [facility] , XR, XR CHEST AP PORTABLE  (CPT=71045), 1/08/2022, 3:09 PM.  INDICATIONS:  RAMÍREZ  PATIENT STATED HISTORY: (As transcribed by Technologist)  Patient stated she has been having difficulty breathing for a few days.    FINDINGS:  There is stable cardiomegaly.  Subsegmental atelectasis at the lung bases.  No acute airspace disease.  There are no pleural effusions.  Mild degenerative changes of the spine.            CONCLUSION:  Mild cardiomegaly with subsegmental atelectasis at the lung bases.   LOCATION:  [facility]      Dictated by (CST): Alexy Ambrosio MD on 6/14/2024 at 12:43 PM     Finalized by (CST): Alexy Ambrosio MD on 6/14/2024 at 12:44 PM         Operative Procedures:      Disposition: alive    Patient Instructions: Current and discharge medications reviewed with patient  Discharge Medication List as of 6/20/2024  1:25 PM        START taking these medications    Details   nystatin 100,000 Units/g External Cream Apply 1 Application topically 2 (two) times daily for 10 days. Abdominal folds, Print Script, Disp-15 g, R-0      spironolactone 25 MG  Oral Tab Take 1 tablet (25 mg total) by mouth daily., Normal, Disp-30 tablet, R-0      sacubitril-valsartan 24-26 MG Oral Tab Take 1 tablet by mouth 2 (two) times daily., Normal, Disp-60 tablet, R-0           CONTINUE these medications which have CHANGED    Details   furosemide 40 MG Oral Tab Take 1 tablet (40 mg total) by mouth 2 (two) times daily., Normal, Disp-60 tablet, R-0           CONTINUE these medications which have NOT CHANGED    Details   ipratropium-albuterol  MCG/ACT Inhalation Aero Soln Inhale 1 puff into the lungs 4 (four) times daily., Historical      levoFLOXacin 250 MG Oral Tab Take 1 tablet (250 mg total) by mouth daily., Historical      pantoprazole 40 MG Oral Tab EC Take 1 tablet (40 mg total) by mouth every morning before breakfast., Historical      dextromethorphan-guaiFENesin  MG/5ML Oral Syrup Take 5 mL by mouth every 12 (twelve) hours as needed., Historical      !! acetaminophen 500 MG Oral Tab Take 2 tablets (1,000 mg total) by mouth 2 (two) times daily. 0600 & 2100, Historical      !! acetaminophen 500 MG Oral Tab Take 1 tablet (500 mg total) by mouth in the morning and 1 tablet (500 mg total) before bedtime. 1100 & 1600., Historical      cyanocobalamin 1000 MCG/ML Injection Solution Inject 1 mL (1,000 mcg total) into the muscle every 30 (thirty) days. On the 1st of every month, Historical      folic acid 1 MG Oral Tab Take 2 tablets (2 mg total) by mouth daily. 1400, Historical      glimepiride 2 MG Oral Tab Take 1 tablet (2 mg total) by mouth daily with breakfast., Historical      pyridoxine 50 MG Oral Tab Take 50 mg by mouth daily., Historical      metFORMIN 500 MG Oral Tab Take 2 tablets (1,000 mg total) by mouth 2 (two) times daily with meals., Historical      fluticasone propionate 50 MCG/ACT Nasal Suspension 1 spray by Each Nare route daily., Historical      METHOTREXATE SODIUM 2.5 MG Oral Tab TAKE 8 TABLETS BY MOUTH EVERY 7 DAYS., Normal, Disp-96 tablet, R-0       ERGOCALCIFEROL 1.25 MG (29759 UT) Oral Cap TAKE 1 CAPSULE BY MOUTH EVERY 14 DAYS, Normal, Disp-6 capsule, R-1      carbidopa-levodopa  MG Oral Tab Take 1 tablet by mouth 3 (three) times daily., Normal, Disp-270 tablet, R-3Patient last seen 2018 follow-up required to evaluate condition      METOPROLOL SUCCINATE 100 MG Oral Tablet 24 Hr TAKE 1 TABLET(100 MG) BY MOUTH TWICE DAILY, Normal, Disp-180 tablet, R-0      Denosumab 60 MG/ML Subcutaneous Solution Prefilled Syringe Inject 1 mL (60 mg total) into the skin once. Historical documentation - see Epic Immunization Activity for administration details, Historical, Disp-1 mL, R-0      prednisoLONE acetate 1 % Ophthalmic Suspension Place 1 drop into the left eye 4 (four) times daily., Normal, Disp-5 mL, R-0      Cyclopentolate HCl (CYCLOGYL) 1 % Ophthalmic Solution Apply 1 drop to eye 2 (two) times daily. To affected eye, Normal, Disp-1 Bottle, R-1      Sertraline HCl 50 MG Oral Tab Take 1 tablet (50 mg total) by mouth daily., Normal, Disp-30 tablet, R-3      gabapentin 300 MG Oral Cap Take 1 capsule (300 mg total) by mouth 2 (two) times a day. At 7pm & 9pm., Normal, Disp-180 capsule, R-3      SIMVASTATIN 20 MG Oral Tab TAKE 1 TABLET BY MOUTH EVERY DAY, Normal, Disp-90 tablet, R-3      ASPIRIN LOW DOSE 81 MG Oral Tab EC TAKE 1 TABLET BY MOUTH TWICE DAILY, Normal, Disp-180 tablet, R-3      tiZANidine HCl 4 MG Oral Cap Take 1 capsule (4 mg total) by mouth 3 (three) times daily., Normal, Disp-30 capsule, R-11      Ipratropium Bromide 0.06 % Nasal Solution 2 sprays by Nasal route 3 (three) times daily., Normal, Disp-1 Bottle, R-0      Teriparatide, Recombinant, 600 MCG/2.4ML Subcutaneous Solution Inject 20 mcg into the skin daily., Print Script, Disp-4 Syringe, R-3Prescription printed: submit with [employer] forms      Insulin Pen Needle (BD PEN NEEDLE MINI U/F) 31G X 5 MM Does not apply Misc Use one needle daily to administer forteo injections, Normal, Disp-90 each,  R-3      OXYBUTYNIN CHLORIDE 5 MG Oral Tab TAKE 1/2 TABLET BY MOUTH TWICE DAILY, Normal, Disp-90 tablet, R-3      MODAFINIL 100 MG Oral Tab TAKE 1 TABLET BY MOUTH EVERY DAY., Normal, Disp-90 tablet, R-2      Glucose Blood (ACCU-CHEK SMARTVIEW) In Vitro Strip USE TO TEST THREE TIMES DAILY OR AS DIRECTED, Normal, Disp-300 strip, R-2DX E11.9      ACCU-CHEK FASTCLIX LANCETS Does not apply Misc TEST THREE TIMES DAILY OR AS DIRECTED, Normal, Disp-306 each, R-1DX Code E11.9      Blood Glucose Monitoring Suppl (ACCU-CHEK ONELIA SMARTVIEW) W/DEVICE Does not apply Kit Use to test blood sugar., Normal, Disp-1 kit, R-0      OnabotulinumtoxinA (BOTOX IJ) Inject as directed. Every 3 months, Historical      InFLIXimab (REMICADE IV) Inject 10 mg/kg into the vein. Every six weeks, Historical       !! - Potential duplicate medications found. Please discuss with provider.        STOP taking these medications       AMLODIPINE 5 MG Oral Tab        Potassium Chloride ER 10 MEQ Oral Tab CR        OMEPRAZOLE 20 MG Oral Capsule Delayed Release        losartan 100 MG Oral Tab              Home Medication Changes:     Activity: as directed  Diet: as directed  Wound Care:  prn  Code Status: DNAR/Selective Treatment  O2: prn    Follow-up with      Follow up with Gene Luther  Specialty: CARDIOLOGY  As scheduled 7/8/2024 @ 10: 20 AM    For outpatinet workup/ eval for [facility]  [address]  [facility] [address] [phone]           Schedule an appointment with Norm Mendoza as soon as possible for a visit in 1 week(s)  Specialty: Internal Medicine [address] [place] [address] [phone]             Total Time Coordinating Care: 35 minutes    Patient had opportunity to ask questions and state understand and agree with therapeutic plan as outlined    Thank You,  Marilin Ovalle MD    [facility] Hospitalist  Internal Medicine  Answering Service number: [phone]

## 2024-06-25 ENCOUNTER — LAB REQUISITION (OUTPATIENT)
Dept: LAB | Facility: HOSPITAL | Age: 84
End: 2024-06-25

## 2024-06-25 DIAGNOSIS — I50.9 HEART FAILURE, UNSPECIFIED (HCC): ICD-10-CM

## 2024-06-25 DIAGNOSIS — I10 ESSENTIAL (PRIMARY) HYPERTENSION: ICD-10-CM

## 2024-06-25 LAB
ALBUMIN SERPL-MCNC: 3.1 G/DL (ref 3.4–5)
ALBUMIN/GLOB SERPL: 0.7 {RATIO} (ref 1–2)
ALP LIVER SERPL-CCNC: 172 U/L
ALT SERPL-CCNC: 9 U/L
ANION GAP SERPL CALC-SCNC: 8 MMOL/L (ref 0–18)
AST SERPL-CCNC: 15 U/L (ref 15–37)
BASOPHILS # BLD AUTO: 0.08 X10(3) UL (ref 0–0.2)
BASOPHILS NFR BLD AUTO: 1 %
BILIRUB SERPL-MCNC: 0.3 MG/DL (ref 0.1–2)
BUN BLD-MCNC: 36 MG/DL (ref 9–23)
CALCIUM BLD-MCNC: 9.2 MG/DL (ref 8.5–10.1)
CHLORIDE SERPL-SCNC: 106 MMOL/L (ref 98–112)
CO2 SERPL-SCNC: 25 MMOL/L (ref 21–32)
CREAT BLD-MCNC: 1.01 MG/DL
EGFRCR SERPLBLD CKD-EPI 2021: 55 ML/MIN/1.73M2 (ref 60–?)
EOSINOPHIL # BLD AUTO: 0.44 X10(3) UL (ref 0–0.7)
EOSINOPHIL NFR BLD AUTO: 5.5 %
ERYTHROCYTE [DISTWIDTH] IN BLOOD BY AUTOMATED COUNT: 15.8 %
FASTING STATUS PATIENT QL REPORTED: YES
GLOBULIN PLAS-MCNC: 4.6 G/DL (ref 2.8–4.4)
GLUCOSE BLD-MCNC: 93 MG/DL (ref 70–99)
HCT VFR BLD AUTO: 32.8 %
HGB BLD-MCNC: 10.1 G/DL
IMM GRANULOCYTES # BLD AUTO: 0.02 X10(3) UL (ref 0–1)
IMM GRANULOCYTES NFR BLD: 0.2 %
LYMPHOCYTES # BLD AUTO: 3.28 X10(3) UL (ref 1–4)
LYMPHOCYTES NFR BLD AUTO: 40.8 %
MCH RBC QN AUTO: 26.7 PG (ref 26–34)
MCHC RBC AUTO-ENTMCNC: 30.8 G/DL (ref 31–37)
MCV RBC AUTO: 86.8 FL
MONOCYTES # BLD AUTO: 1.14 X10(3) UL (ref 0.1–1)
MONOCYTES NFR BLD AUTO: 14.2 %
NEUTROPHILS # BLD AUTO: 3.08 X10 (3) UL (ref 1.5–7.7)
NEUTROPHILS # BLD AUTO: 3.08 X10(3) UL (ref 1.5–7.7)
NEUTROPHILS NFR BLD AUTO: 38.3 %
OSMOLALITY SERPL CALC.SUM OF ELEC: 296 MOSM/KG (ref 275–295)
PLATELET # BLD AUTO: 339 10(3)UL (ref 150–450)
POTASSIUM SERPL-SCNC: 3.8 MMOL/L (ref 3.5–5.1)
PROT SERPL-MCNC: 7.7 G/DL (ref 6.4–8.2)
RBC # BLD AUTO: 3.78 X10(6)UL
SODIUM SERPL-SCNC: 139 MMOL/L (ref 136–145)
WBC # BLD AUTO: 8 X10(3) UL (ref 4–11)

## 2024-06-25 PROCEDURE — 85025 COMPLETE CBC W/AUTO DIFF WBC: CPT | Performed by: INTERNAL MEDICINE

## 2024-06-25 PROCEDURE — 80053 COMPREHEN METABOLIC PANEL: CPT | Performed by: INTERNAL MEDICINE

## 2024-07-02 ENCOUNTER — LAB REQUISITION (OUTPATIENT)
Dept: LAB | Facility: HOSPITAL | Age: 84
End: 2024-07-02
Payer: MEDICARE

## 2024-07-02 DIAGNOSIS — I50.9 HEART FAILURE, UNSPECIFIED (HCC): ICD-10-CM

## 2024-07-02 DIAGNOSIS — I10 ESSENTIAL (PRIMARY) HYPERTENSION: ICD-10-CM

## 2024-07-02 LAB
ALBUMIN SERPL-MCNC: 3.1 G/DL (ref 3.4–5)
ALBUMIN/GLOB SERPL: 0.7 {RATIO} (ref 1–2)
ALP LIVER SERPL-CCNC: 142 U/L
ALT SERPL-CCNC: 13 U/L
ANION GAP SERPL CALC-SCNC: 7 MMOL/L (ref 0–18)
AST SERPL-CCNC: 15 U/L (ref 15–37)
BASOPHILS # BLD AUTO: 0.07 X10(3) UL (ref 0–0.2)
BASOPHILS NFR BLD AUTO: 0.9 %
BILIRUB SERPL-MCNC: 0.3 MG/DL (ref 0.1–2)
BUN BLD-MCNC: 23 MG/DL (ref 9–23)
CALCIUM BLD-MCNC: 9 MG/DL (ref 8.5–10.1)
CHLORIDE SERPL-SCNC: 105 MMOL/L (ref 98–112)
CO2 SERPL-SCNC: 28 MMOL/L (ref 21–32)
CREAT BLD-MCNC: 0.88 MG/DL
EGFRCR SERPLBLD CKD-EPI 2021: 64 ML/MIN/1.73M2 (ref 60–?)
EOSINOPHIL # BLD AUTO: 0.34 X10(3) UL (ref 0–0.7)
EOSINOPHIL NFR BLD AUTO: 4.3 %
ERYTHROCYTE [DISTWIDTH] IN BLOOD BY AUTOMATED COUNT: 15.6 %
FASTING STATUS PATIENT QL REPORTED: YES
GLOBULIN PLAS-MCNC: 4.4 G/DL (ref 2.8–4.4)
GLUCOSE BLD-MCNC: 93 MG/DL (ref 70–99)
HCT VFR BLD AUTO: 33.5 %
HGB BLD-MCNC: 10.3 G/DL
IMM GRANULOCYTES # BLD AUTO: 0.04 X10(3) UL (ref 0–1)
IMM GRANULOCYTES NFR BLD: 0.5 %
LYMPHOCYTES # BLD AUTO: 3.66 X10(3) UL (ref 1–4)
LYMPHOCYTES NFR BLD AUTO: 46 %
MCH RBC QN AUTO: 26.8 PG (ref 26–34)
MCHC RBC AUTO-ENTMCNC: 30.7 G/DL (ref 31–37)
MCV RBC AUTO: 87 FL
MONOCYTES # BLD AUTO: 0.92 X10(3) UL (ref 0.1–1)
MONOCYTES NFR BLD AUTO: 11.6 %
NEUTROPHILS # BLD AUTO: 2.93 X10 (3) UL (ref 1.5–7.7)
NEUTROPHILS # BLD AUTO: 2.93 X10(3) UL (ref 1.5–7.7)
NEUTROPHILS NFR BLD AUTO: 36.7 %
OSMOLALITY SERPL CALC.SUM OF ELEC: 293 MOSM/KG (ref 275–295)
PLATELET # BLD AUTO: 315 10(3)UL (ref 150–450)
POTASSIUM SERPL-SCNC: 3.6 MMOL/L (ref 3.5–5.1)
PROT SERPL-MCNC: 7.5 G/DL (ref 6.4–8.2)
RBC # BLD AUTO: 3.85 X10(6)UL
SODIUM SERPL-SCNC: 140 MMOL/L (ref 136–145)
VIT D+METAB SERPL-MCNC: 59.2 NG/ML (ref 30–100)
WBC # BLD AUTO: 8 X10(3) UL (ref 4–11)

## 2024-07-02 PROCEDURE — 85025 COMPLETE CBC W/AUTO DIFF WBC: CPT | Performed by: INTERNAL MEDICINE

## 2024-07-02 PROCEDURE — 82306 VITAMIN D 25 HYDROXY: CPT | Performed by: INTERNAL MEDICINE

## 2024-07-02 PROCEDURE — 80053 COMPREHEN METABOLIC PANEL: CPT | Performed by: INTERNAL MEDICINE

## 2024-07-22 ENCOUNTER — APPOINTMENT (OUTPATIENT)
Dept: CT IMAGING | Facility: HOSPITAL | Age: 84
End: 2024-07-22
Attending: EMERGENCY MEDICINE
Payer: MEDICARE

## 2024-07-22 ENCOUNTER — HOSPITAL ENCOUNTER (EMERGENCY)
Facility: HOSPITAL | Age: 84
Discharge: HOME | End: 2024-07-22
Attending: EMERGENCY MEDICINE
Payer: MEDICARE

## 2024-07-22 VITALS
TEMPERATURE: 98 F | HEART RATE: 69 BPM | SYSTOLIC BLOOD PRESSURE: 151 MMHG | BODY MASS INDEX: 32.94 KG/M2 | RESPIRATION RATE: 31 BRPM | WEIGHT: 179 LBS | DIASTOLIC BLOOD PRESSURE: 87 MMHG | HEIGHT: 62 IN | OXYGEN SATURATION: 95 %

## 2024-07-22 DIAGNOSIS — R03.0 ELEVATED BLOOD PRESSURE READING: ICD-10-CM
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
## 2024-07-22 DIAGNOSIS — S09.90XA INJURY OF HEAD, INITIAL ENCOUNTER: ICD-10-CM

## 2024-07-22 LAB
ALBUMIN SERPL-MCNC: 4 G/DL (ref 3.2–4.8)
ALBUMIN/GLOB SERPL: 1.2 {RATIO} (ref 1–2)
ALP LIVER SERPL-CCNC: 115 U/L
ALT SERPL-CCNC: <7 U/L
ANION GAP SERPL CALC-SCNC: 9 MMOL/L (ref 0–18)
AST SERPL-CCNC: 19 U/L (ref ?–34)
BASOPHILS # BLD AUTO: 0.03 X10(3) UL (ref 0–0.2)
BASOPHILS NFR BLD AUTO: 0.4 %
BILIRUB SERPL-MCNC: 0.3 MG/DL (ref 0.2–1.1)
BUN BLD-MCNC: 11 MG/DL (ref 9–23)
CALCIUM BLD-MCNC: 9.3 MG/DL (ref 8.7–10.4)
CHLORIDE SERPL-SCNC: 106 MMOL/L (ref 98–112)
CO2 SERPL-SCNC: 24 MMOL/L (ref 21–32)
CREAT BLD-MCNC: 0.73 MG/DL
EGFRCR SERPLBLD CKD-EPI 2021: 81 ML/MIN/1.73M2 (ref 60–?)
EOSINOPHIL # BLD AUTO: 0.3 X10(3) UL (ref 0–0.7)
EOSINOPHIL NFR BLD AUTO: 4.3 %
ERYTHROCYTE [DISTWIDTH] IN BLOOD BY AUTOMATED COUNT: 15.7 %
GLOBULIN PLAS-MCNC: 3.3 G/DL (ref 2.8–4.4)
GLUCOSE BLD-MCNC: 128 MG/DL (ref 70–99)
HCT VFR BLD AUTO: 32.2 %
HGB BLD-MCNC: 10.3 G/DL
IMM GRANULOCYTES # BLD AUTO: 0.02 X10(3) UL (ref 0–1)
IMM GRANULOCYTES NFR BLD: 0.3 %
LYMPHOCYTES # BLD AUTO: 2.58 X10(3) UL (ref 1–4)
LYMPHOCYTES NFR BLD AUTO: 36.6 %
MCH RBC QN AUTO: 26.8 PG (ref 26–34)
MCHC RBC AUTO-ENTMCNC: 32 G/DL (ref 31–37)
MCV RBC AUTO: 83.6 FL
MONOCYTES # BLD AUTO: 0.78 X10(3) UL (ref 0.1–1)
MONOCYTES NFR BLD AUTO: 11.1 %
NEUTROPHILS # BLD AUTO: 3.34 X10 (3) UL (ref 1.5–7.7)
NEUTROPHILS # BLD AUTO: 3.34 X10(3) UL (ref 1.5–7.7)
NEUTROPHILS NFR BLD AUTO: 47.3 %
OSMOLALITY SERPL CALC.SUM OF ELEC: 289 MOSM/KG (ref 275–295)
PLATELET # BLD AUTO: 221 10(3)UL (ref 150–450)
POTASSIUM SERPL-SCNC: 3.5 MMOL/L (ref 3.5–5.1)
PROT SERPL-MCNC: 7.3 G/DL (ref 5.7–8.2)
Q-T INTERVAL: 390 MS
QRS DURATION: 84 MS
QTC CALCULATION (BEZET): 453 MS
R AXIS: -2 DEGREES
RBC # BLD AUTO: 3.85 X10(6)UL
SODIUM SERPL-SCNC: 139 MMOL/L (ref 136–145)
T AXIS: -4 DEGREES
TROPONIN I SERPL HS-MCNC: 12 NG/L
VENTRICULAR RATE: 81 BPM
WBC # BLD AUTO: 7.1 X10(3) UL (ref 4–11)

## 2024-07-22 PROCEDURE — 80053 COMPREHEN METABOLIC PANEL: CPT | Performed by: EMERGENCY MEDICINE

## 2024-07-22 PROCEDURE — 99285 EMERGENCY DEPT VISIT HI MDM: CPT

## 2024-07-22 PROCEDURE — 36415 COLL VENOUS BLD VENIPUNCTURE: CPT

## 2024-07-22 PROCEDURE — 70450 CT HEAD/BRAIN W/O DYE: CPT | Performed by: EMERGENCY MEDICINE

## 2024-07-22 PROCEDURE — 84484 ASSAY OF TROPONIN QUANT: CPT | Performed by: EMERGENCY MEDICINE

## 2024-07-22 PROCEDURE — 93005 ELECTROCARDIOGRAM TRACING: CPT

## 2024-07-22 PROCEDURE — 93010 ELECTROCARDIOGRAM REPORT: CPT

## 2024-07-22 PROCEDURE — 72125 CT NECK SPINE W/O DYE: CPT | Performed by: EMERGENCY MEDICINE

## 2024-07-22 PROCEDURE — 85025 COMPLETE CBC W/AUTO DIFF WBC: CPT | Performed by: EMERGENCY MEDICINE

## 2024-07-22 NOTE — ED QUICK NOTES
Spoke with staff at [facility]- pt's residence, report given, pt ok to return, [facility] medicar scheduled to arrive at approx 1850

## 2024-07-22 NOTE — ED INITIAL ASSESSMENT (HPI)
Patient to ED for HTN and lightheadedness. States 4 days ago fell back and hit her head on the toilet. Denies LOC. States has had a headache since.

## 2024-07-23 NOTE — ED PROVIDER NOTES
Patient Seen in: [facility] Emergency Department      History     Chief Complaint   Patient presents with    Hypertension    Dizziness     Stated Complaint: Hypertension, lightheaded    Subjective:   HPI    83-year-old woman history of Parkinson's disease, previous CVA, diabetes hypertension here for evaluation of head injury.  Patient states 2 days ago she was getting up off the toilet, lost her balance fell backwards struck her head on the tank of the toilet.  States she has mild neck discomfort as well, has had a headache since she struck her head.  There was no seizures and no vomiting denies any new focal weakness or numbness any other complaints.  Blood pressure was checked earlier today was higher than normal.  Denies any chest pain abdominal pain new back pain any other complaints or concerns.    Objective:   Past Medical History:    C2 cervical fracture (HCC)    C5 vertebral fracture (HCC)    CVA (cerebral infarction)    x2    GERD (gastroesophageal reflux disease)    HTN (hypertension)    SHANA (obstructive sleep apnea)    Osteoarthritis    Overactive bladder    Parkinson's disease (HCC)    Pulmonary hypertension (HCC)    RA (rheumatoid arthritis) (HCC)    Type II or unspecified type diabetes mellitus without mention of complication, not stated as uncontrolled    Unspecified sleep apnea    AHI 78 RDI 80 SaO2 deirdre 85 % CPAP 11  Sleep RX/ now HME    Vitamin D deficiency    Vitamin D deficiency              Past Surgical History:   Procedure Laterality Date    Cholecystectomy      Hernia surgery      Hip replacement surgery Bilateral     Knee replacement surgery Right     Other surgical history      \"Veins removed\"    Removal of heel spur Right     Tonsillectomy                  Social History     Socioeconomic History    Marital status:    Tobacco Use    Smoking status: Never    Smokeless tobacco: Never   Vaping Use    Vaping status: Never Used   Substance and Sexual Activity    Alcohol use: No      Alcohol/week: 0.0 standard drinks of alcohol    Drug use: No   Social History Narrative     2014    5 kids    No tobacco, EtOH, drugs    Retired      Social Determinants of Health     Food Insecurity: No Food Insecurity (6/14/2024)    Food Insecurity     Food Insecurity: Never true   Transportation Needs: No Transportation Needs (6/14/2024)    Transportation Needs     Lack of Transportation: No   Housing Stability: Low Risk  (6/14/2024)    Housing Stability     Housing Instability: No              Review of Systems    Positive for stated Chief Complaint: Hypertension and Dizziness    Other systems are as noted in HPI.  Constitutional and vital signs reviewed.      All other systems reviewed and negative except as noted above.    Physical Exam     ED Triage Vitals [07/22/24 1424]   BP (!) 181/88   Pulse 83   Resp 17   Temp 97.6 °F (36.4 °C)   Temp src Temporal   SpO2 97 %   O2 Device None (Room air)       Current Vitals:   Vital Signs  BP: 151/87  Pulse: 69  Resp: (!) 31  Temp: 97.6 °F (36.4 °C)  Temp src: Temporal  MAP (mmHg): (!) 108    Oxygen Therapy  SpO2: 95 %  O2 Device: None (Room air)            Physical Exam        Physical Exam  Vitals signs and nursing note reviewed.   General: Well-appearing elderly woman sitting up in the bed in no acute distress.  Head: Normocephalic and atraumatic.   HEENT:  Mucous membranes are moist.   Neck: No midline tenderness does report some discomfort with range of motion.  Cardiovascular:  Normal rate and regular rhythm.  No Edema  Pulmonary:  Pulmonary effort is normal.  Normal breath sounds. No wheezing, rhonchi or rales.   Abdominal: Soft nontender nondistended, normal bowel sounds, no guarding no rebound tenderness  Skin: Warm and dry  Neurological: Awake alert, speech is normal        ED Course     Labs Reviewed   COMP METABOLIC PANEL (14) - Abnormal; Notable for the following components:       Result Value    Glucose 128 (*)     ALT <7 (*)     All  other components within normal limits   CBC W/ DIFFERENTIAL - Abnormal; Notable for the following components:    HGB 10.3 (*)     HCT 32.2 (*)     All other components within normal limits   TROPONIN I HIGH SENSITIVITY - Normal   CBC WITH DIFFERENTIAL WITH PLATELET    Narrative:     The following orders were created for panel order CBC With Differential With Platelet.  Procedure                               Abnormality         Status                     ---------                               -----------         ------                     CBC W/ DIFFERENTIAL[[id number]]          Abnormal            Final result                 Please view results for these tests on the individual orders.   RAINBOW DRAW LAVENDER   RAINBOW DRAW LIGHT GREEN     EKG    Rate, intervals and axes as noted on EKG Report.  Rate: 81  Rhythm: Atrial Fibrillation  Reading: No acute ischemic changes                 CT SPINE CERVICAL (CPT=72125)    Result Date: 7/22/2024  PROCEDURE:  CT SPINE CERVICAL (CPT=72125)  COMPARISON:  [facility] CT, CT SPINE CERVICAL (CPT=72125), 8/19/2018, 3:38 AM.  INDICATIONS:  Fall with posterior head injury neck pain 3 days ago  TECHNIQUE:  Noncontrast CT scanning of the cervical spine is performed from the skull base through C7.  Multiplanar reconstructions are generated.  Dose reduction techniques were used. Dose information is transmitted to the ACR (American College of Radiology) NRDR (National Radiology Data Registry) which includes the Dose Index Registry.  PATIENT STATED HISTORY: (As transcribed by Technologist)   Fall with posterior head injury 3 days ago    FINDINGS:  There are postsurgical changes of C2-C6 posterior pedicle screw and quyen fixation with posterior decompression.  No evidence of hardware loosening or failure.  No evidence of acute fracture.  Multilevel degenerative changes with osteophyte formation and uncovertebral and facet joint hypertrophy are redemonstrated.  The dens is intact.  C1-C2  articulation is maintained.             CONCLUSION:  Postsurgical changes of C2-C6 posterior decompression and fusion.  No evidence of hardware loosening or failure.  No acute cervical spine fracture identified.    LOCATION:  [facility]   Dictated by (CST): Mario Alberto Llamas MD on 7/22/2024 at 5:40 PM     Finalized by (CST): Mario Alberto Llamas MD on 7/22/2024 at 5:45 PM       CT BRAIN OR HEAD (03689)    Result Date: 7/22/2024  PROCEDURE:  CT BRAIN OR HEAD ([id number])  COMPARISON:  [facility] , CT, CT BRAIN OR HEAD ([id number]), 8/19/2018, 3:35 AM.  INDICATIONS:  Fall with posterior head injury 3 days ago  TECHNIQUE:  Noncontrast CT scanning is performed through the brain. Dose reduction techniques were used. Dose information is transmitted to the ACR (American College of Radiology) NRDR (National Radiology Data Registry) which includes the Dose Index Registry.  PATIENT STATED HISTORY: (As transcribed by Technologist)   Fall with posterior head injury 3 days ago, headache.    FINDINGS:  VENTRICLES/SULCI:  Prominence of the ventricles and sulci diffusely consistent atrophy. INTRACRANIAL:  Low-attenuation the periventricular white matter consistent small vessel ischemic disease.  There is an old lacunar infarct within the left subinsular white matter..  There are no abnormal extraaxial fluid collections.  There is no midline  shift.  There are no intraparenchymal brain abnormalities.  There is nothing specific for acute infarct.  There is no hemorrhage or mass lesion.  SINUSES:           No sign of acute sinusitis.  MASTOIDS:          No sign of acute inflammation. SKULL:             No evidence for fracture or osseous abnormality. OTHER:             None.            CONCLUSION:  No evidence of acute intracranial process.    LOCATION:  [facility]   Dictated by (CST): Alexy Ambrosio MD on 7/22/2024 at 5:12 PM     Finalized by (CST): Alexy Ambrosio MD on 7/22/2024 at 5:14 PM               MDM      83-year-old woman here for evaluation after recent  fall 2 days ago differential includes head injury, intracranial hemorrhage skull fracture, cervical spine fracture.  Patient appears neurologically intact, CT head CT cervical spine showed no acute abnormality.  Patient's blood pressure was noted to be elevated, did improve to 150s over 80s without intervention her renal function is normal she is otherwise asymptomatic.  Will be discharged back in good condition return precautions discussed        I independently viewed and interpreted the following imaging: CT of the head with no acute intracranial hemorrhage                           Medical Decision Making      Disposition and Plan     Clinical Impression:  1. Fall, initial encounter    2. Injury of head, initial encounter    3. Elevated blood pressure reading         Disposition:  Discharge  7/22/2024  6:25 pm    Follow-up:  Norm Hui MD  [address] [id number] [phone]    Follow up  Follow-up with your PMD for reevaluation in 24 to 48 hours.  Return to ER if symptoms worsen or change or if any other new concerns.          Medications Prescribed:  Discharge Medication List as of 7/22/2024  6:59 PM

## 2024-08-27 ENCOUNTER — LAB REQUISITION (OUTPATIENT)
Dept: LAB | Facility: HOSPITAL | Age: 84
End: 2024-08-27
Payer: MEDICARE

## 2024-08-27 ENCOUNTER — LAB ENCOUNTER (OUTPATIENT)
Dept: LAB | Age: 84
End: 2024-08-27
Payer: MEDICARE

## 2024-08-27 DIAGNOSIS — Z01.810 ENCOUNTER FOR PREPROCEDURAL CARDIOVASCULAR EXAMINATION: ICD-10-CM

## 2024-08-27 DIAGNOSIS — I48.21 PERMANENT ATRIAL FIBRILLATION (HCC): Primary | ICD-10-CM

## 2024-08-27 DIAGNOSIS — I48.21 PERMANENT ATRIAL FIBRILLATION (HCC): ICD-10-CM

## 2024-08-27 DIAGNOSIS — E11.42 TYPE 2 DIABETES MELLITUS WITH DIABETIC POLYNEUROPATHY (HCC): ICD-10-CM

## 2024-08-27 LAB
ANION GAP SERPL CALC-SCNC: 11 MMOL/L (ref 0–18)
ANTIBODY SCREEN: POSITIVE
BASOPHILS # BLD AUTO: 0.04 X10(3) UL (ref 0–0.2)
BASOPHILS NFR BLD AUTO: 0.5 %
BUN BLD-MCNC: 28 MG/DL (ref 9–23)
CALCIUM BLD-MCNC: 9.5 MG/DL (ref 8.7–10.4)
CHLORIDE SERPL-SCNC: 100 MMOL/L (ref 98–112)
CO2 SERPL-SCNC: 26 MMOL/L (ref 21–32)
CREAT BLD-MCNC: 1.09 MG/DL
EGFRCR SERPLBLD CKD-EPI 2021: 49 ML/MIN/1.73M2 (ref 60–?)
EOSINOPHIL # BLD AUTO: 0.18 X10(3) UL (ref 0–0.7)
EOSINOPHIL NFR BLD AUTO: 2.3 %
ERYTHROCYTE [DISTWIDTH] IN BLOOD BY AUTOMATED COUNT: 17.2 %
EST. AVERAGE GLUCOSE BLD GHB EST-MCNC: 157 MG/DL (ref 68–126)
FASTING STATUS PATIENT QL REPORTED: NO
GLUCOSE BLD-MCNC: 180 MG/DL (ref 70–99)
HBA1C MFR BLD: 7.1 % (ref ?–5.7)
HCT VFR BLD AUTO: 34.5 %
HGB BLD-MCNC: 11 G/DL
IMM GRANULOCYTES # BLD AUTO: 0.02 X10(3) UL (ref 0–1)
IMM GRANULOCYTES NFR BLD: 0.3 %
LYMPHOCYTES # BLD AUTO: 3.26 X10(3) UL (ref 1–4)
LYMPHOCYTES NFR BLD AUTO: 40.9 %
MCH RBC QN AUTO: 26.5 PG (ref 26–34)
MCHC RBC AUTO-ENTMCNC: 31.9 G/DL (ref 31–37)
MCV RBC AUTO: 83.1 FL
MONOCYTES # BLD AUTO: 0.85 X10(3) UL (ref 0.1–1)
MONOCYTES NFR BLD AUTO: 10.7 %
NEUTROPHILS # BLD AUTO: 3.62 X10 (3) UL (ref 1.5–7.7)
NEUTROPHILS # BLD AUTO: 3.62 X10(3) UL (ref 1.5–7.7)
NEUTROPHILS NFR BLD AUTO: 45.3 %
OSMOLALITY SERPL CALC.SUM OF ELEC: 294 MOSM/KG (ref 275–295)
PLATELET # BLD AUTO: 262 10(3)UL (ref 150–450)
POTASSIUM SERPL-SCNC: 3.1 MMOL/L (ref 3.5–5.1)
RBC # BLD AUTO: 4.15 X10(6)UL
RH BLOOD TYPE: POSITIVE
SODIUM SERPL-SCNC: 137 MMOL/L (ref 136–145)
WBC # BLD AUTO: 8 X10(3) UL (ref 4–11)

## 2024-08-27 PROCEDURE — 80048 BASIC METABOLIC PNL TOTAL CA: CPT

## 2024-08-27 PROCEDURE — 83036 HEMOGLOBIN GLYCOSYLATED A1C: CPT | Performed by: INTERNAL MEDICINE

## 2024-08-27 PROCEDURE — 36415 COLL VENOUS BLD VENIPUNCTURE: CPT

## 2024-08-27 PROCEDURE — 86900 BLOOD TYPING SEROLOGIC ABO: CPT

## 2024-08-27 PROCEDURE — 85025 COMPLETE CBC W/AUTO DIFF WBC: CPT

## 2024-08-27 PROCEDURE — 86901 BLOOD TYPING SEROLOGIC RH(D): CPT

## 2024-08-27 PROCEDURE — 86850 RBC ANTIBODY SCREEN: CPT

## 2024-08-27 PROCEDURE — 86870 RBC ANTIBODY IDENTIFICATION: CPT

## 2024-09-09 RX ORDER — FLUTICASONE PROPIONATE AND SALMETEROL XINAFOATE 115; 21 UG/1; UG/1
AEROSOL, METERED RESPIRATORY (INHALATION) 2 TIMES DAILY
COMMUNITY

## 2024-09-09 NOTE — PAT NURSING NOTE
PreOp Instructions     You are scheduled for: a Cardiac Procedure     Date of Procedure: 09/13/24     Diet Instructions: Do not eat or drink anything after midnight including gum, mints, candy, etc.     Medications: Medications you are allowed to take can be taken with a sip of water the morning of your procedure     Medications to Stop: Hold herbal supplements and vitamins the morning of procedure.     Other Medications: HOLD ENTRESTO FOR 24 HOURS PRIOR TO PROCEDURE. LAST DOSE ON 9/12 IN THE MORNING. DO NOT TAKE SPIRINOLACTONE AND LASIX THE MORNING OF PROCEDURE.     Diabetic Instructions: HOLD THE DIABETIC MEDICATIONS  DIRECTED BY YOUR PHYSICIAN. DO NOT TAKE THE MORNING DOSE OF YOUR DIABETIC MEDICATIONS.     Skin Prep: Shower with antibacterial soap using a clean washcloth, prior to procedure. Once dried off, no lotions/powders/creams/ointments, etc.     Arrival Time: The day prior to your procedure you will receive a phone call before 6:00 pm with your arrival time. If you haven't received a phone call, please check your voicemail messages., If you did not receive a voice mail and it is after 6:00 pm, please call the nursing supervisor at [phone].    Driving After Procedure: If sedation is given, you WILL NOT be able to drive home. You will need a responsible adult  to drive you home.     Discharge Teaching: Your nurse will give you specific instructions before discharge, Most people can resume normal activities in 2-3 days, Any questions, please call the physician's office

## 2024-09-10 ENCOUNTER — LAB REQUISITION (OUTPATIENT)
Dept: LAB | Facility: HOSPITAL | Age: 84
End: 2024-09-10
Payer: MEDICARE

## 2024-09-10 DIAGNOSIS — I10 ESSENTIAL (PRIMARY) HYPERTENSION: ICD-10-CM

## 2024-09-10 DIAGNOSIS — I50.9 HEART FAILURE, UNSPECIFIED (HCC): ICD-10-CM

## 2024-09-10 LAB
ALBUMIN SERPL-MCNC: 4.1 G/DL (ref 3.2–4.8)
ALBUMIN/GLOB SERPL: 1.2 {RATIO} (ref 1–2)
ALP LIVER SERPL-CCNC: 98 U/L
ALT SERPL-CCNC: 10 U/L
ANION GAP SERPL CALC-SCNC: 6 MMOL/L (ref 0–18)
AST SERPL-CCNC: 19 U/L (ref ?–34)
BASOPHILS # BLD AUTO: 0.04 X10(3) UL (ref 0–0.2)
BASOPHILS NFR BLD AUTO: 0.5 %
BILIRUB SERPL-MCNC: 0.2 MG/DL (ref 0.2–1.1)
BUN BLD-MCNC: 23 MG/DL (ref 9–23)
CALCIUM BLD-MCNC: 9.9 MG/DL (ref 8.7–10.4)
CHLORIDE SERPL-SCNC: 104 MMOL/L (ref 98–112)
CO2 SERPL-SCNC: 32 MMOL/L (ref 21–32)
CREAT BLD-MCNC: 0.89 MG/DL
EGFRCR SERPLBLD CKD-EPI 2021: 63 ML/MIN/1.73M2 (ref 60–?)
EOSINOPHIL # BLD AUTO: 0.21 X10(3) UL (ref 0–0.7)
EOSINOPHIL NFR BLD AUTO: 2.5 %
ERYTHROCYTE [DISTWIDTH] IN BLOOD BY AUTOMATED COUNT: 18.5 %
FASTING STATUS PATIENT QL REPORTED: YES
GLOBULIN PLAS-MCNC: 3.3 G/DL (ref 2–3.5)
GLUCOSE BLD-MCNC: 74 MG/DL (ref 70–99)
HCT VFR BLD AUTO: 33.8 %
HGB BLD-MCNC: 10.6 G/DL
IMM GRANULOCYTES # BLD AUTO: 0.02 X10(3) UL (ref 0–1)
IMM GRANULOCYTES NFR BLD: 0.2 %
LYMPHOCYTES # BLD AUTO: 4.05 X10(3) UL (ref 1–4)
LYMPHOCYTES NFR BLD AUTO: 48 %
MCH RBC QN AUTO: 26.6 PG (ref 26–34)
MCHC RBC AUTO-ENTMCNC: 31.4 G/DL (ref 31–37)
MCV RBC AUTO: 84.9 FL
MONOCYTES # BLD AUTO: 0.95 X10(3) UL (ref 0.1–1)
MONOCYTES NFR BLD AUTO: 11.3 %
NEUTROPHILS # BLD AUTO: 3.16 X10 (3) UL (ref 1.5–7.7)
NEUTROPHILS # BLD AUTO: 3.16 X10(3) UL (ref 1.5–7.7)
NEUTROPHILS NFR BLD AUTO: 37.5 %
OSMOLALITY SERPL CALC.SUM OF ELEC: 296 MOSM/KG (ref 275–295)
PLATELET # BLD AUTO: 253 10(3)UL (ref 150–450)
POTASSIUM SERPL-SCNC: 4 MMOL/L (ref 3.5–5.1)
PROT SERPL-MCNC: 7.4 G/DL (ref 5.7–8.2)
RBC # BLD AUTO: 3.98 X10(6)UL
SODIUM SERPL-SCNC: 142 MMOL/L (ref 136–145)
WBC # BLD AUTO: 8.4 X10(3) UL (ref 4–11)

## 2024-09-10 PROCEDURE — 85025 COMPLETE CBC W/AUTO DIFF WBC: CPT | Performed by: INTERNAL MEDICINE

## 2024-09-10 PROCEDURE — 80053 COMPREHEN METABOLIC PANEL: CPT | Performed by: INTERNAL MEDICINE

## 2024-09-10 RX ORDER — CEPHALEXIN 500 MG/1
500 CAPSULE ORAL 3 TIMES DAILY
COMMUNITY
End: 2024-09-14

## 2024-09-10 NOTE — PAT NURSING NOTE
Call from Anish RN - pt starting on Keflex 500 mg TID x 7 days for an open area blister on her 4th toe. Dr. Luther notified, and he wants to continue with the procedure. Anish mancini.

## 2024-09-13 ENCOUNTER — APPOINTMENT (OUTPATIENT)
Dept: CV DIAGNOSTICS | Facility: HOSPITAL | Age: 84
End: 2024-09-13
Payer: MEDICARE

## 2024-09-13 ENCOUNTER — HOSPITAL ENCOUNTER (INPATIENT)
Dept: INTERVENTIONAL RADIOLOGY/VASCULAR | Facility: HOSPITAL | Age: 84
LOS: 1 days | Discharge: HOME | End: 2024-09-14
Attending: INTERNAL MEDICINE | Admitting: INTERNAL MEDICINE
Payer: MEDICARE

## 2024-09-13 ENCOUNTER — ANESTHESIA EVENT (OUTPATIENT)
Dept: INTERVENTIONAL RADIOLOGY/VASCULAR | Facility: HOSPITAL | Age: 84
End: 2024-09-13
Payer: MEDICARE

## 2024-09-13 ENCOUNTER — APPOINTMENT (OUTPATIENT)
Dept: CV DIAGNOSTICS | Facility: HOSPITAL | Age: 84
End: 2024-09-13
Attending: INTERNAL MEDICINE
Payer: MEDICARE

## 2024-09-13 DIAGNOSIS — I48.91 A-FIB (HCC): ICD-10-CM

## 2024-09-13 DIAGNOSIS — H20.9 UVEITIS: ICD-10-CM

## 2024-09-13 PROBLEM — Z01.818 PRE-OP TESTING: Status: ACTIVE | Noted: 2024-09-13

## 2024-09-13 LAB
ANION GAP SERPL CALC-SCNC: 10 MMOL/L (ref 0–18)
ANTIBODY SCREEN: NEGATIVE
BASOPHILS # BLD AUTO: 0.02 X10(3) UL (ref 0–0.2)
BASOPHILS NFR BLD AUTO: 0.2 %
BUN BLD-MCNC: 16 MG/DL (ref 9–23)
CALCIUM BLD-MCNC: 9.9 MG/DL (ref 8.7–10.4)
CHLORIDE SERPL-SCNC: 103 MMOL/L (ref 98–112)
CO2 SERPL-SCNC: 26 MMOL/L (ref 21–32)
CREAT BLD-MCNC: 0.94 MG/DL
EGFRCR SERPLBLD CKD-EPI 2021: 59 ML/MIN/1.73M2 (ref 60–?)
EOSINOPHIL # BLD AUTO: 0.05 X10(3) UL (ref 0–0.7)
EOSINOPHIL NFR BLD AUTO: 0.6 %
ERYTHROCYTE [DISTWIDTH] IN BLOOD BY AUTOMATED COUNT: 18.8 %
GLUCOSE BLD-MCNC: 124 MG/DL (ref 70–99)
GLUCOSE BLD-MCNC: 132 MG/DL (ref 70–99)
GLUCOSE BLD-MCNC: 195 MG/DL (ref 70–99)
HCT VFR BLD AUTO: 33.9 %
HGB BLD-MCNC: 10.8 G/DL
IMM GRANULOCYTES # BLD AUTO: 0.04 X10(3) UL (ref 0–1)
IMM GRANULOCYTES NFR BLD: 0.5 %
INR BLD: 1.64 (ref 0.8–1.2)
ISTAT ACTIVATED CLOTTING TIME: 238 SECONDS (ref 74–137)
LYMPHOCYTES # BLD AUTO: 1.38 X10(3) UL (ref 1–4)
LYMPHOCYTES NFR BLD AUTO: 16.8 %
MCH RBC QN AUTO: 27.2 PG (ref 26–34)
MCHC RBC AUTO-ENTMCNC: 31.9 G/DL (ref 31–37)
MCV RBC AUTO: 85.4 FL
MONOCYTES # BLD AUTO: 0.29 X10(3) UL (ref 0.1–1)
MONOCYTES NFR BLD AUTO: 3.5 %
NEUTROPHILS # BLD AUTO: 6.41 X10 (3) UL (ref 1.5–7.7)
NEUTROPHILS # BLD AUTO: 6.41 X10(3) UL (ref 1.5–7.7)
NEUTROPHILS NFR BLD AUTO: 78.4 %
OSMOLALITY SERPL CALC.SUM OF ELEC: 295 MOSM/KG (ref 275–295)
PLATELET # BLD AUTO: 209 10(3)UL (ref 150–450)
POTASSIUM SERPL-SCNC: 3.9 MMOL/L (ref 3.5–5.1)
PROTHROMBIN TIME: 19.5 SECONDS (ref 11.6–14.8)
RBC # BLD AUTO: 3.97 X10(6)UL
RH BLOOD TYPE: POSITIVE
RH BLOOD TYPE: POSITIVE
SODIUM SERPL-SCNC: 139 MMOL/L (ref 136–145)
WBC # BLD AUTO: 8.2 X10(3) UL (ref 4–11)

## 2024-09-13 PROCEDURE — 85610 PROTHROMBIN TIME: CPT

## 2024-09-13 PROCEDURE — 93308 TTE F-UP OR LMTD: CPT | Performed by: INTERNAL MEDICINE

## 2024-09-13 PROCEDURE — 86900 BLOOD TYPING SEROLOGIC ABO: CPT

## 2024-09-13 PROCEDURE — 93321 DOPPLER ECHO F-UP/LMTD STD: CPT | Performed by: INTERNAL MEDICINE

## 2024-09-13 PROCEDURE — 82962 GLUCOSE BLOOD TEST: CPT

## 2024-09-13 PROCEDURE — 93325 DOPPLER ECHO COLOR FLOW MAPG: CPT | Performed by: INTERNAL MEDICINE

## 2024-09-13 PROCEDURE — 86850 RBC ANTIBODY SCREEN: CPT

## 2024-09-13 PROCEDURE — 85025 COMPLETE CBC W/AUTO DIFF WBC: CPT

## 2024-09-13 PROCEDURE — 93355 ECHO TRANSESOPHAGEAL (TEE): CPT

## 2024-09-13 PROCEDURE — 02L73DK OCCLUSION OF LEFT ATRIAL APPENDAGE WITH INTRALUMINAL DEVICE, PERCUTANEOUS APPROACH: ICD-10-PCS | Performed by: INTERNAL MEDICINE

## 2024-09-13 PROCEDURE — 86901 BLOOD TYPING SEROLOGIC RH(D): CPT

## 2024-09-13 PROCEDURE — 80048 BASIC METABOLIC PNL TOTAL CA: CPT

## 2024-09-13 PROCEDURE — 85347 COAGULATION TIME ACTIVATED: CPT

## 2024-09-13 PROCEDURE — B245ZZ4 ULTRASONOGRAPHY OF LEFT HEART, TRANSESOPHAGEAL: ICD-10-PCS | Performed by: INTERNAL MEDICINE

## 2024-09-13 PROCEDURE — B2151ZZ FLUOROSCOPY OF LEFT HEART USING LOW OSMOLAR CONTRAST: ICD-10-PCS | Performed by: INTERNAL MEDICINE

## 2024-09-13 PROCEDURE — 33340 PERQ CLSR TCAT L ATR APNDGE: CPT | Performed by: INTERNAL MEDICINE

## 2024-09-13 RX ORDER — ASPIRIN 81 MG/1
81 TABLET ORAL NIGHTLY
Status: DISCONTINUED | OUTPATIENT
Start: 2024-09-13 | End: 2024-09-14

## 2024-09-13 RX ORDER — METOPROLOL SUCCINATE 100 MG/1
100 TABLET, EXTENDED RELEASE ORAL
Status: DISCONTINUED | OUTPATIENT
Start: 2024-09-13 | End: 2024-09-14

## 2024-09-13 RX ORDER — CLOPIDOGREL BISULFATE 75 MG/1
75 TABLET ORAL DAILY
Status: DISCONTINUED | OUTPATIENT
Start: 2024-09-13 | End: 2024-09-14

## 2024-09-13 RX ORDER — MIDAZOLAM HYDROCHLORIDE 1 MG/ML
1 INJECTION INTRAMUSCULAR; INTRAVENOUS EVERY 5 MIN PRN
Status: DISCONTINUED | OUTPATIENT
Start: 2024-09-13 | End: 2024-09-13 | Stop reason: HOSPADM

## 2024-09-13 RX ORDER — DEXTROSE MONOHYDRATE 25 G/50ML
50 INJECTION, SOLUTION INTRAVENOUS
Status: DISCONTINUED | OUTPATIENT
Start: 2024-09-13 | End: 2024-09-13 | Stop reason: HOSPADM

## 2024-09-13 RX ORDER — ATORVASTATIN CALCIUM 10 MG/1
10 TABLET, FILM COATED ORAL NIGHTLY
Status: DISCONTINUED | OUTPATIENT
Start: 2024-09-13 | End: 2024-09-14

## 2024-09-13 RX ORDER — PHENYLEPHRINE HCL 10 MG/ML
VIAL (ML) INJECTION AS NEEDED
Status: DISCONTINUED | OUTPATIENT
Start: 2024-09-13 | End: 2024-09-13 | Stop reason: SURG

## 2024-09-13 RX ORDER — HYDROMORPHONE HYDROCHLORIDE 1 MG/ML
0.6 INJECTION, SOLUTION INTRAMUSCULAR; INTRAVENOUS; SUBCUTANEOUS EVERY 5 MIN PRN
Status: DISCONTINUED | OUTPATIENT
Start: 2024-09-13 | End: 2024-09-13 | Stop reason: HOSPADM

## 2024-09-13 RX ORDER — SPIRONOLACTONE 25 MG/1
25 TABLET ORAL DAILY
Status: DISCONTINUED | OUTPATIENT
Start: 2024-09-13 | End: 2024-09-14

## 2024-09-13 RX ORDER — HYDROMORPHONE HYDROCHLORIDE 1 MG/ML
0.2 INJECTION, SOLUTION INTRAMUSCULAR; INTRAVENOUS; SUBCUTANEOUS EVERY 5 MIN PRN
Status: DISCONTINUED | OUTPATIENT
Start: 2024-09-13 | End: 2024-09-13 | Stop reason: HOSPADM

## 2024-09-13 RX ORDER — SODIUM CHLORIDE, SODIUM LACTATE, POTASSIUM CHLORIDE, CALCIUM CHLORIDE 600; 310; 30; 20 MG/100ML; MG/100ML; MG/100ML; MG/100ML
INJECTION, SOLUTION INTRAVENOUS CONTINUOUS
Status: DISCONTINUED | OUTPATIENT
Start: 2024-09-13 | End: 2024-09-13 | Stop reason: HOSPADM

## 2024-09-13 RX ORDER — HYDROCODONE BITARTRATE AND ACETAMINOPHEN 5; 325 MG/1; MG/1
2 TABLET ORAL ONCE AS NEEDED
Status: DISCONTINUED | OUTPATIENT
Start: 2024-09-13 | End: 2024-09-13 | Stop reason: HOSPADM

## 2024-09-13 RX ORDER — LIDOCAINE HYDROCHLORIDE 10 MG/ML
INJECTION, SOLUTION EPIDURAL; INFILTRATION; INTRACAUDAL; PERINEURAL AS NEEDED
Status: DISCONTINUED | OUTPATIENT
Start: 2024-09-13 | End: 2024-09-13 | Stop reason: SURG

## 2024-09-13 RX ORDER — ACETAMINOPHEN 325 MG/1
650 TABLET ORAL EVERY 4 HOURS PRN
Status: DISCONTINUED | OUTPATIENT
Start: 2024-09-13 | End: 2024-09-14

## 2024-09-13 RX ORDER — SODIUM CHLORIDE 9 MG/ML
INJECTION, SOLUTION INTRAVENOUS CONTINUOUS
Status: DISCONTINUED | OUTPATIENT
Start: 2024-09-13 | End: 2024-09-14

## 2024-09-13 RX ORDER — PROTAMINE SULFATE 10 MG/ML
INJECTION, SOLUTION INTRAVENOUS AS NEEDED
Status: DISCONTINUED | OUTPATIENT
Start: 2024-09-13 | End: 2024-09-13 | Stop reason: SURG

## 2024-09-13 RX ORDER — NALOXONE HYDROCHLORIDE 0.4 MG/ML
80 INJECTION, SOLUTION INTRAMUSCULAR; INTRAVENOUS; SUBCUTANEOUS AS NEEDED
Status: DISCONTINUED | OUTPATIENT
Start: 2024-09-13 | End: 2024-09-13 | Stop reason: HOSPADM

## 2024-09-13 RX ORDER — ACETAMINOPHEN 500 MG
1000 TABLET ORAL ONCE AS NEEDED
Status: DISCONTINUED | OUTPATIENT
Start: 2024-09-13 | End: 2024-09-13 | Stop reason: HOSPADM

## 2024-09-13 RX ORDER — LIDOCAINE HYDROCHLORIDE 10 MG/ML
INJECTION, SOLUTION EPIDURAL; INFILTRATION; INTRACAUDAL; PERINEURAL
Status: COMPLETED
Start: 2024-09-13 | End: 2024-09-13

## 2024-09-13 RX ORDER — PROTAMINE SULFATE 10 MG/ML
INJECTION, SOLUTION INTRAVENOUS
Status: COMPLETED
Start: 2024-09-13 | End: 2024-09-13

## 2024-09-13 RX ORDER — METOPROLOL TARTRATE 1 MG/ML
2.5 INJECTION, SOLUTION INTRAVENOUS ONCE
Status: DISCONTINUED | OUTPATIENT
Start: 2024-09-13 | End: 2024-09-13 | Stop reason: HOSPADM

## 2024-09-13 RX ORDER — DIPHENHYDRAMINE HYDROCHLORIDE 50 MG/ML
12.5 INJECTION INTRAMUSCULAR; INTRAVENOUS AS NEEDED
Status: DISCONTINUED | OUTPATIENT
Start: 2024-09-13 | End: 2024-09-13 | Stop reason: HOSPADM

## 2024-09-13 RX ORDER — ACETAMINOPHEN AND CODEINE PHOSPHATE 300; 30 MG/1; MG/1
1 TABLET ORAL EVERY 4 HOURS PRN
Status: DISCONTINUED | OUTPATIENT
Start: 2024-09-13 | End: 2024-09-14

## 2024-09-13 RX ORDER — METOCLOPRAMIDE HYDROCHLORIDE 5 MG/ML
5 INJECTION INTRAMUSCULAR; INTRAVENOUS EVERY 8 HOURS PRN
Status: DISCONTINUED | OUTPATIENT
Start: 2024-09-13 | End: 2024-09-13 | Stop reason: HOSPADM

## 2024-09-13 RX ORDER — ACETAMINOPHEN AND CODEINE PHOSPHATE 300; 30 MG/1; MG/1
2 TABLET ORAL EVERY 4 HOURS PRN
Status: DISCONTINUED | OUTPATIENT
Start: 2024-09-13 | End: 2024-09-14

## 2024-09-13 RX ORDER — HEPARIN SODIUM 1000 [USP'U]/ML
INJECTION, SOLUTION INTRAVENOUS; SUBCUTANEOUS AS NEEDED
Status: DISCONTINUED | OUTPATIENT
Start: 2024-09-13 | End: 2024-09-13 | Stop reason: SURG

## 2024-09-13 RX ORDER — FUROSEMIDE 20 MG
40 TABLET ORAL
Status: DISCONTINUED | OUTPATIENT
Start: 2024-09-13 | End: 2024-09-14

## 2024-09-13 RX ORDER — NICOTINE POLACRILEX 4 MG
30 LOZENGE BUCCAL
Status: DISCONTINUED | OUTPATIENT
Start: 2024-09-13 | End: 2024-09-13 | Stop reason: HOSPADM

## 2024-09-13 RX ORDER — DEXAMETHASONE SODIUM PHOSPHATE 4 MG/ML
VIAL (ML) INJECTION AS NEEDED
Status: DISCONTINUED | OUTPATIENT
Start: 2024-09-13 | End: 2024-09-13 | Stop reason: SURG

## 2024-09-13 RX ORDER — INSULIN ASPART 100 [IU]/ML
INJECTION, SOLUTION INTRAVENOUS; SUBCUTANEOUS ONCE
Status: DISCONTINUED | OUTPATIENT
Start: 2024-09-13 | End: 2024-09-13 | Stop reason: HOSPADM

## 2024-09-13 RX ORDER — ONDANSETRON 2 MG/ML
4 INJECTION INTRAMUSCULAR; INTRAVENOUS EVERY 6 HOURS PRN
Status: DISCONTINUED | OUTPATIENT
Start: 2024-09-13 | End: 2024-09-13 | Stop reason: HOSPADM

## 2024-09-13 RX ORDER — HYDROCODONE BITARTRATE AND ACETAMINOPHEN 5; 325 MG/1; MG/1
1 TABLET ORAL ONCE AS NEEDED
Status: DISCONTINUED | OUTPATIENT
Start: 2024-09-13 | End: 2024-09-13 | Stop reason: HOSPADM

## 2024-09-13 RX ORDER — ROCURONIUM BROMIDE 10 MG/ML
INJECTION, SOLUTION INTRAVENOUS AS NEEDED
Status: DISCONTINUED | OUTPATIENT
Start: 2024-09-13 | End: 2024-09-13 | Stop reason: SURG

## 2024-09-13 RX ORDER — ONDANSETRON 2 MG/ML
INJECTION INTRAMUSCULAR; INTRAVENOUS AS NEEDED
Status: DISCONTINUED | OUTPATIENT
Start: 2024-09-13 | End: 2024-09-13 | Stop reason: SURG

## 2024-09-13 RX ORDER — NICOTINE POLACRILEX 4 MG
15 LOZENGE BUCCAL
Status: DISCONTINUED | OUTPATIENT
Start: 2024-09-13 | End: 2024-09-13 | Stop reason: HOSPADM

## 2024-09-13 RX ORDER — HEPARIN SODIUM 5000 [USP'U]/ML
INJECTION, SOLUTION INTRAVENOUS; SUBCUTANEOUS
Status: COMPLETED
Start: 2024-09-13 | End: 2024-09-13

## 2024-09-13 RX ORDER — HYDROMORPHONE HYDROCHLORIDE 1 MG/ML
0.4 INJECTION, SOLUTION INTRAMUSCULAR; INTRAVENOUS; SUBCUTANEOUS EVERY 5 MIN PRN
Status: DISCONTINUED | OUTPATIENT
Start: 2024-09-13 | End: 2024-09-13 | Stop reason: HOSPADM

## 2024-09-13 RX ADMIN — SODIUM CHLORIDE: 9 INJECTION, SOLUTION INTRAVENOUS at 16:51:00

## 2024-09-13 RX ADMIN — SODIUM CHLORIDE: 9 INJECTION, SOLUTION INTRAVENOUS at 13:15:00

## 2024-09-13 RX ADMIN — METOPROLOL SUCCINATE 100 MG: 100 TABLET, EXTENDED RELEASE ORAL at 16:42:00

## 2024-09-13 RX ADMIN — CLOPIDOGREL BISULFATE 75 MG: 75 TABLET ORAL at 20:44:00

## 2024-09-13 RX ADMIN — DEXAMETHASONE SODIUM PHOSPHATE 4 MG: 4 MG/ML VIAL (ML) INJECTION at 11:30:00

## 2024-09-13 RX ADMIN — FUROSEMIDE 40 MG: 20 MG TABLET ORAL at 16:42:00

## 2024-09-13 RX ADMIN — SPIRONOLACTONE 25 MG: 25 TABLET ORAL at 16:42:00

## 2024-09-13 RX ADMIN — ASPIRIN 81 MG: 81 TABLET ORAL at 20:44:00

## 2024-09-13 RX ADMIN — PROTAMINE SULFATE 50 MG: 10 INJECTION, SOLUTION INTRAVENOUS at 12:18:00

## 2024-09-13 RX ADMIN — PHENYLEPHRINE HCL 100 MCG: 10 MG/ML VIAL (ML) INJECTION at 12:00:00

## 2024-09-13 RX ADMIN — LIDOCAINE HYDROCHLORIDE 50 MG: 10 INJECTION, SOLUTION EPIDURAL; INFILTRATION; INTRACAUDAL; PERINEURAL at 11:07:00

## 2024-09-13 RX ADMIN — ROCURONIUM BROMIDE 50 MG: 10 INJECTION, SOLUTION INTRAVENOUS at 11:07:00

## 2024-09-13 RX ADMIN — HEPARIN SODIUM 10000 UNITS: 1000 INJECTION, SOLUTION INTRAVENOUS; SUBCUTANEOUS at 11:54:00

## 2024-09-13 RX ADMIN — ATORVASTATIN CALCIUM 10 MG: 10 TABLET, FILM COATED ORAL at 20:45:00

## 2024-09-13 RX ADMIN — ONDANSETRON 4 MG: 2 INJECTION INTRAMUSCULAR; INTRAVENOUS at 12:18:00

## 2024-09-13 RX ADMIN — PHENYLEPHRINE HCL 200 MCG: 10 MG/ML VIAL (ML) INJECTION at 11:52:00

## 2024-09-13 RX ADMIN — Medication 1 MG: at 22:13:00

## 2024-09-13 RX ADMIN — PHENYLEPHRINE HCL 200 MCG: 10 MG/ML VIAL (ML) INJECTION at 11:42:00

## 2024-09-13 NOTE — PROCEDURES
Left Atrial Appendage Occlusion  Gene Luther MD           Pre-diagnosis: PAF  Post-diagnosis: S/P LAAO  Procedure: LAAO  : Ashkan TORRES  DOS: 9/13/24        Procedural Details: We used a microkit for venous access.  We \"preclosed\" the vein.  We used a BS modifiable curve Bethesda sheath with dilator and Pony Zero transseptal wire.  We went mid and mid for puncture; we spontaneously crossed.  We did an angio through the sheath.  We used a 35mm Watchman Flex device and deployed it in the standard fashion.  We did a tug test.  We measured compression.  We did a post deployment angio.  We had 15-22% compression. We met PASS criteria.  We released the device. The device was stable and we used our perclose for hemostasis.     Complications: None.     Conclusions: s/p LAAO (Watchman) deployed successfully.  Follow up per protocol.  Plan discharge today.     Gene Luther MD

## 2024-09-13 NOTE — ANESTHESIA PREPROCEDURE EVALUATION
PRE-OP EVALUATION    Patient Name: Sandi Linder    Admit Diagnosis: A-fib (HCC) [I48.91]    Pre-op Diagnosis: * No surgery found *        Anesthesia Procedure: CATH WATCHMAN    * Surgery not found *    Pre-op vitals reviewed.        Body mass index is 30.55 kg/m².    Current medications reviewed.  Hospital Medications:  No current facility-administered medications on file as of 9/13/2024.       Outpatient Medications:   (Not in a hospital admission)      Allergies: Naproxen, Adhesive tape, Chocolate, and Strawberries      Anesthesia Evaluation    Patient summary reviewed.    Anesthetic Complications  (-) history of anesthetic complications         GI/Hepatic/Renal      (+) GERD                           Cardiovascular  Comment: Conclusions:     1. Left ventricle: The cavity size was normal. Wall thickness was normal.      Systolic function was normal. The estimated ejection fraction was 55-60%.      No diagnostic evidence for regional wall motion abnormalities. Unable to      assess LV diastolic function due to heart rhythm.   2. Left atrium: The left atrial volume was mildly increased.   3. Right atrium: The atrium was mildly dilated.   4. Mitral valve: There was mild regurgitation.   5. Pulmonary arteries: The peak systolic pressure is 39mm Hg.       ECG reviewed.            (+) hypertension                  (+) dysrhythmias and atrial fibrillation  (+) CHF                Endo/Other      (+) diabetes                     (+) arthritis  (+) rheumatoid arthritis     Pulmonary                    (+) sleep apnea       Neuro/Psych          (+) CVA       (+) neuromuscular disease             parkinsons      Past Surgical History:   Procedure Laterality Date   • Cholecystectomy     • Hernia surgery     • Hip replacement surgery Bilateral    • Knee replacement surgery Right    • Other surgical history      \"Veins removed\"   • Removal of heel spur Right    • Tonsillectomy       Social History     Socioeconomic History   •  Marital status:    Tobacco Use   • Smoking status: Never   • Smokeless tobacco: Never   Vaping Use   • Vaping status: Never Used   Substance and Sexual Activity   • Alcohol use: No     Alcohol/week: 0.0 standard drinks of alcohol   • Drug use: No     History   Drug Use No     Available pre-op labs reviewed.  Lab Results   Component Value Date    WBC 8.4 09/10/2024    RBC 3.98 09/10/2024    HGB 10.6 (L) 09/10/2024    HCT 33.8 (L) 09/10/2024    MCV 84.9 09/10/2024    MCH 26.6 09/10/2024    MCHC 31.4 09/10/2024    RDW 18.5 09/10/2024    .0 09/10/2024     Lab Results   Component Value Date     09/10/2024    K 4.0 09/10/2024     09/10/2024    CO2 32.0 09/10/2024    BUN 23 09/10/2024    CREATSERUM 0.89 09/10/2024    GLU 74 09/10/2024    CA 9.9 09/10/2024            Airway      Mallampati: II  Mouth opening: 3 FB  TM distance: 4 - 6 cm  Neck ROM: limited Cardiovascular      Rhythm: irregular  Rate: normal     Dental  Comment: No loose teeth reported by patient           Pulmonary      Breath sounds clear to auscultation bilaterally.               Other findings          ASA: 3   Plan: general  NPO status verified and patient meets guidelines.    Post-procedure pain management plan discussed with surgeon and patient.    Comment: GA with ETT, additional IV access, arterial line, and NOVA discussed.   Plan/risks discussed with: patient            Present on Admission:  **None**

## 2024-09-13 NOTE — ANESTHESIA POSTPROCEDURE EVALUATION
[facility]    Sandi Linder Patient Status:  Inpatient   Age/Gender 84 year old female MRN [id number]   Location [facility] INTERVENTIONAL SUITES Attending Gene Luther MD   Hosp Day # 0 PCP Norm Mendoza MD       Anesthesia Post-op Note        Procedure Summary       Date: 09/13/24 Room / Location: [facility] Interventional Suites    Anesthesia Start: 1107 Anesthesia Stop:     Procedure: CATH WATCHMAN Diagnosis:       A-fib (HCC)      A-fib (HCC)          Scheduled Providers: Jean Figueredo MD Anesthesiologist: Jean Figueredo MD    Anesthesia Type: general ASA Status: 3            Anesthesia Type: general    Vitals Value Taken Time   /99 09/13/24 1238   Temp 97.4 09/13/24 1238   Pulse 78 09/13/24 1238   Resp 16 09/13/24 1238   SpO2 100 09/13/24 1238       Patient Location: PACU    Anesthesia Type: general    Airway Patency: patent    Postop Pain Control: adequate    Mental Status: mildly sedated but able to meaningfully participate in the post-anesthesia evaluation    Nausea/Vomiting: none    Cardiopulmonary/Hydration status: stable euvolemic    Complications: no apparent anesthesia related complications    Postop vital signs: stable    Dental Exam: Unchanged from Preop    Patient to be discharged from PACU when criteria met.

## 2024-09-13 NOTE — PROCEDURES
Transesophageal Echocardiogram Report      Interventional NOVA (CPT code 88586) was performed for guidance of left atrial appendage closure.     The maximal width of the left atrial appendage ostium was 27 mm. Left ventricular ejection fraction was 55-60%. There was no pericardial effusion. No thrombus was noted.     A 35 mm Watchman FLX device was implanted in the left atrial appendage via transseptal approach. NOVA was used to safely guide puncture of the interatrial septum. The Watchman FLX device showed appropriate compression and no leak. No complications were noted at the end of the procedure.    Jam Ghosh MD

## 2024-09-13 NOTE — ANESTHESIA PROCEDURE NOTES
Peripheral IV  Date/Time: 9/13/2024 11:24 AM  Inserted by: Jean Figueredo MD    Placement  Needle size: 18 G  Laterality: right  Location: hand  Local anesthetic: none  Site prep: alcohol  Technique: anatomical landmarks  Attempts: 1

## 2024-09-13 NOTE — ANESTHESIA PROCEDURE NOTES
Airway  Date/Time: 9/13/2024 11:17 AM  Urgency: elective    Airway not difficult    General Information and Staff    Patient location during procedure: OR  Anesthesiologist: Jean Figueredo MD  Performed: anesthesiologist   Performed by: Jean Figueredo MD  Authorized by: Jean Figueredo MD      Indications and Patient Condition  Indications for airway management: anesthesia  Sedation level: deep  Preoxygenated: yes  Patient position: sniffing  Mask difficulty assessment: 1 - vent by mask    Final Airway Details  Final airway type: endotracheal airway      Successful airway: ETT  Cuffed: yes   Successful intubation technique: Video laryngoscopy  Endotracheal tube insertion site: oral  Blade: GlideScope  Blade size: #3  ETT size (mm): 7.5    Cormack-Lehane Classification: grade I - full view of glottis  Placement verified by: capnometry   Cuff volume (mL): 9  Measured from: lips  ETT to lips (cm): 22  Number of attempts at approach: 1

## 2024-09-13 NOTE — H&P
[facility]  Structural Heart Team  Pre-LAAO H&P Note     Sandi Linder Patient Status:  Inpatient    1940 MRN [id number]   Formerly [facility] INTERVENTIONAL SUITES Attending Gene Luther MD   Hosp Day # 0 PCP Norm Mendoza MD     Impression:  pAF      Plan:   Plan for LAAC with watchman device   PreLAAO orders  Consents to be signed and put in chart  Obtain blood consent and put in the chart      Subjective:  The patient is here for an elective watchman procedure      The patient is tolerating medications with no reported side effects.     Pt denies any swallowing difficulties and has had no problems with anesthesia.       Objective:  Ht 5' 4\" (1.626 m)   Wt 178 lb (80.7 kg)   BMI 30.55 kg/m²   No data recorded.    No intake or output data in the 24 hours ending 24 1010  Wt Readings from Last 3 Encounters:   24 178 lb (80.7 kg)   24 179 lb (81.2 kg)   24 177 lb 11.1 oz (80.6 kg)       General:Awake and alert; in acute distress  Cardiac: Regular rate and regular rhythm; no murmurs, no rubs/gallops are appreciated  Lungs: Clear to auscultation bilaterally; no accessory muscle use  Abdomen: Soft, non-tender; bowel sounds are normoactive  Extremities: No clubbing/cyanosis; moves all 4 extremities to command equally, pedal pulses + 2;  no BLE edema  Groins: bilateral groin dressings clean, dry and intact, soft, non-tender, no hematoma, good cms distally    Current Facility-Administered Medications   Medication Dose Route Frequency    iohexol (OMNIPAQUE) 350 MG/ML injection 100 mL  100 mL Injection ONCE PRN       CAN FILL IN DATA BELOW FROM CLINIC NOTE  Progress Notes  - documented in this encounter  Lisa Schmidt NP - 2024 1:20 PM CDT  Formatting of this note is different from the original.  Structural Heart Progress Note  LAAO Evaluation      HPI  Sandi F Kailash presents for discussion of LAAO Closure Procedure and to discuss alternatives to long-term  anticoagulation.      Patient was seen by Dr. Luther for LAAO closure consult. He considered her a candidate.    The shared decision making form will be completed by Dr. Mayfield    Patient has a history of pAF.    Reason for Consultation: Non valvular paroxysmal Atrial Fibrillation, being evaluated for LAAO closure    Reason for seeking an alternative means to long term anticoagulation: recurrent falls with head injury in the past. Also has dx of parkinson's.    Patient has not been on anticoagulation given the history of above.    Post Procedure AC plan  DAPT - until 6 months  Lifetime ASA 81 mg      Patient has no c/o chest pain, tightness, or pressure. No dyspnea, PND, orthopnea. BLE edema stable on current regimen.  Denies palpitations, dizziness, or syncope.    The patient is tolerating medications with no reported side effects.    HX of DCCV or ablation: no  HX of blood transfusion: no  Previous Structural Heart Interventions: no  Previous REGGIE Intervention: no  HX of CM: no - Non ischemic, Ischemic, Restrictive, Hypertrophic, Other  HX of chronic lung disease: no  HX of CAD: no  HX of sleep apea: yes  If yes to sleep apea, is RX followed: no    Social History  Tobacco Use  Smoking status: Never  Smokeless tobacco: Never  Vaping Use  Vaping status: Never Used  Alcohol use: No  Alcohol/week: 0.0 standard drinks of alcohol  Drug use: No    Past Medical History:  Diagnosis Date  C2 cervical fracture (HCC)  C5 vertebral fracture (HCC)  CVA (cerebral infarction)  x2  GERD (gastroesophageal reflux disease)  HTN (hypertension)  SHANA (obstructive sleep apnea)  Osteoarthritis  Overactive bladder  Parkinson's disease (HCC)  Pulmonary hypertension (HCC)  RA (rheumatoid arthritis) (HCC)  Type II or unspecified type diabetes mellitus without mention of complication, not stated as uncontrolled  Unspecified sleep apnea SPLIT 5-15-15  AHI 78 RDI 80 SaO2 deirdre 85 % CPAP 11 Sleep RX/ now HME  Vitamin D deficiency  Vitamin D  deficiency    Past Surgical History:  Procedure Laterality Date  Cataract Bilateral 03/28/2014  Cholecystectomy  Hernia surgery  Hip replacement surgery Bilateral  Knee replacement surgery Right  Other surgical history  \"Veins removed\"  Removal of heel spur Right  Tonsillectomy    Denies any anesthesia complications including malignant hypothermia. Denies family HX of any anesthesia complication.  Denies any swallowing problems    Current Outpatient Medications:  White Petrolatum-Mineral Oil (SYSTANE NIGHTTIME) Ophthalmic Ointment, Apply 1 inch to eye at bedtime., Disp: 1 g, Rfl: 11  polypropylene glycol- (SYSTANE ULTRA PF) 0.4-0.3 % Ophthalmic Solution, Place 1 drop into the right eye in the morning, at noon, in the evening, and at bedtime., Disp: 5 mL, Rfl: 11  prednisoLONE 1 % Ophthalmic Suspension, Place 1 drop into the left eye daily., Disp: 5 mL, Rfl: 3  sacubitril-valsartan (ENTRESTO) 24-26 MG Oral Tab, Take 1 tablet by mouth 2 (two) times daily., Disp: 180 tablet, Rfl: 3  spironolactone 25 MG Oral Tab, Take 1 tablet (25 mg total) by mouth at bedtime., Disp: 90 tablet, Rfl: 3  carbidopa-levodopa  MG Oral Tab, Take 1 tablet by mouth 3 (three) times daily., Disp: 270 tablet, Rfl: 3  fluticasone-salmeterol (ADVAIR HFA) 115-21 MCG/ACT Inhalation Aerosol, Inhale 2 puffs into the lungs 2 (two) times daily., Disp: 3 each, Rfl: 3  pantoprazole 40 MG Oral Tab EC, Take 1 tablet (40 mg total) by mouth daily. Before meal, Disp: 60 tablet, Rfl: 0  benzonatate 200 MG Oral Cap, , Disp: , Rfl:  cyanocobalamin 1000 MCG/ML Injection Solution, , Disp: , Rfl:  fluconazole 100 MG Oral Tab, , Disp: , Rfl:  ADVAIR DISKUS 250-50 MCG/ACT Inhalation Aerosol Powder, Breath Activated, , Disp: , Rfl:  metFORMIN HCl 1000 MG Oral Tab, , Disp: , Rfl:  Nystatin 009100 UNIT/GM External Powder, , Disp: , Rfl:  potassium chloride 20 MEQ Oral Tab CR, , Disp: , Rfl:  triamcinolone 0.1 % External Cream, , Disp: , Rfl:  furosemide 40  MG Oral Tab, , Disp: , Rfl:  glimepiride 4 MG Oral Tab, , Disp: , Rfl:  ATROPINE 1 % Ophthalmic Solution, INSTILL 1 DROP IN AFFECTED EYE(S) TWICE DAILY, Disp: 5 mL, Rfl: 0  omeprazole 20 MG Oral Capsule Delayed Release, , Disp: , Rfl:  Omeprazole 40 MG Oral Capsule Delayed Release, Take 1 capsule (40 mg total) by mouth daily. Before meal, Disp: 30 capsule, Rfl: 11  AMLODIPINE 5 MG Oral Tab, TAKE 1 TABLET(5 MG) BY MOUTH TWICE DAILY, Disp: 180 tablet, Rfl: 3  fluticasone propionate 50 MCG/ACT Nasal Suspension, 1 spray by Each Nare route daily., Disp: , Rfl:  ERGOCALCIFEROL 1.25 MG (85384 UT) Oral Cap, TAKE 1 CAPSULE BY MOUTH EVERY 14 DAYS, Disp: 6 capsule, Rfl: 1  METOPROLOL SUCCINATE 100 MG Oral Tablet 24 Hr, TAKE 1 TABLET(100 MG) BY MOUTH TWICE DAILY, Disp: 180 tablet, Rfl: 0  Denosumab 60 MG/ML Subcutaneous Solution Prefilled Syringe, Inject 1 mL (60 mg total) into the skin once. Historical documentation - see Epic Immunization Activity for administration details, Disp: 1 mL, Rfl: 0  Cyclopentolate HCl (CYCLOGYL) 1 % Ophthalmic Solution, Apply 1 drop to eye 2 (two) times daily. To affected eye, Disp: 1 Bottle, Rfl: 1  Sertraline HCl 50 MG Oral Tab, Take 1 tablet (50 mg total) by mouth daily., Disp: 30 tablet, Rfl: 3  gabapentin 300 MG Oral Cap, Take 1 capsule (300 mg total) by mouth 2 (two) times a day. At 7pm & 9pm., Disp: 180 capsule, Rfl: 3  Potassium Chloride ER 10 MEQ Oral Tab CR, Take 1 tablet (10 mEq total) by mouth daily., Disp: 90 tablet, Rfl: 3  SIMVASTATIN 20 MG Oral Tab, TAKE 1 TABLET BY MOUTH EVERY DAY, Disp: 90 tablet, Rfl: 3  ASPIRIN LOW DOSE 81 MG Oral Tab EC, TAKE 1 TABLET BY MOUTH TWICE DAILY, Disp: 180 tablet, Rfl: 3  FOLIC ACID 1 MG Oral Tab, TAKE 4 TABLETS(4 MG) BY MOUTH DAILY, Disp: 360 tablet, Rfl: 3  tiZANidine HCl 4 MG Oral Cap, Take 1 capsule (4 mg total) by mouth 3 (three) times daily., Disp: 30 capsule, Rfl: 11  losartan 100 MG Oral Tab, Take 1 tablet (100 mg total) by mouth once daily.,  Disp: 90 tablet, Rfl: 3  Ipratropium Bromide 0.06 % Nasal Solution, 2 sprays by Nasal route 3 (three) times daily., Disp: 1 Bottle, Rfl: 0  Insulin Pen Needle (BD PEN NEEDLE MINI U/F) 31G X 5 MM Does not apply Misc, Use one needle daily to administer forteo injections, Disp: 90 each, Rfl: 3  OXYBUTYNIN CHLORIDE 5 MG Oral Tab, TAKE 1/2 TABLET BY MOUTH TWICE DAILY, Disp: 90 tablet, Rfl: 3  MODAFINIL 100 MG Oral Tab, TAKE 1 TABLET BY MOUTH EVERY DAY., Disp: 90 tablet, Rfl: 2  Glucose Blood (ACCU-CHEK SMARTVIEW) In Vitro Strip, USE TO TEST THREE TIMES DAILY OR AS DIRECTED, Disp: 300 strip, Rfl: 2  ACCU-CHEK FASTCLIX LANCETS Does not apply Misc, TEST THREE TIMES DAILY OR AS DIRECTED, Disp: 306 each, Rfl: 1  Blood Glucose Monitoring Suppl (ACCU-CHEK ONELIA SMARTVIEW) W/DEVICE Does not apply Kit, Use to test blood sugar., Disp: 1 kit, Rfl: 0  OnabotulinumtoxinA (BOTOX IJ), Inject as directed. Every 3 months, Disp: , Rfl:  InFLIXimab (REMICADE IV), Inject into the vein. Every six weeks, Disp: , Rfl:    REVIEW OF SYSTEMS:  SEE HPI.  GENERAL: Denies fatigue  SKIN: denies any unusual bruising or rashes  RESPIRATORY: Denies cough, dyspnea, orthopnea, wheezes or GARZA  CARDIOVASCULAR: Denies chest pains, pain in jaw, neck, shoulders, back or arms, palpitations  GI: Denies melena or bloating  : Denies hematuria  EXTREMITIES: Denies edema. Denies claudication.  NEURO: Denies pre syncope, syncope, lightheadedness, dizziness  PHYSICAL EXAM:  /70  Pulse 81  Ht 5' 2\" (1.575 m)  Wt 178 lb (80.7 kg)  SpO2 97%  BMI 32.56 kg/m²  Wt Readings from Last 2 Encounters:  08/27/24 : 178 lb (80.7 kg)  07/08/24 : 177 lb (80.3 kg)    GENERAL: well developed, well nourished, in no apparent distress  EYES: no jaundice  NECK: no JVD  RESPIRATORY: clear to auscultation  CARDIOVASCULAR: S1, S2 normal, Regular rate and irregular rhythm; no S3, no S4;  EXTREMITIES: no cyanosis, clubbing. Peripheral pulses intact. +1 BLE edema  DERM: no rashes.  Skin is warm & dry  PSYCHE: cooperative  NEURO: Alert & oriented    DIAGNOSTICS:  Reviewed last cardiology note, cardiac labs    Pre-LAAC CTA Imaging  CONCLUSIONS:  1. 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 There is no  thrombus in the apex. The maximal diameter at the left atrial appendage  ostium is 28.9; the average diameter at the left atrial appendage ostium  is 26.7. The appendage depth distal to the landing zone is 17.3 mm. Based  upon these dimensions, left atrial appendage occlusion is BORDERLINE with  a 35 mm Watchman FLX device using a single FXD curve delivery sheath due  to limited depth. A 34 mm Amulet device appears more favorable.  2. The predicted fluoroscopic angle for optimal implantation is 35 degrees  of ANDREWS and 12 degrees of caudal angulation.  3. Transseptal puncture is recommended with a mid-inferior position along  the superior-inferior axis, and a mid position along the  anterior-posterior axis.  4. Please refer to the radiologist's interpretation of non-cardiac  structures.    Chaparro Iverson MD  6/18/2024      ASSESSMENT/PLAN  1. Non valvar pAF with CHADS-VASc score is 5 and HAS-BLED score is 1    This patients is being considered for REGGIE occlusion due to an elevated risk of stroke related to an elevated CHADSVASc score. The patient is suitable for short term DAPT therapy.    The REGGIE occlusion procedure was discussed at length with visual tutorials. The patient understands that short term dual antiplatelet therapy is required for a minimum of 6 months, and possibly up to 1 year (or indefinitely in the rare setting of a persistent leak). The patient understands that anticoagulation will be maintained in a variety of forms during the first year.    I explained the risks of bleeding, infection, or pain at the access sites, possible damage to the vasculature leading to the heart, the risk of cardiac perforation and tamponade possibly requiring emergent pericardiocentesis,  stroke, myocardial infarction, device migration, the possibility of device failure (inability to implant based on anatomy), the rare but possible need for emergent open heart surgery including sternotomy and/or cardiopulmonary bypass, and unforseen risks during the procedure. I explained the benefits of the procedure to the patient in great detail. I explained the alternatives to therapy which include staying on or discontinuing the blood thinner with the risks of each. After hearing the aforementioned, including both the risks and benefits, with acknowledging of understanding of each..    A two physician shared decision making process was held between Dr. Luther and Dr. Mayfield and both are in agreement to proceed with LAAO closure device, should the patient also agree to this therapeutic treatment option.    Patient does wish to proceed with the procedure, and understands that short term (6 months) of antiplatelet therapy is required.    35 minutes spent reviewing chart, history, patient symptoms, underlying risk factors, medical conditions, diagnosis, formulation of treatment plan, and initiation of medical therapy.      Lisa Schmidt NP APN, NP-C  [facility]  [phone]      Laboratory Data:     Lab Results   Component Value Date    INR 1.27 (H) 06/14/2024    INR 1.05 01/08/2022          Telemetry: No malignant tachyarrhythmias or bradyarrhythmias    JIMMY Irvin  9/13/2024  10:10 AM

## 2024-09-13 NOTE — PROGRESS NOTES
Pt post watchman from PACU. Pt awake, vss. Right femoral venous access site is soft, drsg is CDI.     Pt awaiting a bed assignment.     Recovery complete per protocol. Vss. Right fem site remains soft with no signs of bleeding or hematoma. Report given to Nubia pt transferred to 2622.

## 2024-09-13 NOTE — DISCHARGE INSTRUCTIONS
Left Atrial Appendage Occlusion Procedure: Watchman™     Groin site   You have a small puncture site in your groin from a catheter used during the procedure. It is important to check your site daily.     What to expect?   Your groin site may be bruised, tender or itch. This is normal.   It is normal to have a soft marble-sized lump at the site.   It is not normal if the lump becomes larger or more firm.     What if bleeding occurs?   Immediately lie down flat on the ground.   Have someone press down hard just above the puncture hole in your skin where the procedure was performed for 15 mins.   After 15 minutes, check the puncture site, it should be flat and dry.   If bleeding stops, continue to lie flat for 2 hours.     When to call 9-1-1?   If bleeding lasts longer than 15 mins or doesn’t stop call 9-1-1, -- DO NOT DRIVE to the Hospital.   If you notice there is quickly increased swelling at the puncture site or it is pulsating.   If there is continuous blood streaming from the puncture site.   If there is pain at the puncture site that makes walking difficult.   If there is color or temperature changes to your leg or foot.   If there is swelling in your ankle or foot.   If there is numbness/tingling in your leg or thigh.   If you experience calf tenderness or calf pain.   If you develop abdominal pain or unrelieved nausea/vomiting.   If you develop chest pain that:   Radiates to your neck, jaw or arm    Pain that lasts longer than 10 minutes   Pain not relieved by taking 1 nitroglycerin tablet   If the episodes of pain become more frequent or recurring     When to contact your provider?   Signs and symptoms of infection should be reported immediately to your provider.     Signs and symptoms of infection:   Fever greater than 101 degrees   Chills   Worsening pain at puncture site   Increased redness, warmth and swelling at puncture site that does not go away   Yellow, green or foul-smelling drainage from puncture  site.   Lightheaded or dizzy   Shortness of breath with activity   Irregular or fast heartbeat   Increasing numbness in your legs   Sudden weight gain     Hygiene:   Keep the puncture site clean and dry.   You may shower daily with warm water starting 24 hours post-procedure. Hot water showers may cause lightheadedness and dizziness and should be avoided.   Cleanse the site with mild soap and water. Do not scrub site or apply pressure.   Pat site dry with a towel, do not scrub.   No tub baths, swimming, soaking or hot tubs until approved by your provider.   Do not use powders, oils or lotions in your groin area until the site has completely healed.   Wear comfortable, loose fitted clothing.   With each toileting, wash the site with soap and water, rinse off and pat dry each time. Continue this until your groin site is completely healed.     Activity   Do NOT lift, pull or push more than 10 pounds for the first week.   You may gradually return to normal activities, pacing yourself as you feel better and resting when tired.        Medications:   You must take antibiotics before all dental procedures for the first 6 months after WATCHMAN™. A prescription was called into your pharmacy.   You are on dual antiplatelets therapy with Plavix and Aspirin for 6 months  DO NOT STOP taking Plavix OR Aspirin without speaking to your cardiologist first.  You will be taking aspirin 81mg per day, indefinitely after Watchman.      Other important information:   If you go to the Emergency Department or are admitted to the hospital during the first year after your procedure, ask your providers to call:  the Structural Heart Team   [place]: [phone]    · If you need an MRI, you must present your WATCHMAN™ information card.   · Always carry your WATCHMAN™ information card with you at all time.     Thank you for entrusting us with your care! Please contact us with any questions or concerns.     [facility] Structural Heart Team  [phone]  Weekends and after hours please call [phone]

## 2024-09-13 NOTE — ANESTHESIA PROCEDURE NOTES
Arterial Line    Date/Time: 9/13/2024 11:18 AM    Performed by: Jean Figueredo MD  Authorized by: Jean Figueredo MD    General Information and Staff    Procedure Start:  9/13/2024 11:18 AM  Procedure End:  9/13/2024 11:22 AM  Anesthesiologist:  Jean Figueredo MD  Performed By:  Anesthesiologist  Patient Location:  OR  Indication: continuous blood pressure monitoring and blood sampling needed    Site Identification: surface landmarks    Preanesthetic Checklist: 2 patient identifiers, IV checked, risks and benefits discussed, monitors and equipment checked, pre-op evaluation, timeout performed, anesthesia consent and sterile technique used    Procedure Details    Catheter Size:  20 G  Catheter Length:  1 and 3/4 inch  Catheter Type:  Arrow  Seldinger Technique?: Yes    Laterality:  Left  Site:  Radial artery  Site Prep: chlorhexidine    Line Secured:  Wrist Brace, tape and Tegaderm    Assessment    Events: patient tolerated procedure well with no complications and greater than 3 attempts      Medications  9/13/2024 11:18 AM      Additional Comments

## 2024-09-14 VITALS
SYSTOLIC BLOOD PRESSURE: 153 MMHG | HEART RATE: 74 BPM | HEIGHT: 64 IN | WEIGHT: 182.31 LBS | TEMPERATURE: 98 F | OXYGEN SATURATION: 95 % | BODY MASS INDEX: 31.12 KG/M2 | RESPIRATION RATE: 18 BRPM | DIASTOLIC BLOOD PRESSURE: 74 MMHG

## 2024-09-14 LAB
ANION GAP SERPL CALC-SCNC: 8 MMOL/L (ref 0–18)
BASOPHILS # BLD AUTO: 0.02 X10(3) UL (ref 0–0.2)
BASOPHILS NFR BLD AUTO: 0.2 %
BUN BLD-MCNC: 18 MG/DL (ref 9–23)
CALCIUM BLD-MCNC: 10.2 MG/DL (ref 8.7–10.4)
CHLORIDE SERPL-SCNC: 102 MMOL/L (ref 98–112)
CO2 SERPL-SCNC: 30 MMOL/L (ref 21–32)
CREAT BLD-MCNC: 0.86 MG/DL
EGFRCR SERPLBLD CKD-EPI 2021: 66 ML/MIN/1.73M2 (ref 60–?)
EOSINOPHIL # BLD AUTO: 0.05 X10(3) UL (ref 0–0.7)
EOSINOPHIL NFR BLD AUTO: 0.5 %
ERYTHROCYTE [DISTWIDTH] IN BLOOD BY AUTOMATED COUNT: 18.7 %
GLUCOSE BLD-MCNC: 129 MG/DL (ref 70–99)
GLUCOSE BLD-MCNC: 130 MG/DL (ref 70–99)
HCT VFR BLD AUTO: 30.1 %
HGB BLD-MCNC: 9.6 G/DL
IMM GRANULOCYTES # BLD AUTO: 0.02 X10(3) UL (ref 0–1)
IMM GRANULOCYTES NFR BLD: 0.2 %
LYMPHOCYTES # BLD AUTO: 2.3 X10(3) UL (ref 1–4)
LYMPHOCYTES NFR BLD AUTO: 25.2 %
MCH RBC QN AUTO: 26.8 PG (ref 26–34)
MCHC RBC AUTO-ENTMCNC: 31.9 G/DL (ref 31–37)
MCV RBC AUTO: 84.1 FL
MONOCYTES # BLD AUTO: 1.07 X10(3) UL (ref 0.1–1)
MONOCYTES NFR BLD AUTO: 11.7 %
NEUTROPHILS # BLD AUTO: 5.65 X10 (3) UL (ref 1.5–7.7)
NEUTROPHILS # BLD AUTO: 5.65 X10(3) UL (ref 1.5–7.7)
NEUTROPHILS NFR BLD AUTO: 62.2 %
OSMOLALITY SERPL CALC.SUM OF ELEC: 294 MOSM/KG (ref 275–295)
PLATELET # BLD AUTO: 184 10(3)UL (ref 150–450)
POTASSIUM SERPL-SCNC: 3.3 MMOL/L (ref 3.5–5.1)
RBC # BLD AUTO: 3.58 X10(6)UL
SODIUM SERPL-SCNC: 140 MMOL/L (ref 136–145)
WBC # BLD AUTO: 9.1 X10(3) UL (ref 4–11)

## 2024-09-14 PROCEDURE — 97161 PT EVAL LOW COMPLEX 20 MIN: CPT

## 2024-09-14 PROCEDURE — 82962 GLUCOSE BLOOD TEST: CPT

## 2024-09-14 PROCEDURE — 97530 THERAPEUTIC ACTIVITIES: CPT

## 2024-09-14 PROCEDURE — 85025 COMPLETE CBC W/AUTO DIFF WBC: CPT

## 2024-09-14 PROCEDURE — 80048 BASIC METABOLIC PNL TOTAL CA: CPT

## 2024-09-14 RX ORDER — SPIRONOLACTONE 25 MG/1
25 TABLET ORAL DAILY
Status: SHIPPED | COMMUNITY
Start: 2024-09-14

## 2024-09-14 RX ORDER — CLOPIDOGREL BISULFATE 75 MG/1
75 TABLET ORAL DAILY
Status: SHIPPED | COMMUNITY
Start: 2024-09-15 | End: 2024-09-14

## 2024-09-14 RX ORDER — PREDNISOLONE ACETATE 10 MG/ML
1 SUSPENSION/ DROPS OPHTHALMIC DAILY
Status: SHIPPED | COMMUNITY
Start: 2024-09-14

## 2024-09-14 RX ORDER — POTASSIUM CHLORIDE 1500 MG/1
40 TABLET, EXTENDED RELEASE ORAL EVERY 4 HOURS
Status: DISCONTINUED | OUTPATIENT
Start: 2024-09-14 | End: 2024-09-14

## 2024-09-14 RX ORDER — FUROSEMIDE 40 MG
40 TABLET ORAL 2 TIMES DAILY
Status: SHIPPED | COMMUNITY
Start: 2024-09-14

## 2024-09-14 RX ORDER — CARBIDOPA AND LEVODOPA 25; 100 MG/1; MG/1
1 TABLET ORAL 3 TIMES DAILY
Status: SHIPPED | COMMUNITY
Start: 2024-09-14

## 2024-09-14 RX ORDER — CLOPIDOGREL BISULFATE 75 MG/1
75 TABLET ORAL DAILY
Qty: 30 TABLET | Refills: 0 | Status: SHIPPED | OUTPATIENT
Start: 2024-09-15

## 2024-09-14 RX ORDER — GABAPENTIN 300 MG/1
300 CAPSULE ORAL NIGHTLY
Status: SHIPPED | COMMUNITY
Start: 2024-09-14

## 2024-09-14 RX ADMIN — METOPROLOL SUCCINATE 100 MG: 100 TABLET, EXTENDED RELEASE ORAL at 05:51:00

## 2024-09-14 RX ADMIN — FUROSEMIDE 40 MG: 20 MG TABLET ORAL at 10:42:00

## 2024-09-14 RX ADMIN — CLOPIDOGREL BISULFATE 75 MG: 75 TABLET ORAL at 10:42:00

## 2024-09-14 RX ADMIN — POTASSIUM CHLORIDE 40 MEQ: 1500 TABLET, EXTENDED RELEASE ORAL at 10:42:00

## 2024-09-14 RX ADMIN — SPIRONOLACTONE 25 MG: 25 TABLET ORAL at 10:42:00

## 2024-09-14 NOTE — PHYSICAL THERAPY NOTE
PHYSICAL THERAPY EVALUATION - INPATIENT     Room Number: 2622/2622-A  Evaluation Date: 2024  Type of Evaluation: Initial  Physician Order: PT Eval and Treat    Presenting Problem: s/p watchman procedure 24  Co-Morbidities : CVA, C2 and C5 fracture, HTN, OA, PD, RA, pulmonary HTN, DM, B KWAKU, R TKA  Reason for Therapy: Mobility Dysfunction and Discharge Planning    PHYSICAL THERAPY ASSESSMENT   Patient is a 84 year old female admitted 2024 for elective watchman procedure.   Patient is currently functioning at baseline with bed mobility, transfers, and maintaining seated position. Prior to admission, patient's baseline is mod ind for squat pivot transfers to/from w/c.     Patient is functioning at baseline and does not have skilled PT needs upon DC.     PLAN  Patient has been evaluated and presents with no skilled Physical Therapy needs at this time.  Patient discharged from Physical Therapy services.  Please re-order if a new functional limitation presents during this admission.    GOALS  Patient was able to achieve the following goals ...    Patient was able to transfer Safely and mod ind   Patient able to ambulate on level surfaces Unable before admission         HOME SITUATION  Type of Home: Assisted living facility   Home Layout: One level                Lives With: Staff 24 hours     Patient Owned Equipment: Wheelchair;Rolling walker;Hospital bed       Prior Level of Zap: Pt is typically mod ind for bed mobility and squat pivot transfers to/from w/c. Pt gets assist with showers.     SUBJECTIVE  \"I need to get home, I am watching after the [employer] guinea pig\"       OBJECTIVE  Precautions: Bed/chair alarm;Hard of hearing  Fall Risk: Standard fall risk    WEIGHT BEARING RESTRICTION  Weight Bearing Restriction: None                PAIN ASSESSMENT  Ratin          COGNITION  Overall Cognitive Status:  WFL - within functional limits    RANGE OF MOTION AND STRENGTH ASSESSMENT  Upper  extremity ROM and strength are within functional limits     Lower extremity ROM is within functional limits     Lower extremity strength is within functional limits       BALANCE  Static Sitting: Good  Dynamic Sitting: Good  Static Standing: Fair -  Dynamic Standing: Fair -    ADDITIONAL TESTS                                    ACTIVITY TOLERANCE   SPO2 94% on RA                      O2 WALK       NEUROLOGICAL FINDINGS                        AM-PAC '6-Clicks' INPATIENT SHORT FORM - BASIC MOBILITY  How much difficulty does the patient currently have...  Patient Difficulty: Turning over in bed (including adjusting bedclothes, sheets and blankets)?: None   Patient Difficulty: Sitting down on and standing up from a chair with arms (e.g., wheelchair, bedside commode, etc.): None   Patient Difficulty: Moving from lying on back to sitting on the side of the bed?: None   How much help from another person does the patient currently need...   Help from Another: Moving to and from a bed to a chair (including a wheelchair)?: None   Help from Another: Need to walk in hospital room?: Total   Help from Another: Climbing 3-5 steps with a railing?: Total       AM-PAC Score:  Raw Score: 18   Approx Degree of Impairment: 46.58%   Standardized Score (AM-PAC Scale): 43.63   CMS Modifier (G-Code): CK    FUNCTIONAL ABILITY STATUS  Gait Assessment   Functional Mobility/Gait Assessment  Gait Assistance: Not tested (does not ambulate at baseline)    Skilled Therapy Provided: Per RN okay to work with pt. Pt received in supine and was agreeable to PT session.     Bed Mobility:  Rolling: NT  Supine to sit: mod ind    Sit to supine: NT     Transfer Mobility:  Squat Pivot Transfer: Pt completed squat pivot transfer from bed to her own w/c mod ind     Therapist's comments:Pt educated on role of therapy, goals for session, safety, fall prevention, and activity recommendations.     Exercise/Education Provided:  Bed mobility  Energy  conservation  Functional activity tolerated  Posture  Strengthening  Transfer training    Patient End of Session: Up in chair;Needs met;Call light within reach;RN aware of session/findings;All patient questions and concerns addressed;Alarm set    Patient Evaluation Complexity Level:  History Moderate - 1 or 2 personal factors and/or co-morbidities   Examination of body systems Low -  addressing 1-2 elements   Clinical Presentation Low- Stable   Clinical Decision Making Low Complexity       PT Session Time: 25 minutes  Therapeutic Activity: 8 minutes

## 2024-09-14 NOTE — CONSULTS
[facility] General Medicine Consult      Reason for consult:  Post op medical management     Consulted by: Dr. Luther    PCP: Norm Mendoza MD      History of Present Illness: Patient is a 84 year old female with PMH sig for Parkinson's disease, RA, CVA, DM II, paroxysmal a-fib, and GERD who presents s/p LAAO (Watchman).  She tolerated the procedure well without immediate complications.  Denies chest pain or SOB. No F/C, N/V.     PMH:  Past Medical History:    Arrhythmia    C2 cervical fracture (HCC)    C5 vertebral fracture (HCC)    CVA (cerebral infarction)    x2    GERD (gastroesophageal reflux disease)    HTN (hypertension)    SHANA (obstructive sleep apnea)    Osteoarthritis    Overactive bladder    Parkinson's disease (HCC)    Pulmonary hypertension (HCC)    RA (rheumatoid arthritis) (HCC)    Type II or unspecified type diabetes mellitus without mention of complication, not stated as uncontrolled    Unspecified sleep apnea    AHI 78 RDI 80 SaO2 deirdre 85 % CPAP 11  Sleep RX/ now HME    Vitamin D deficiency    Vitamin D deficiency        PSH:  Past Surgical History:   Procedure Laterality Date    Cholecystectomy      Hernia surgery      Hip replacement surgery Bilateral     Knee replacement surgery Right     Other surgical history      \"Veins removed\"    Removal of heel spur Right     Tonsillectomy          Home Medications:  Outpatient Medications Marked as Taking for the 24 encounter (Hospital Encounter) with  IVS CATH   Medication Sig Dispense Refill    cephALEXin 500 MG Oral Cap Take 1 capsule (500 mg total) by mouth 3 (three) times daily. For 7 days only.      fluticasone-salmeterol 115-21 MCG/ACT Inhalation Aerosol Inhale into the lungs 2 (two) times daily.      [] furosemide 40 MG Oral Tab Take 1 tablet (40 mg total) by mouth 2 (two) times daily. 60 tablet 0    [] spironolactone 25 MG Oral Tab Take 1 tablet (25 mg total) by mouth daily. 30 tablet 0    []  sacubitril-valsartan 24-26 MG Oral Tab Take 1 tablet by mouth 2 (two) times daily. 60 tablet 0    pantoprazole 40 MG Oral Tab EC Take 1 tablet (40 mg total) by mouth every morning before breakfast.      dextromethorphan-guaiFENesin  MG/5ML Oral Syrup Take 5 mL by mouth every 12 (twelve) hours as needed.      acetaminophen 500 MG Oral Tab Take 1 tablet (500 mg total) by mouth in the morning and 1 tablet (500 mg total) before bedtime. 1100 & 1600.      cyanocobalamin 1000 MCG/ML Injection Solution Inject 1 mL (1,000 mcg total) into the muscle every 30 (thirty) days. On the 1st of every month      folic acid 1 MG Oral Tab Take 2 tablets (2 mg total) by mouth daily. 1400      glimepiride 2 MG Oral Tab Take 1 tablet (2 mg total) by mouth daily with breakfast.      metFORMIN 500 MG Oral Tab Take 2 tablets (1,000 mg total) by mouth 2 (two) times daily with meals.      fluticasone propionate 50 MCG/ACT Nasal Suspension 2 sprays by Each Nare route daily.      ERGOCALCIFEROL 1.25 MG (24856 UT) Oral Cap TAKE 1 CAPSULE BY MOUTH EVERY 14 DAYS 6 capsule 1    carbidopa-levodopa  MG Oral Tab Take 1 tablet by mouth 3 (three) times daily. (Patient taking differently: Take 1 tablet by mouth 3 (three) times daily. At 6am, 2pm, 7pm) 270 tablet 3    METOPROLOL SUCCINATE 100 MG Oral Tablet 24 Hr TAKE 1 TABLET(100 MG) BY MOUTH TWICE DAILY 180 tablet 0    prednisoLONE acetate 1 % Ophthalmic Suspension Place 1 drop into the left eye 4 (four) times daily. (Patient taking differently: Place 1 drop into the left eye daily.) 5 mL 0    Sertraline HCl 50 MG Oral Tab Take 1 tablet (50 mg total) by mouth daily. 30 tablet 3    gabapentin 300 MG Oral Cap Take 1 capsule (300 mg total) by mouth 2 (two) times a day. At 7pm & 9pm. (Patient taking differently: Take 1 capsule (300 mg total) by mouth nightly. At 7pm & 9pm.) 180 capsule 3    SIMVASTATIN 20 MG Oral Tab TAKE 1 TABLET BY MOUTH EVERY DAY 90 tablet 3    ASPIRIN LOW DOSE 81 MG Oral Tab  EC TAKE 1 TABLET BY MOUTH TWICE DAILY 180 tablet 3       Scheduled Medication:   insulin aspart  2-10 Units Subcutaneous TID AC and HS    potassium chloride  40 mEq Oral Q4H    aspirin  81 mg Oral Nightly    clopidogrel  75 mg Oral Daily    furosemide  40 mg Oral BID (Diuretic)    metoprolol succinate ER  100 mg Oral 2x Daily(Beta Blocker)    sacubitril-valsartan  1 tablet Oral BID    atorvastatin  10 mg Oral Nightly    spironolactone  25 mg Oral Daily    melatonin  1 mg Oral Nightly     Continuous Infusing Medication:   sodium chloride 20 mL/hr at 09/13/24 1315    sodium chloride 50 mL/hr at 09/13/24 1651     PRN Medication:  acetaminophen **OR** acetaminophen-codeine **OR** acetaminophen-codeine     ALL:  Allergies   Allergen Reactions    Naproxen OTHER (SEE COMMENTS)     headaches    Adhesive Tape RASH     States makes skin \"raw\"    Chocolate RASH    Strawberries RASH        Soc Hx:  Social History     Tobacco Use    Smoking status: Never    Smokeless tobacco: Never   Substance Use Topics    Alcohol use: No     Alcohol/week: 0.0 standard drinks of alcohol        Fam Hx  Family History   Problem Relation Age of Onset    Other (Other) Mother         RA    Other (Other) Sister         RA    Other (mitral valve prolapse) Sister     Other (Psoriasis) Brother        Review of Systems  Comprehensive ROS reviewed and negative except for what's stated above.  Including negative for chest pain, shortness of breath, syncope.       OBJECTIVE:  /63 (BP Location: Left arm)   Pulse 71   Temp 98.3 °F (36.8 °C) (Oral)   Resp 21   Ht 5' 4\" (1.626 m)   Wt 182 lb 5.1 oz (82.7 kg)   SpO2 98%   BMI 31.30 kg/m²   Gen: No acute distress, alert and oriented x3, no focal neurologic deficits. Elderly.   HEENT:  EOMI, PERRLA, OP clear, MMM  Pulm: Lungs clear bilaterally, normal respiratory effort  CV: Heart with regular rate and rhythm, no murmur.  Normal PMI.    Abd: Abdomen soft, nontender, nondistended, no organomegaly,  bowel sounds present  MSK: Full range of motion in extremities, no clubbing, no cyanosis  Skin: no rashes or lesions  Neuro:  Grossly intact, no sensory deficits     Diagnostics:   CBC/Chem  Recent Labs   Lab 09/10/24  0541 24  1529 24  0748   WBC 8.4 8.2 9.1   HGB 10.6* 10.8* 9.6*   MCV 84.9 85.4 84.1   .0 209.0 184.0   INR  --  1.64*  --        Recent Labs   Lab 09/10/24  0541 24  1529 24  0748    139 140   K 4.0 3.9 3.3*    103 102   CO2 32.0 26.0 30.0   BUN 23 16 18   CREATSERUM 0.89 0.94 0.86   GLU 74 195* 129*   CA 9.9 9.9 10.2       Recent Labs   Lab 09/10/24  0541   ALT 10   AST 19   ALB 4.1       Radiology: CARD ECHO LIMITED (CPT=93308/33969/71340)    Result Date: 2024  Transthoracic Echocardiogram Name:Sandi Linder Date: 2024 :  1940 Ht:  (64in)  BP: 171 / 86 MRN:  [id number]    Age:  84years    Wt:  (178lb) HR: 64bpm Loc:  [facility]       Gndr: F          BSA: 1.86m^2 Sonographer: Trini YAN [facility] Ordering:    Gene Luther MD Consulting:  Remington Hayes Referring:   Gene Luther ---------------------------------------------------------------------------- History/Indications:   Atrial fibrillation. Prior Watchman (35mm Watchman FLX 24). ---------------------------------------------------------------------------- Procedure information:  A transthoracic echocardiogram, limited study was performed. Additional evaluation included M-mode, limited 2D, and color Doppler.  Patient status:  Inpatient.  Location:  Bedside.    Comparison was made to the study of 06/15/2024.    This was a routine study. Transthoracic echocardiography for post intervention evaluation. Image quality was adequate. ECG rhythm:   Atrial fibrillation ---------------------------------------------------------------------------- Conclusions: 1. Left ventricle: The cavity size was normal. Wall thickness was at the    upper limits of normal. Systolic function was normal. The  estimated    ejection fraction was 60-65%, by visual assessment. 2. Left atrium: The left atrial volume was mildly to moderately increased. 3. Right atrium: The atrium was mildly dilated. 4. Atrial septum: There was a possible small residual atrial shunt    post-transseptal catheterization. 5. Mitral valve: There was mild regurgitation. 6. Pericardium, extracardiac: No significant pericardial effusion was    identified. Impressions:  This study is compared with previous dated 06/15/2024: No significant change since prior study. * ---------------------------------------------------------------------------- * Findings: Left ventricle:  The cavity size was normal. Wall thickness was at the upper limits of normal. Systolic function was normal. The estimated ejection fraction was 60-65%, by visual assessment. Left atrium:  The left atrial volume was mildly to moderately increased. Right ventricle:  The cavity size was normal. Systolic function was normal. Right atrium:  The atrium was mildly dilated. Mitral valve:  The valve was structurally normal. Leaflet separation was normal.  Doppler:  There was mild regurgitation. Aortic valve:  The valve was structurally normal. The valve was trileaflet. Cusp separation was normal. Tricuspid valve:  The valve is structurally normal. Leaflet separation was normal. Pericardium:   No significant pericardial effusion was identified. Aorta: Aortic root: The aortic root was normal. Systemic veins:  Central venous respirophasic diameter changes are in the normal range (>50%). Inferior vena cava: The IVC was normal-sized. Atrial septum:  There was a possible small residual atrial shunt post-transseptal catheterization. ---------------------------------------------------------------------------- Measurements  Left ventricle         Value        Ref       06/15/2024  IVS thickness, ED, (H) 1.0   cm     0.6 - 0.9 0.9  PLAX  LV ID, ED, PLAX        4.2   cm     3.8 - 5.2 4.6  LV ID, ES, PLAX         2.9   cm     2.2 - 3.5 3.1  LV PW thickness,   (H) 1.0   cm     0.6 - 0.9 1.0  ED, PLAX  IVS/LV PW ratio,       1.05         --------- 0.84  ED, PLAX  LV PW/LV ID ratio,     0.23         --------- 0.23  ED, PLAX  LV ejection            60    %      54 - 74   59  fraction  Aortic root            Value        Ref       06/15/2024  Aortic root ID, ED     3.1   cm     2.6 - 4.0 2.8  Left atrium            Value        Ref       06/15/2024  LA ID, A-P, ES     (H) 4.0   cm     2.7 - 3.8 4.7  LA volume, S       (H) 85    ml     22 - 52   66  LA volume/bsa, S   (H) 46    ml/m^2 16 - 34   35  LA volume, ES, 1-p (H) 81    ml     22 - 52   65  A4C  LA volume, ES, 1-p (H) 90    ml     22 - 52   64  A2C  LA volume, ES, A/L     89    ml     --------- 70  LA volume/bsa, ES, (H) 48    ml/m^2 16 - 34   37  A/L  LA/aortic root         1.29         --------- 1.68  ratio Legend: (L)  and  (H)  tonya values outside specified reference range. ---------------------------------------------------------------------------- Prepared and electronically signed by Compa Pablo 09/13/2024 15:39     CATH WATCHMAN    Result Date: 9/13/2024  Gene Luther MD     9/13/2024 12:26 PM Left Atrial Appendage Occlusion Gene Luther MD    Pre-diagnosis: PAF Post-diagnosis: S/P LAAO Procedure: LAAO : Ashkan TORRES DOS: 9/13/24   Procedural Details: We used a microkit for venous access.  We \"preclosed\" the vein.  We used a BS modifiable curve [place] sheath with dilator and [place] transseptal wire.  We went mid and mid for puncture; we spontaneously crossed.  We did an angio through the sheath.  We used a 35mm Watchman Flex device and deployed it in the standard fashion.  We did a tug test.  We measured compression.  We did a post deployment angio.  We had 15-22% compression. We met PASS criteria.  We released the device. The device was stable and we used our perclose for hemostasis.  Complications: None.  Conclusions: s/p LAAO (Watchman) deployed  successfully.  Follow up per protocol.  Plan discharge today.  Gene Luther MD     CARD ECHO NOVA STRUCTURAL HEART(CPT-18109)    Result Date: 9/13/2024  Jam Ghosh MD     9/13/2024 12:22 PM Transesophageal Echocardiogram Report Interventional NOVA (CPT code 22529) was performed for guidance of left atrial appendage closure. The maximal width of the left atrial appendage ostium was 27 mm. Left ventricular ejection fraction was 55-60%. There was no pericardial effusion. No thrombus was noted. A 35 mm Watchman FLX device was implanted in the left atrial appendage via transseptal approach. NOVA was used to safely guide puncture of the interatrial septum. The Watchman FLX device showed appropriate compression and no leak. No complications were noted at the end of the procedure. Jam Ghosh MD          ASSESSMENT / PLAN:    84 yr old female with PMH sig for Parkinson's disease, RA, CVA, DM II, paroxysmal a-fib, and GERD who presents s/p LAAO (Watchman).     # Paroxysmal a-fib  S/p LAAO (Watchman) placement   Postop care per cards  Cont ASA and plavix per cards    # Chronic diastolic HF  Compensated   Cont lasix  Monitor volume status     # Essential HTN  # HLD  Controlled  Cont home medications     # Parkinson's disease   cont home carbidopa/levadopa     # RA  - cont remicaide infusions as outpt    # DM II with hyperglycemia   - hold po DM meds  - ISS with accuchecks     Greater than 76 minutes spent on care of this complex pt, more than 50% face to face time.     Advanced Care Planning  While discussing goals of care with pt, Sandi voluntarily participated in an advanced care planning discussion.  Additionally, her daughter Liz participated in the conversation.  The following was discussed: POA and code status.  Her daughter Liz confirms she has pt's healthcare POA  She confirms DNAR/select status.   17 minutes were spent discussing advanced care planning.  This time was exclusive of the documented time  for this visit.     Outpatient records reviewed confirming patient's medical history and medications.     Further recommendations pending patient's clinical course.  [facility] hospitalist to continue to follow patient while in house    Patient and/or patient's family given opportunity to ask questions and note understanding and agreeing with therapeutic plan as outlined      Thank you for allowing me to participate in the care of this patient.     Thank You,    Alex Butler DO  [facility] Hospitalist  Answering Service number: [phone]

## 2024-09-14 NOTE — PLAN OF CARE
Received care of pt at 1900. A&Ox4. Pt denies any pain. O2 saturations above 90% on RA while awake, 2L NC while sleeping. Acapella Flutter used to help with phlegm. Afib on tele. Pt denies any chest pain or shortness of breath. Incontinent of bladder, purewick in place. Continent of bowel, last BM 9/12/24. Right groin soft, no hematoma/bleeding. Peripheral  Pulses 2+. 2 Person Skin check done with PCT Sue, excoriated skin along folds under B/L breast, topical ointment applied. Excoriated bottom-topical ointment and mepilex applied.     Bed is locked and in low position. Call light and personal items within reach.  Pt updated with plan of care       Problem: Patient/Family Goals  Goal: Patient/Family Long Term Goal  Description: Patient's Long Term Goal: Stay out of Hospital    Interventions:  - Medication Compliance  -Follow Up Apts   - See additional Care Plan goals for specific interventions  Outcome: Progressing  Goal: Patient/Family Short Term Goal  Description: Patient's Short Term Goal: Discharge home    Interventions:   - Clearance from MD's   - See additional Care Plan goals for specific interventions  Outcome: Progressing

## 2024-09-14 NOTE — PLAN OF CARE
Pt and VS have remained stable throughout shift. Denies CP/SOB. Right groin appears stable no bleed/hematoma noted. Sats maintained above 96% on room air.  Ok to discharge home per cards and primary. POC updated with pt and family at bedside. Up in wheel chair nahum well.   SW contacted regarding transfer.   Home via medicar pt and VS stable at discharge. Spoke with pt home health RN she is aware of pt discharge.   Pt was in her own wheel chair at discharge.

## 2024-09-14 NOTE — H&P
[facility]  Progress Note    Sandi Linder Patient Status:  Inpatient    1940 MRN [id number]   Location [facility] INTERVENTIONAL SUITES Attending Gene Luther MD   Hosp Day # 1 PCP Norm Mendoza MD     Impression:  pAF, s/p LAAO device 24      Plan:   Antiplatelet therapy   Discussed with family; ok for discharge from my point of view      Subjective:  No chest pain or dyspnea.      Objective:  /63 (BP Location: Left arm)   Pulse 71   Temp 98.3 °F (36.8 °C) (Oral)   Resp 21   Ht 5' 4\" (1.626 m)   Wt 182 lb 5.1 oz (82.7 kg)   SpO2 98%   BMI 31.30 kg/m²   Temp (24hrs), Av.2 °F (36.8 °C), Min:97.4 °F (36.3 °C), Max:99.3 °F (37.4 °C)      Intake/Output Summary (Last 24 hours) at 2024 1159  Last data filed at 2024 0900  Gross per 24 hour   Intake 240 ml   Output 850 ml   Net -610 ml     Wt Readings from Last 3 Encounters:   24 182 lb 5.1 oz (82.7 kg)   24 179 lb (81.2 kg)   24 177 lb 11.1 oz (80.6 kg)       General:Awake and alert; in acute distress  Cardiac: Regular rate and irregular rhythm; no murmurs, no rubs/gallops are appreciated  Lungs: Clear to auscultation bilaterally; no accessory muscle use  Abdomen: Soft, non-tender; bowel sounds are normoactive  Extremities: No clubbing/cyanosis; moves all 4 extremities to command equally, pedal pulses + 2;  no BLE edema      Current Facility-Administered Medications   Medication Dose Route Frequency    insulin aspart (NovoLOG) 100 Units/mL FlexPen 2-10 Units  2-10 Units Subcutaneous TID AC and HS    potassium chloride (Klor-Con M20) tab 40 mEq  40 mEq Oral Q4H    sodium chloride 0.9% infusion   Intravenous Continuous    sodium chloride 0.9% infusion   Intravenous Continuous    acetaminophen (Tylenol) tab 650 mg  650 mg Oral Q4H PRN    Or    acetaminophen-codeine (Tylenol #3) 300-30 MG per tab 1 tablet  1 tablet Oral Q4H PRN    Or    acetaminophen-codeine (Tylenol #3) 300-30 MG per tab 2 tablet  2 tablet  Oral Q4H PRN    aspirin DR tab 81 mg  81 mg Oral Nightly    clopidogrel (Plavix) tab 75 mg  75 mg Oral Daily    furosemide (Lasix) tab 40 mg  40 mg Oral BID (Diuretic)    metoprolol succinate ER (Toprol XL) 24 hr tab 100 mg  100 mg Oral 2x Daily(Beta Blocker)    sacubitril-valsartan (Entresto) 24-26 MG per tab 1 tablet  1 tablet Oral BID    atorvastatin (Lipitor) tab 10 mg  10 mg Oral Nightly    spironolactone (Aldactone) tab 25 mg  25 mg Oral Daily    melatonin tab 1 mg  1 mg Oral Nightly       CAN FILL IN DATA BELOW FROM CLINIC NOTE  Progress Notes  - documented in this encounter  Lisa Schmidt NP - 08/27/2024 1:20 PM CDT  Formatting of this note is different from the original.  Structural Heart Progress Note  LAAO Evaluation      HPI  Sandi Linder presents for discussion of LAAO Closure Procedure and to discuss alternatives to long-term anticoagulation.      Patient was seen by Dr. Luther for LAAO closure consult. He considered her a candidate.    The shared decision making form will be completed by Dr. Mayfield    Patient has a history of pAF.    Reason for Consultation: Non valvular paroxysmal Atrial Fibrillation, being evaluated for LAAO closure    Reason for seeking an alternative means to long term anticoagulation: recurrent falls with head injury in the past. Also has dx of parkinson's.    Patient has not been on anticoagulation given the history of above.    Post Procedure AC plan  DAPT - until 6 months  Lifetime ASA 81 mg      Patient has no c/o chest pain, tightness, or pressure. No dyspnea, PND, orthopnea. BLE edema stable on current regimen.  Denies palpitations, dizziness, or syncope.    The patient is tolerating medications with no reported side effects.    HX of DCCV or ablation: no  HX of blood transfusion: no  Previous Structural Heart Interventions: no  Previous REGGIE Intervention: no  HX of CM: no - Non ischemic, Ischemic, Restrictive, Hypertrophic, Other  HX of chronic lung disease: no  HX of  CAD: no  HX of sleep apea: yes  If yes to sleep apea, is RX followed: no    Social History  Tobacco Use  Smoking status: Never  Smokeless tobacco: Never  Vaping Use  Vaping status: Never Used  Alcohol use: No  Alcohol/week: 0.0 standard drinks of alcohol  Drug use: No    Past Medical History:  Diagnosis Date  C2 cervical fracture (HCC)  C5 vertebral fracture (HCC)  CVA (cerebral infarction)  x2  GERD (gastroesophageal reflux disease)  HTN (hypertension)  SHANA (obstructive sleep apnea)  Osteoarthritis  Overactive bladder  Parkinson's disease (HCC)  Pulmonary hypertension (HCC)  RA (rheumatoid arthritis) (HCC)  Type II or unspecified type diabetes mellitus without mention of complication, not stated as uncontrolled  Unspecified sleep apnea SPLIT 5-15-15  AHI 78 RDI 80 SaO2 deirdre 85 % CPAP 11 Sleep RX/ now HME  Vitamin D deficiency  Vitamin D deficiency    Past Surgical History:  Procedure Laterality Date  Cataract Bilateral 03/28/2014  Cholecystectomy  Hernia surgery  Hip replacement surgery Bilateral  Knee replacement surgery Right  Other surgical history  \"Veins removed\"  Removal of heel spur Right  Tonsillectomy    Denies any anesthesia complications including malignant hypothermia. Denies family HX of any anesthesia complication.  Denies any swallowing problems    Current Outpatient Medications:  White Petrolatum-Mineral Oil (SYSTANE NIGHTTIME) Ophthalmic Ointment, Apply 1 inch to eye at bedtime., Disp: 1 g, Rfl: 11  polypropylene glycol- (SYSTANE ULTRA PF) 0.4-0.3 % Ophthalmic Solution, Place 1 drop into the right eye in the morning, at noon, in the evening, and at bedtime., Disp: 5 mL, Rfl: 11  prednisoLONE 1 % Ophthalmic Suspension, Place 1 drop into the left eye daily., Disp: 5 mL, Rfl: 3  sacubitril-valsartan (ENTRESTO) 24-26 MG Oral Tab, Take 1 tablet by mouth 2 (two) times daily., Disp: 180 tablet, Rfl: 3  spironolactone 25 MG Oral Tab, Take 1 tablet (25 mg total) by mouth at bedtime., Disp: 90  tablet, Rfl: 3  carbidopa-levodopa  MG Oral Tab, Take 1 tablet by mouth 3 (three) times daily., Disp: 270 tablet, Rfl: 3  fluticasone-salmeterol (ADVAIR HFA) 115-21 MCG/ACT Inhalation Aerosol, Inhale 2 puffs into the lungs 2 (two) times daily., Disp: 3 each, Rfl: 3  pantoprazole 40 MG Oral Tab EC, Take 1 tablet (40 mg total) by mouth daily. Before meal, Disp: 60 tablet, Rfl: 0  benzonatate 200 MG Oral Cap, , Disp: , Rfl:  cyanocobalamin 1000 MCG/ML Injection Solution, , Disp: , Rfl:  fluconazole 100 MG Oral Tab, , Disp: , Rfl:  ADVAIR DISKUS 250-50 MCG/ACT Inhalation Aerosol Powder, Breath Activated, , Disp: , Rfl:  metFORMIN HCl 1000 MG Oral Tab, , Disp: , Rfl:  Nystatin 998673 UNIT/GM External Powder, , Disp: , Rfl:  potassium chloride 20 MEQ Oral Tab CR, , Disp: , Rfl:  triamcinolone 0.1 % External Cream, , Disp: , Rfl:  furosemide 40 MG Oral Tab, , Disp: , Rfl:  glimepiride 4 MG Oral Tab, , Disp: , Rfl:  ATROPINE 1 % Ophthalmic Solution, INSTILL 1 DROP IN AFFECTED EYE(S) TWICE DAILY, Disp: 5 mL, Rfl: 0  omeprazole 20 MG Oral Capsule Delayed Release, , Disp: , Rfl:  Omeprazole 40 MG Oral Capsule Delayed Release, Take 1 capsule (40 mg total) by mouth daily. Before meal, Disp: 30 capsule, Rfl: 11  AMLODIPINE 5 MG Oral Tab, TAKE 1 TABLET(5 MG) BY MOUTH TWICE DAILY, Disp: 180 tablet, Rfl: 3  fluticasone propionate 50 MCG/ACT Nasal Suspension, 1 spray by Each Nare route daily., Disp: , Rfl:  ERGOCALCIFEROL 1.25 MG (98604 UT) Oral Cap, TAKE 1 CAPSULE BY MOUTH EVERY 14 DAYS, Disp: 6 capsule, Rfl: 1  METOPROLOL SUCCINATE 100 MG Oral Tablet 24 Hr, TAKE 1 TABLET(100 MG) BY MOUTH TWICE DAILY, Disp: 180 tablet, Rfl: 0  Denosumab 60 MG/ML Subcutaneous Solution Prefilled Syringe, Inject 1 mL (60 mg total) into the skin once. Historical documentation - see Epic Immunization Activity for administration details, Disp: 1 mL, Rfl: 0  Cyclopentolate HCl (CYCLOGYL) 1 % Ophthalmic Solution, Apply 1 drop to eye 2 (two) times  daily. To affected eye, Disp: 1 Bottle, Rfl: 1  Sertraline HCl 50 MG Oral Tab, Take 1 tablet (50 mg total) by mouth daily., Disp: 30 tablet, Rfl: 3  gabapentin 300 MG Oral Cap, Take 1 capsule (300 mg total) by mouth 2 (two) times a day. At 7pm & 9pm., Disp: 180 capsule, Rfl: 3  Potassium Chloride ER 10 MEQ Oral Tab CR, Take 1 tablet (10 mEq total) by mouth daily., Disp: 90 tablet, Rfl: 3  SIMVASTATIN 20 MG Oral Tab, TAKE 1 TABLET BY MOUTH EVERY DAY, Disp: 90 tablet, Rfl: 3  ASPIRIN LOW DOSE 81 MG Oral Tab EC, TAKE 1 TABLET BY MOUTH TWICE DAILY, Disp: 180 tablet, Rfl: 3  FOLIC ACID 1 MG Oral Tab, TAKE 4 TABLETS(4 MG) BY MOUTH DAILY, Disp: 360 tablet, Rfl: 3  tiZANidine HCl 4 MG Oral Cap, Take 1 capsule (4 mg total) by mouth 3 (three) times daily., Disp: 30 capsule, Rfl: 11  losartan 100 MG Oral Tab, Take 1 tablet (100 mg total) by mouth once daily., Disp: 90 tablet, Rfl: 3  Ipratropium Bromide 0.06 % Nasal Solution, 2 sprays by Nasal route 3 (three) times daily., Disp: 1 Bottle, Rfl: 0  Insulin Pen Needle (BD PEN NEEDLE MINI U/F) 31G X 5 MM Does not apply Misc, Use one needle daily to administer forteo injections, Disp: 90 each, Rfl: 3  OXYBUTYNIN CHLORIDE 5 MG Oral Tab, TAKE 1/2 TABLET BY MOUTH TWICE DAILY, Disp: 90 tablet, Rfl: 3  MODAFINIL 100 MG Oral Tab, TAKE 1 TABLET BY MOUTH EVERY DAY., Disp: 90 tablet, Rfl: 2  Glucose Blood (ACCU-CHEK SMARTVIEW) In Vitro Strip, USE TO TEST THREE TIMES DAILY OR AS DIRECTED, Disp: 300 strip, Rfl: 2  ACCU-CHEK FASTCLIX LANCETS Does not apply Misc, TEST THREE TIMES DAILY OR AS DIRECTED, Disp: 306 each, Rfl: 1  Blood Glucose Monitoring Suppl (ACCU-CHEK ONELIA SMARTVIEW) W/DEVICE Does not apply Kit, Use to test blood sugar., Disp: 1 kit, Rfl: 0  OnabotulinumtoxinA (BOTOX IJ), Inject as directed. Every 3 months, Disp: , Rfl:  InFLIXimab (REMICADE IV), Inject into the vein. Every six weeks, Disp: , Rfl:    REVIEW OF SYSTEMS:  SEE HPI.  GENERAL: Denies fatigue  SKIN: denies any unusual  bruising or rashes  RESPIRATORY: Denies cough, dyspnea, orthopnea, wheezes or GARZA  CARDIOVASCULAR: Denies chest pains, pain in jaw, neck, shoulders, back or arms, palpitations  GI: Denies melena or bloating  : Denies hematuria  EXTREMITIES: Denies edema. Denies claudication.  NEURO: Denies pre syncope, syncope, lightheadedness, dizziness  PHYSICAL EXAM:  /70  Pulse 81  Ht 5' 2\" (1.575 m)  Wt 178 lb (80.7 kg)  SpO2 97%  BMI 32.56 kg/m²  Wt Readings from Last 2 Encounters:  08/27/24 : 178 lb (80.7 kg)  07/08/24 : 177 lb (80.3 kg)    GENERAL: well developed, well nourished, in no apparent distress  EYES: no jaundice  NECK: no JVD  RESPIRATORY: clear to auscultation  CARDIOVASCULAR: S1, S2 normal, Regular rate and irregular rhythm; no S3, no S4;  EXTREMITIES: no cyanosis, clubbing. Peripheral pulses intact. +1 BLE edema  DERM: no rashes. Skin is warm & dry  PSYCHE: cooperative  NEURO: Alert & oriented    DIAGNOSTICS:  Reviewed last cardiology note, cardiac labs    Pre-LAAC CTA Imaging  CONCLUSIONS:  1. 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 There is no  thrombus in the apex. The maximal diameter at the left atrial appendage  ostium is 28.9; the average diameter at the left atrial appendage ostium  is 26.7. The appendage depth distal to the landing zone is 17.3 mm. Based  upon these dimensions, left atrial appendage occlusion is BORDERLINE with  a 35 mm Watchman FLX device using a single FXD curve delivery sheath due  to limited depth. A 34 mm Amulet device appears more favorable.  2. The predicted fluoroscopic angle for optimal implantation is 35 degrees  of ANDREWS and 12 degrees of caudal angulation.  3. Transseptal puncture is recommended with a mid-inferior position along  the superior-inferior axis, and a mid position along the  anterior-posterior axis.  4. Please refer to the radiologist's interpretation of non-cardiac  structures.    Chaparro Iverson MD  6/18/2024      Laboratory  Data:  Lab Results   Component Value Date    WBC 9.1 09/14/2024    HGB 9.6 09/14/2024    HCT 30.1 09/14/2024    .0 09/14/2024     Lab Results   Component Value Date    INR 1.64 (H) 09/13/2024    INR 1.27 (H) 06/14/2024    INR 1.05 01/08/2022     Lab Results   Component Value Date     09/14/2024    K 3.3 09/14/2024     09/14/2024    CO2 30.0 09/14/2024    BUN 18 09/14/2024    CREATSERUM 0.86 09/14/2024     09/14/2024    CA 10.2 09/14/2024       Telemetry: atrial fib

## 2024-09-14 NOTE — CM/SW NOTE
09/14/24 1600   Discharge disposition   Expected discharge disposition Home or Self   Post Acute Care Provider [facility]   Discharge transportation Edward Robert QUEZADA notified by RN pt cleared to return to [facility]. [facility] transport scheduled for 4:30 PM, PCS completed.     [facility]:  [phone]     PHILLIP Thrasher

## 2024-09-14 NOTE — PROGRESS NOTES
09/14/24 0006 09/14/24 0007 09/14/24 0008   Oxygen Therapy   SpO2 (!) 85 % (!) 86 % (!) 86 %   O2 Device None (Room air) None (Room air) None (Room air)      09/14/24 0009 09/14/24 0010   Oxygen Therapy   SpO2 (!) 85 % (!) 86 %   O2 Device None (Room air) None (Room air)     O2 Sleep Study

## 2024-09-15 LAB
BLOOD TYPE BARCODE: 5100
UNIT VOLUME: 350 ML

## 2024-10-08 ENCOUNTER — LAB REQUISITION (OUTPATIENT)
Dept: LAB | Facility: HOSPITAL | Age: 84
End: 2024-10-08
Payer: MEDICARE

## 2024-10-08 DIAGNOSIS — M81.0 AGE-RELATED OSTEOPOROSIS WITHOUT CURRENT PATHOLOGICAL FRACTURE: ICD-10-CM

## 2024-10-08 LAB
ALBUMIN SERPL-MCNC: 4.3 G/DL (ref 3.2–4.8)
ALBUMIN/GLOB SERPL: 1.4 {RATIO} (ref 1–2)
ALP LIVER SERPL-CCNC: 93 U/L
ALT SERPL-CCNC: 13 U/L
ANION GAP SERPL CALC-SCNC: 7 MMOL/L (ref 0–18)
AST SERPL-CCNC: 17 U/L (ref ?–34)
BASOPHILS # BLD AUTO: 0.04 X10(3) UL (ref 0–0.2)
BASOPHILS NFR BLD AUTO: 0.5 %
BILIRUB SERPL-MCNC: 0.3 MG/DL (ref 0.2–1.1)
BUN BLD-MCNC: 15 MG/DL (ref 9–23)
CALCIUM BLD-MCNC: 9.3 MG/DL (ref 8.7–10.4)
CHLORIDE SERPL-SCNC: 104 MMOL/L (ref 98–112)
CO2 SERPL-SCNC: 29 MMOL/L (ref 21–32)
CREAT BLD-MCNC: 0.75 MG/DL
EGFRCR SERPLBLD CKD-EPI 2021: 77 ML/MIN/1.73M2 (ref 60–?)
EOSINOPHIL # BLD AUTO: 0.25 X10(3) UL (ref 0–0.7)
EOSINOPHIL NFR BLD AUTO: 3.1 %
ERYTHROCYTE [DISTWIDTH] IN BLOOD BY AUTOMATED COUNT: 19.6 %
FASTING STATUS PATIENT QL REPORTED: YES
GLOBULIN PLAS-MCNC: 3.1 G/DL (ref 2–3.5)
GLUCOSE BLD-MCNC: 80 MG/DL (ref 70–99)
HCT VFR BLD AUTO: 32.8 %
HGB BLD-MCNC: 10.3 G/DL
IMM GRANULOCYTES # BLD AUTO: 0.03 X10(3) UL (ref 0–1)
IMM GRANULOCYTES NFR BLD: 0.4 %
LYMPHOCYTES # BLD AUTO: 3.15 X10(3) UL (ref 1–4)
LYMPHOCYTES NFR BLD AUTO: 39.3 %
MCH RBC QN AUTO: 27.7 PG (ref 26–34)
MCHC RBC AUTO-ENTMCNC: 31.4 G/DL (ref 31–37)
MCV RBC AUTO: 88.2 FL
MONOCYTES # BLD AUTO: 0.9 X10(3) UL (ref 0.1–1)
MONOCYTES NFR BLD AUTO: 11.2 %
NEUTROPHILS # BLD AUTO: 3.64 X10 (3) UL (ref 1.5–7.7)
NEUTROPHILS # BLD AUTO: 3.64 X10(3) UL (ref 1.5–7.7)
NEUTROPHILS NFR BLD AUTO: 45.5 %
OSMOLALITY SERPL CALC.SUM OF ELEC: 290 MOSM/KG (ref 275–295)
PLATELET # BLD AUTO: 255 10(3)UL (ref 150–450)
POTASSIUM SERPL-SCNC: 3.3 MMOL/L (ref 3.5–5.1)
PROT SERPL-MCNC: 7.4 G/DL (ref 5.7–8.2)
RBC # BLD AUTO: 3.72 X10(6)UL
SODIUM SERPL-SCNC: 140 MMOL/L (ref 136–145)
VIT D+METAB SERPL-MCNC: 77 NG/ML (ref 30–100)
WBC # BLD AUTO: 8 X10(3) UL (ref 4–11)

## 2024-10-08 PROCEDURE — 82306 VITAMIN D 25 HYDROXY: CPT | Performed by: INTERNAL MEDICINE

## 2024-10-08 PROCEDURE — 80053 COMPREHEN METABOLIC PANEL: CPT | Performed by: INTERNAL MEDICINE

## 2024-10-08 PROCEDURE — 85025 COMPLETE CBC W/AUTO DIFF WBC: CPT | Performed by: INTERNAL MEDICINE

## 2024-10-15 ENCOUNTER — LAB REQUISITION (OUTPATIENT)
Dept: LAB | Facility: HOSPITAL | Age: 84
End: 2024-10-15
Payer: MEDICARE

## 2024-10-15 DIAGNOSIS — D64.9 ANEMIA, UNSPECIFIED: ICD-10-CM

## 2024-10-15 DIAGNOSIS — I10 ESSENTIAL (PRIMARY) HYPERTENSION: ICD-10-CM

## 2024-10-15 DIAGNOSIS — I50.9 HEART FAILURE, UNSPECIFIED (HCC): ICD-10-CM

## 2024-10-15 LAB
ALBUMIN SERPL-MCNC: 4.3 G/DL (ref 3.2–4.8)
ALBUMIN/GLOB SERPL: 1.3 {RATIO} (ref 1–2)
ALP LIVER SERPL-CCNC: 83 U/L
ALT SERPL-CCNC: 10 U/L
ANION GAP SERPL CALC-SCNC: 10 MMOL/L (ref 0–18)
AST SERPL-CCNC: 18 U/L (ref ?–34)
BASOPHILS # BLD AUTO: 0.03 X10(3) UL (ref 0–0.2)
BASOPHILS NFR BLD AUTO: 0.4 %
BILIRUB SERPL-MCNC: 0.4 MG/DL (ref 0.2–1.1)
BUN BLD-MCNC: 24 MG/DL (ref 9–23)
CALCIUM BLD-MCNC: 10.1 MG/DL (ref 8.7–10.4)
CHLORIDE SERPL-SCNC: 104 MMOL/L (ref 98–112)
CO2 SERPL-SCNC: 27 MMOL/L (ref 21–32)
CREAT BLD-MCNC: 0.86 MG/DL
EGFRCR SERPLBLD CKD-EPI 2021: 66 ML/MIN/1.73M2 (ref 60–?)
EOSINOPHIL # BLD AUTO: 0.16 X10(3) UL (ref 0–0.7)
EOSINOPHIL NFR BLD AUTO: 2 %
ERYTHROCYTE [DISTWIDTH] IN BLOOD BY AUTOMATED COUNT: 19.1 %
FASTING STATUS PATIENT QL REPORTED: YES
GLOBULIN PLAS-MCNC: 3.3 G/DL (ref 2–3.5)
GLUCOSE BLD-MCNC: 68 MG/DL (ref 70–99)
HCT VFR BLD AUTO: 33.6 %
HGB BLD-MCNC: 10.7 G/DL
IMM GRANULOCYTES # BLD AUTO: 0.02 X10(3) UL (ref 0–1)
IMM GRANULOCYTES NFR BLD: 0.3 %
LYMPHOCYTES # BLD AUTO: 3.79 X10(3) UL (ref 1–4)
LYMPHOCYTES NFR BLD AUTO: 47.8 %
MCH RBC QN AUTO: 27.6 PG (ref 26–34)
MCHC RBC AUTO-ENTMCNC: 31.8 G/DL (ref 31–37)
MCV RBC AUTO: 86.6 FL
MONOCYTES # BLD AUTO: 0.72 X10(3) UL (ref 0.1–1)
MONOCYTES NFR BLD AUTO: 9.1 %
NEUTROPHILS # BLD AUTO: 3.21 X10 (3) UL (ref 1.5–7.7)
NEUTROPHILS # BLD AUTO: 3.21 X10(3) UL (ref 1.5–7.7)
NEUTROPHILS NFR BLD AUTO: 40.4 %
OSMOLALITY SERPL CALC.SUM OF ELEC: 294 MOSM/KG (ref 275–295)
PLATELET # BLD AUTO: 261 10(3)UL (ref 150–450)
POTASSIUM SERPL-SCNC: 4.2 MMOL/L (ref 3.5–5.1)
PROT SERPL-MCNC: 7.6 G/DL (ref 5.7–8.2)
RBC # BLD AUTO: 3.88 X10(6)UL
SODIUM SERPL-SCNC: 141 MMOL/L (ref 136–145)
WBC # BLD AUTO: 7.9 X10(3) UL (ref 4–11)

## 2024-10-15 PROCEDURE — 80053 COMPREHEN METABOLIC PANEL: CPT | Performed by: INTERNAL MEDICINE

## 2024-10-15 PROCEDURE — 85025 COMPLETE CBC W/AUTO DIFF WBC: CPT | Performed by: INTERNAL MEDICINE

## 2024-10-17 ENCOUNTER — TELEPHONE (OUTPATIENT)
Dept: OTHER | Age: 84
End: 2024-10-17

## 2024-10-21 ENCOUNTER — TELEPHONE (OUTPATIENT)
Dept: OTHER | Age: 84
End: 2024-10-21

## 2024-10-22 ENCOUNTER — LAB REQUISITION (OUTPATIENT)
Dept: LAB | Facility: HOSPITAL | Age: 84
End: 2024-10-22
Payer: MEDICARE

## 2024-10-22 DIAGNOSIS — D64.9 ANEMIA, UNSPECIFIED: ICD-10-CM

## 2024-10-22 LAB
ALBUMIN SERPL-MCNC: 4.5 G/DL (ref 3.2–4.8)
ALBUMIN/GLOB SERPL: 1.5 {RATIO} (ref 1–2)
ALP LIVER SERPL-CCNC: 96 U/L
ALT SERPL-CCNC: <7 U/L
ANION GAP SERPL CALC-SCNC: 10 MMOL/L (ref 0–18)
AST SERPL-CCNC: 15 U/L (ref ?–34)
BASOPHILS # BLD AUTO: 0.05 X10(3) UL (ref 0–0.2)
BASOPHILS NFR BLD AUTO: 0.6 %
BILIRUB SERPL-MCNC: 0.3 MG/DL (ref 0.2–1.1)
BUN BLD-MCNC: 18 MG/DL (ref 9–23)
CALCIUM BLD-MCNC: 10.5 MG/DL (ref 8.7–10.4)
CHLORIDE SERPL-SCNC: 104 MMOL/L (ref 98–112)
CO2 SERPL-SCNC: 24 MMOL/L (ref 21–32)
CREAT BLD-MCNC: 0.79 MG/DL
EGFRCR SERPLBLD CKD-EPI 2021: 73 ML/MIN/1.73M2 (ref 60–?)
EOSINOPHIL # BLD AUTO: 0.3 X10(3) UL (ref 0–0.7)
EOSINOPHIL NFR BLD AUTO: 3.3 %
ERYTHROCYTE [DISTWIDTH] IN BLOOD BY AUTOMATED COUNT: 18.9 %
GLOBULIN PLAS-MCNC: 3 G/DL (ref 2–3.5)
GLUCOSE BLD-MCNC: 75 MG/DL (ref 70–99)
HCT VFR BLD AUTO: 32.5 %
HGB BLD-MCNC: 10.2 G/DL
IMM GRANULOCYTES # BLD AUTO: 0.01 X10(3) UL (ref 0–1)
IMM GRANULOCYTES NFR BLD: 0.1 %
LYMPHOCYTES # BLD AUTO: 4.49 X10(3) UL (ref 1–4)
LYMPHOCYTES NFR BLD AUTO: 49.4 %
MCH RBC QN AUTO: 27.3 PG (ref 26–34)
MCHC RBC AUTO-ENTMCNC: 31.4 G/DL (ref 31–37)
MCV RBC AUTO: 86.9 FL
MONOCYTES # BLD AUTO: 0.84 X10(3) UL (ref 0.1–1)
MONOCYTES NFR BLD AUTO: 9.2 %
NEUTROPHILS # BLD AUTO: 3.4 X10 (3) UL (ref 1.5–7.7)
NEUTROPHILS # BLD AUTO: 3.4 X10(3) UL (ref 1.5–7.7)
NEUTROPHILS NFR BLD AUTO: 37.4 %
OSMOLALITY SERPL CALC.SUM OF ELEC: 287 MOSM/KG (ref 275–295)
PLATELET # BLD AUTO: 292 10(3)UL (ref 150–450)
POTASSIUM SERPL-SCNC: 4.5 MMOL/L (ref 3.5–5.1)
PROT SERPL-MCNC: 7.5 G/DL (ref 5.7–8.2)
RBC # BLD AUTO: 3.74 X10(6)UL
SODIUM SERPL-SCNC: 138 MMOL/L (ref 136–145)
WBC # BLD AUTO: 9.1 X10(3) UL (ref 4–11)

## 2024-10-22 PROCEDURE — 80053 COMPREHEN METABOLIC PANEL: CPT | Performed by: INTERNAL MEDICINE

## 2024-10-22 PROCEDURE — 85025 COMPLETE CBC W/AUTO DIFF WBC: CPT | Performed by: INTERNAL MEDICINE

## 2024-10-31 ENCOUNTER — TELEPHONE (OUTPATIENT)
Dept: OTHER | Age: 84
End: 2024-10-31

## 2024-12-03 ENCOUNTER — LAB REQUISITION (OUTPATIENT)
Dept: LAB | Facility: HOSPITAL | Age: 84
End: 2024-12-03
Payer: MEDICARE

## 2024-12-03 DIAGNOSIS — M06.00 RHEUMATOID ARTHRITIS WITHOUT RHEUMATOID FACTOR, UNSPECIFIED SITE (HCC): ICD-10-CM

## 2024-12-03 DIAGNOSIS — Z79.899 OTHER LONG TERM (CURRENT) DRUG THERAPY: ICD-10-CM

## 2024-12-03 DIAGNOSIS — E11.21 TYPE 2 DIABETES MELLITUS WITH DIABETIC NEPHROPATHY (HCC): ICD-10-CM

## 2024-12-03 DIAGNOSIS — E11.42 TYPE 2 DIABETES MELLITUS WITH DIABETIC POLYNEUROPATHY (HCC): ICD-10-CM

## 2024-12-03 DIAGNOSIS — E11.3299 TYPE 2 DIABETES MELLITUS WITH MILD NONPROLIFERATIVE DIABETIC RETINOPATHY WITHOUT MACULAR EDEMA, UNSPECIFIED EYE (HCC): ICD-10-CM

## 2024-12-03 DIAGNOSIS — I10 ESSENTIAL (PRIMARY) HYPERTENSION: ICD-10-CM

## 2024-12-03 LAB
ALBUMIN SERPL-MCNC: 4.2 G/DL (ref 3.2–4.8)
ALP LIVER SERPL-CCNC: 94 U/L
ALT SERPL-CCNC: <7 U/L
ANION GAP SERPL CALC-SCNC: 8 MMOL/L (ref 0–18)
AST SERPL-CCNC: 20 U/L (ref ?–34)
BASOPHILS # BLD AUTO: 0.04 X10(3) UL (ref 0–0.2)
BASOPHILS NFR BLD AUTO: 0.5 %
BILIRUB DIRECT SERPL-MCNC: 0.1 MG/DL (ref ?–0.3)
BILIRUB SERPL-MCNC: 0.3 MG/DL (ref 0.2–1.1)
BUN BLD-MCNC: 21 MG/DL (ref 9–23)
BUN BLD-MCNC: 21 MG/DL (ref 9–23)
CALCIUM BLD-MCNC: 10.1 MG/DL (ref 8.7–10.4)
CHLORIDE SERPL-SCNC: 105 MMOL/L (ref 98–112)
CO2 SERPL-SCNC: 30 MMOL/L (ref 21–32)
CREAT BLD-MCNC: 0.88 MG/DL
CREAT BLD-MCNC: 0.88 MG/DL
CRP SERPL-MCNC: <0.4 MG/DL (ref ?–0.5)
EGFRCR SERPLBLD CKD-EPI 2021: 64 ML/MIN/1.73M2 (ref 60–?)
EGFRCR SERPLBLD CKD-EPI 2021: 64 ML/MIN/1.73M2 (ref 60–?)
EOSINOPHIL # BLD AUTO: 0.27 X10(3) UL (ref 0–0.7)
EOSINOPHIL NFR BLD AUTO: 3.3 %
ERYTHROCYTE [DISTWIDTH] IN BLOOD BY AUTOMATED COUNT: 16.1 %
ERYTHROCYTE [SEDIMENTATION RATE] IN BLOOD: 62 MM/HR
EST. AVERAGE GLUCOSE BLD GHB EST-MCNC: 148 MG/DL (ref 68–126)
GLUCOSE BLD-MCNC: 61 MG/DL (ref 70–99)
HBA1C MFR BLD: 6.8 % (ref ?–5.7)
HCT VFR BLD AUTO: 32.7 %
HGB BLD-MCNC: 10 G/DL
IMM GRANULOCYTES # BLD AUTO: 0.01 X10(3) UL (ref 0–1)
IMM GRANULOCYTES NFR BLD: 0.1 %
LYMPHOCYTES # BLD AUTO: 4.11 X10(3) UL (ref 1–4)
LYMPHOCYTES NFR BLD AUTO: 50.3 %
MCH RBC QN AUTO: 28 PG (ref 26–34)
MCHC RBC AUTO-ENTMCNC: 30.6 G/DL (ref 31–37)
MCV RBC AUTO: 91.6 FL
MONOCYTES # BLD AUTO: 0.83 X10(3) UL (ref 0.1–1)
MONOCYTES NFR BLD AUTO: 10.2 %
NEUTROPHILS # BLD AUTO: 2.91 X10 (3) UL (ref 1.5–7.7)
NEUTROPHILS # BLD AUTO: 2.91 X10(3) UL (ref 1.5–7.7)
NEUTROPHILS NFR BLD AUTO: 35.6 %
OSMOLALITY SERPL CALC.SUM OF ELEC: 297 MOSM/KG (ref 275–295)
PLATELET # BLD AUTO: 261 10(3)UL (ref 150–450)
POTASSIUM SERPL-SCNC: 4.4 MMOL/L (ref 3.5–5.1)
PROT SERPL-MCNC: 7.3 G/DL (ref 5.7–8.2)
RBC # BLD AUTO: 3.57 X10(6)UL
SODIUM SERPL-SCNC: 143 MMOL/L (ref 136–145)
WBC # BLD AUTO: 8.2 X10(3) UL (ref 4–11)

## 2024-12-03 PROCEDURE — 80076 HEPATIC FUNCTION PANEL: CPT | Performed by: INTERNAL MEDICINE

## 2024-12-03 PROCEDURE — 82565 ASSAY OF CREATININE: CPT | Performed by: INTERNAL MEDICINE

## 2024-12-03 PROCEDURE — 83036 HEMOGLOBIN GLYCOSYLATED A1C: CPT | Performed by: INTERNAL MEDICINE

## 2024-12-03 PROCEDURE — 86140 C-REACTIVE PROTEIN: CPT | Performed by: INTERNAL MEDICINE

## 2024-12-03 PROCEDURE — 84520 ASSAY OF UREA NITROGEN: CPT | Performed by: INTERNAL MEDICINE

## 2024-12-03 PROCEDURE — 85652 RBC SED RATE AUTOMATED: CPT | Performed by: INTERNAL MEDICINE

## 2024-12-03 PROCEDURE — 85025 COMPLETE CBC W/AUTO DIFF WBC: CPT | Performed by: INTERNAL MEDICINE

## 2024-12-03 PROCEDURE — 80048 BASIC METABOLIC PNL TOTAL CA: CPT | Performed by: INTERNAL MEDICINE

## 2025-01-21 ENCOUNTER — LAB REQUISITION (OUTPATIENT)
Dept: LAB | Facility: HOSPITAL | Age: 85
End: 2025-01-21
Payer: MEDICARE

## 2025-01-21 DIAGNOSIS — M80.00XD AGE-RELATED OSTEOPOROSIS WITH CURRENT PATHOLOGICAL FRACTURE, UNSPECIFIED SITE, SUBSEQUENT ENCOUNTER FOR FRACTURE WITH ROUTINE HEALING: ICD-10-CM

## 2025-01-21 LAB
ALBUMIN SERPL-MCNC: 4.1 G/DL (ref 3.2–4.8)
CALCIUM BLD-MCNC: 9.7 MG/DL (ref 8.7–10.6)
CREAT BLD-MCNC: 1.21 MG/DL
EGFRCR SERPLBLD CKD-EPI 2021: 44 ML/MIN/1.73M2 (ref 60–?)

## 2025-01-21 PROCEDURE — 82565 ASSAY OF CREATININE: CPT | Performed by: INTERNAL MEDICINE

## 2025-01-21 PROCEDURE — 82310 ASSAY OF CALCIUM: CPT | Performed by: INTERNAL MEDICINE

## 2025-01-21 PROCEDURE — 82040 ASSAY OF SERUM ALBUMIN: CPT | Performed by: INTERNAL MEDICINE

## 2025-02-27 ENCOUNTER — APPOINTMENT (OUTPATIENT)
Dept: CT IMAGING | Facility: HOSPITAL | Age: 85
End: 2025-02-27
Attending: HOSPITALIST
Payer: MEDICARE

## 2025-02-27 ENCOUNTER — LAB REQUISITION (OUTPATIENT)
Dept: LAB | Facility: HOSPITAL | Age: 85
End: 2025-02-27
Payer: MEDICARE

## 2025-02-27 ENCOUNTER — HOSPITAL ENCOUNTER (OUTPATIENT)
Facility: HOSPITAL | Age: 85
Setting detail: OBSERVATION
Discharge: NURSING, REHAB OR LONG-TERM CARE | End: 2025-03-01
Attending: EMERGENCY MEDICINE | Admitting: HOSPITALIST
Payer: MEDICARE

## 2025-02-27 ENCOUNTER — APPOINTMENT (OUTPATIENT)
Dept: GENERAL RADIOLOGY | Facility: HOSPITAL | Age: 85
End: 2025-02-27
Attending: EMERGENCY MEDICINE
Payer: MEDICARE

## 2025-02-27 DIAGNOSIS — D64.9 ANEMIA, UNSPECIFIED TYPE: Primary | ICD-10-CM

## 2025-02-27 DIAGNOSIS — D64.9 ANEMIA, UNSPECIFIED: ICD-10-CM

## 2025-02-27 DIAGNOSIS — E04.1 NONTOXIC SINGLE THYROID NODULE: ICD-10-CM

## 2025-02-27 LAB
ALBUMIN SERPL-MCNC: 4.1 G/DL (ref 3.2–4.8)
ALBUMIN SERPL-MCNC: 4.7 G/DL (ref 3.2–4.8)
ALBUMIN/GLOB SERPL: 1.3 {RATIO} (ref 1–2)
ALBUMIN/GLOB SERPL: 1.4 {RATIO} (ref 1–2)
ALP LIVER SERPL-CCNC: 74 U/L
ALP LIVER SERPL-CCNC: 91 U/L
ALT SERPL-CCNC: <7 U/L
ALT SERPL-CCNC: <7 U/L
ANION GAP SERPL CALC-SCNC: 5 MMOL/L (ref 0–18)
ANION GAP SERPL CALC-SCNC: 7 MMOL/L (ref 0–18)
ANTIBODY SCREEN: POSITIVE
AST SERPL-CCNC: 14 U/L (ref ?–34)
AST SERPL-CCNC: 15 U/L (ref ?–34)
BASOPHILS # BLD AUTO: 0.03 X10(3) UL (ref 0–0.2)
BASOPHILS # BLD AUTO: 0.04 X10(3) UL (ref 0–0.2)
BASOPHILS NFR BLD AUTO: 0.4 %
BASOPHILS NFR BLD AUTO: 0.5 %
BILIRUB SERPL-MCNC: 0.2 MG/DL (ref 0.2–1.1)
BILIRUB SERPL-MCNC: 0.2 MG/DL (ref 0.2–1.1)
BUN BLD-MCNC: 31 MG/DL (ref 9–23)
BUN BLD-MCNC: 31 MG/DL (ref 9–23)
CALCIUM BLD-MCNC: 10.2 MG/DL (ref 8.7–10.6)
CALCIUM BLD-MCNC: 10.3 MG/DL (ref 8.7–10.6)
CHLORIDE SERPL-SCNC: 100 MMOL/L (ref 98–112)
CHLORIDE SERPL-SCNC: 103 MMOL/L (ref 98–112)
CO2 SERPL-SCNC: 30 MMOL/L (ref 21–32)
CO2 SERPL-SCNC: 30 MMOL/L (ref 21–32)
CREAT BLD-MCNC: 1.15 MG/DL
CREAT BLD-MCNC: 1.23 MG/DL
EGFRCR SERPLBLD CKD-EPI 2021: 43 ML/MIN/1.73M2 (ref 60–?)
EGFRCR SERPLBLD CKD-EPI 2021: 46 ML/MIN/1.73M2 (ref 60–?)
EOSINOPHIL # BLD AUTO: 0.19 X10(3) UL (ref 0–0.7)
EOSINOPHIL # BLD AUTO: 0.23 X10(3) UL (ref 0–0.7)
EOSINOPHIL NFR BLD AUTO: 2.4 %
EOSINOPHIL NFR BLD AUTO: 3.1 %
ERYTHROCYTE [DISTWIDTH] IN BLOOD BY AUTOMATED COUNT: 15.8 %
ERYTHROCYTE [DISTWIDTH] IN BLOOD BY AUTOMATED COUNT: 15.9 %
FASTING STATUS PATIENT QL REPORTED: YES
GLOBULIN PLAS-MCNC: 2.9 G/DL (ref 2–3.5)
GLOBULIN PLAS-MCNC: 3.5 G/DL (ref 2–3.5)
GLUCOSE BLD-MCNC: 102 MG/DL (ref 70–99)
GLUCOSE BLD-MCNC: 113 MG/DL (ref 70–99)
HCT VFR BLD AUTO: 22.7 %
HCT VFR BLD AUTO: 25.2 %
HGB BLD-MCNC: 7 G/DL
HGB BLD-MCNC: 7.5 G/DL
IMM GRANULOCYTES # BLD AUTO: 0.02 X10(3) UL (ref 0–1)
IMM GRANULOCYTES # BLD AUTO: 0.02 X10(3) UL (ref 0–1)
IMM GRANULOCYTES NFR BLD: 0.3 %
IMM GRANULOCYTES NFR BLD: 0.3 %
LYMPHOCYTES # BLD AUTO: 2.62 X10(3) UL (ref 1–4)
LYMPHOCYTES # BLD AUTO: 3.46 X10(3) UL (ref 1–4)
LYMPHOCYTES NFR BLD AUTO: 33.3 %
LYMPHOCYTES NFR BLD AUTO: 46.4 %
MCH RBC QN AUTO: 26 PG (ref 26–34)
MCH RBC QN AUTO: 27 PG (ref 26–34)
MCHC RBC AUTO-ENTMCNC: 29.8 G/DL (ref 31–37)
MCHC RBC AUTO-ENTMCNC: 30.8 G/DL (ref 31–37)
MCV RBC AUTO: 87.2 FL
MCV RBC AUTO: 87.6 FL
MONOCYTES # BLD AUTO: 0.93 X10(3) UL (ref 0.1–1)
MONOCYTES # BLD AUTO: 1.12 X10(3) UL (ref 0.1–1)
MONOCYTES NFR BLD AUTO: 12.5 %
MONOCYTES NFR BLD AUTO: 14.2 %
NEUTROPHILS # BLD AUTO: 2.77 X10 (3) UL (ref 1.5–7.7)
NEUTROPHILS # BLD AUTO: 2.77 X10(3) UL (ref 1.5–7.7)
NEUTROPHILS # BLD AUTO: 3.88 X10 (3) UL (ref 1.5–7.7)
NEUTROPHILS # BLD AUTO: 3.88 X10(3) UL (ref 1.5–7.7)
NEUTROPHILS NFR BLD AUTO: 37.2 %
NEUTROPHILS NFR BLD AUTO: 49.4 %
OSMOLALITY SERPL CALC.SUM OF ELEC: 291 MOSM/KG (ref 275–295)
OSMOLALITY SERPL CALC.SUM OF ELEC: 293 MOSM/KG (ref 275–295)
PLATELET # BLD AUTO: 261 10(3)UL (ref 150–450)
PLATELET # BLD AUTO: 277 10(3)UL (ref 150–450)
POTASSIUM SERPL-SCNC: 4.5 MMOL/L (ref 3.5–5.1)
POTASSIUM SERPL-SCNC: 4.8 MMOL/L (ref 3.5–5.1)
PROT SERPL-MCNC: 7 G/DL (ref 5.7–8.2)
PROT SERPL-MCNC: 8.2 G/DL (ref 5.7–8.2)
RBC # BLD AUTO: 2.59 X10(6)UL
RBC # BLD AUTO: 2.89 X10(6)UL
RGTSCRN: 1
RH BLOOD TYPE: POSITIVE
SODIUM SERPL-SCNC: 137 MMOL/L (ref 136–145)
SODIUM SERPL-SCNC: 138 MMOL/L (ref 136–145)
T4 FREE SERPL-MCNC: 1.2 NG/DL (ref 0.8–1.7)
TSI SER-ACNC: 0.97 UIU/ML (ref 0.55–4.78)
WBC # BLD AUTO: 7.5 X10(3) UL (ref 4–11)
WBC # BLD AUTO: 7.9 X10(3) UL (ref 4–11)

## 2025-02-27 PROCEDURE — 86870 RBC ANTIBODY IDENTIFICATION: CPT | Performed by: EMERGENCY MEDICINE

## 2025-02-27 PROCEDURE — 86905 BLOOD TYPING RBC ANTIGENS: CPT | Performed by: EMERGENCY MEDICINE

## 2025-02-27 PROCEDURE — 80053 COMPREHEN METABOLIC PANEL: CPT

## 2025-02-27 PROCEDURE — 99285 EMERGENCY DEPT VISIT HI MDM: CPT

## 2025-02-27 PROCEDURE — 85025 COMPLETE CBC W/AUTO DIFF WBC: CPT | Performed by: EMERGENCY MEDICINE

## 2025-02-27 PROCEDURE — 86922 COMPATIBILITY TEST ANTIGLOB: CPT

## 2025-02-27 PROCEDURE — 93005 ELECTROCARDIOGRAM TRACING: CPT

## 2025-02-27 PROCEDURE — 80053 COMPREHEN METABOLIC PANEL: CPT | Performed by: EMERGENCY MEDICINE

## 2025-02-27 PROCEDURE — 86850 RBC ANTIBODY SCREEN: CPT | Performed by: EMERGENCY MEDICINE

## 2025-02-27 PROCEDURE — 72125 CT NECK SPINE W/O DYE: CPT | Performed by: HOSPITALIST

## 2025-02-27 PROCEDURE — 80053 COMPREHEN METABOLIC PANEL: CPT | Performed by: INTERNAL MEDICINE

## 2025-02-27 PROCEDURE — 84439 ASSAY OF FREE THYROXINE: CPT | Performed by: INTERNAL MEDICINE

## 2025-02-27 PROCEDURE — 86900 BLOOD TYPING SEROLOGIC ABO: CPT | Performed by: EMERGENCY MEDICINE

## 2025-02-27 PROCEDURE — 71045 X-RAY EXAM CHEST 1 VIEW: CPT | Performed by: EMERGENCY MEDICINE

## 2025-02-27 PROCEDURE — 36430 TRANSFUSION BLD/BLD COMPNT: CPT

## 2025-02-27 PROCEDURE — 86901 BLOOD TYPING SEROLOGIC RH(D): CPT | Performed by: EMERGENCY MEDICINE

## 2025-02-27 PROCEDURE — 86850 RBC ANTIBODY SCREEN: CPT

## 2025-02-27 PROCEDURE — 36415 COLL VENOUS BLD VENIPUNCTURE: CPT

## 2025-02-27 PROCEDURE — 85025 COMPLETE CBC W/AUTO DIFF WBC: CPT

## 2025-02-27 PROCEDURE — 85025 COMPLETE CBC W/AUTO DIFF WBC: CPT | Performed by: INTERNAL MEDICINE

## 2025-02-27 PROCEDURE — 86900 BLOOD TYPING SEROLOGIC ABO: CPT

## 2025-02-27 PROCEDURE — 36415 COLL VENOUS BLD VENIPUNCTURE: CPT | Performed by: INTERNAL MEDICINE

## 2025-02-27 PROCEDURE — 84443 ASSAY THYROID STIM HORMONE: CPT | Performed by: INTERNAL MEDICINE

## 2025-02-27 PROCEDURE — 93010 ELECTROCARDIOGRAM REPORT: CPT

## 2025-02-27 PROCEDURE — 82272 OCCULT BLD FECES 1-3 TESTS: CPT

## 2025-02-27 PROCEDURE — 86901 BLOOD TYPING SEROLOGIC RH(D): CPT

## 2025-02-27 RX ORDER — PREDNISOLONE ACETATE 10 MG/ML
1 SUSPENSION/ DROPS OPHTHALMIC DAILY
Status: DISCONTINUED | OUTPATIENT
Start: 2025-02-28 | End: 2025-03-01

## 2025-02-27 RX ORDER — DEXTROSE MONOHYDRATE AND SODIUM CHLORIDE 5; .45 G/100ML; G/100ML
INJECTION, SOLUTION INTRAVENOUS CONTINUOUS
Status: ACTIVE | OUTPATIENT
Start: 2025-02-27 | End: 2025-02-27

## 2025-02-27 RX ORDER — CLOPIDOGREL BISULFATE 75 MG/1
75 TABLET ORAL DAILY
Status: DISCONTINUED | OUTPATIENT
Start: 2025-02-28 | End: 2025-03-01

## 2025-02-27 RX ORDER — ATORVASTATIN CALCIUM 10 MG/1
10 TABLET, FILM COATED ORAL NIGHTLY
Status: DISCONTINUED | OUTPATIENT
Start: 2025-02-27 | End: 2025-03-01

## 2025-02-27 RX ORDER — ACETAMINOPHEN 500 MG
500 TABLET ORAL EVERY 4 HOURS PRN
Status: DISCONTINUED | OUTPATIENT
Start: 2025-02-27 | End: 2025-02-28

## 2025-02-27 RX ORDER — FLUTICASONE PROPIONATE AND SALMETEROL 250; 50 UG/1; UG/1
1 POWDER RESPIRATORY (INHALATION) 2 TIMES DAILY
Status: DISCONTINUED | OUTPATIENT
Start: 2025-02-27 | End: 2025-03-01

## 2025-02-27 RX ORDER — METOPROLOL SUCCINATE 100 MG/1
100 TABLET, EXTENDED RELEASE ORAL 2 TIMES DAILY
Status: DISCONTINUED | OUTPATIENT
Start: 2025-02-27 | End: 2025-03-01

## 2025-02-27 RX ORDER — METOPROLOL TARTRATE 50 MG
100 TABLET ORAL ONCE
Status: COMPLETED | OUTPATIENT
Start: 2025-02-27 | End: 2025-02-27

## 2025-02-27 RX ORDER — PANTOPRAZOLE SODIUM 40 MG/1
40 TABLET, DELAYED RELEASE ORAL
Status: DISCONTINUED | OUTPATIENT
Start: 2025-02-28 | End: 2025-02-28

## 2025-02-27 RX ORDER — FOLIC ACID 1 MG/1
2 TABLET ORAL DAILY
Status: DISCONTINUED | OUTPATIENT
Start: 2025-02-28 | End: 2025-03-01

## 2025-02-27 RX ORDER — ACETAMINOPHEN 500 MG
1000 TABLET ORAL 2 TIMES DAILY
COMMUNITY

## 2025-02-27 RX ORDER — CARBIDOPA AND LEVODOPA 25; 100 MG/1; MG/1
1 TABLET ORAL
Status: DISCONTINUED | OUTPATIENT
Start: 2025-02-27 | End: 2025-03-01

## 2025-02-27 RX ORDER — GABAPENTIN 300 MG/1
300 CAPSULE ORAL 2 TIMES DAILY
Status: DISCONTINUED | OUTPATIENT
Start: 2025-02-27 | End: 2025-03-01

## 2025-02-27 RX ORDER — GUAIFENESIN/DEXTROMETHORPHAN 100-10MG/5
10 SYRUP ORAL EVERY 4 HOURS PRN
COMMUNITY

## 2025-02-27 RX ORDER — ASPIRIN 81 MG/1
81 TABLET ORAL DAILY
Status: DISCONTINUED | OUTPATIENT
Start: 2025-02-28 | End: 2025-03-01

## 2025-02-27 NOTE — ED QUICK NOTES
Orders for admission, patient is aware of plan and ready to go upstairs. Any questions, please call ED RN Cait ESCUDERO at extension [phone].     Patient Covid vaccination status: Fully vaccinated     COVID Test Ordered in ED: None    COVID Suspicion at Admission: N/A    Running Infusions:  None    Mental Status/LOC at time of transport: A&Ox4    Other pertinent information:   CIWA score: N/A   NIH score:  N/A

## 2025-02-27 NOTE — ED PROVIDER NOTES
Patient Seen in: [facility] Emergency Department      History     Chief Complaint   Patient presents with    Abnormal Labs    Neck Pain     Stated Complaint: hgb 7, possible GI bleed, neck pain, + thyroid nodules    Subjective:   HPI      Patient had lab work performed earlier today and was noted to have a hemoglobin of 7.  The patient was then sent in by her physician to be evaluated for her acute anemia.  2 months ago, the patient's hemoglobin was 10.  Patient has not noted any change in bowel movements.  She has had no nausea vomiting or any other obvious source of bleeding.  The patient has had no dyspnea, but does feel somewhat generally weak.  She has previous history of cardiac issues, but she has no chest pain.  She has had no palpitations, syncope or near syncope.  She is on Plavix primarily because of her peripheral vascular disease.  She has no extremity discomfort or edema or weakness.    Objective:     Past Medical History:    Arrhythmia    C2 cervical fracture (HCC)    C5 vertebral fracture (HCC)    CVA (cerebral infarction)    x2    GERD (gastroesophageal reflux disease)    HTN (hypertension)    SHANA (obstructive sleep apnea)    Osteoarthritis    Overactive bladder    Parkinson's disease (HCC)    Pulmonary hypertension (HCC)    RA (rheumatoid arthritis) (HCC)    Type II or unspecified type diabetes mellitus without mention of complication, not stated as uncontrolled    Unspecified sleep apnea    AHI 78 RDI 80 SaO2 deirdre 85 % CPAP 11  Sleep RX/ now HME    Vitamin D deficiency    Vitamin D deficiency              Past Surgical History:   Procedure Laterality Date    Cholecystectomy      Hernia surgery      Hip replacement surgery Bilateral     Knee replacement surgery Right     Other surgical history      \"Veins removed\"    Removal of heel spur Right     Tonsillectomy                  Social History     Socioeconomic History    Marital status:    Tobacco Use    Smoking status: Never     Smokeless tobacco: Never   Vaping Use    Vaping status: Never Used   Substance and Sexual Activity    Alcohol use: No     Alcohol/week: 0.0 standard drinks of alcohol    Drug use: No   Social History Narrative     2014    5 kids    No tobacco, EtOH, drugs    Retired      Social Drivers of Health     Food Insecurity: No Food Insecurity (2/27/2025)    NCSS - Food Insecurity     Worried About Running Out of Food in the Last Year: No     Ran Out of Food in the Last Year: No   Transportation Needs: No Transportation Needs (2/27/2025)    NCSS - Transportation     Lack of Transportation: No   Housing Stability: Not At Risk (2/27/2025)    NCSS - Housing/Utilities     Has Housing: Yes     Worried About Losing Housing: No     Unable to Get Utilities: No                  Physical Exam     ED Triage Vitals [02/27/25 1252]   BP (!) 172/91   Pulse 88   Resp 18   Temp 98.1 °F (36.7 °C)   Temp src Temporal   SpO2 97 %   O2 Device None (Room air)       Current Vitals:   Vital Signs  BP: (!) 177/67  Pulse: 67  Resp: 22  Temp: 98.2 °F (36.8 °C)  Temp src: Oral  MAP (mmHg): 96    Oxygen Therapy  SpO2: 96 %  O2 Device: None (Room air)  Pulse Oximetry Type: Continuous        Physical Exam  Vitals and nursing note reviewed. Exam conducted with a chaperone present.   Constitutional:       General: She is not in acute distress.     Appearance: Normal appearance. She is well-developed.   HENT:      Head: Normocephalic.   Cardiovascular:      Rate and Rhythm: Normal rate and regular rhythm.      Heart sounds: Normal heart sounds. No murmur heard.  Pulmonary:      Effort: Pulmonary effort is normal. No respiratory distress.      Breath sounds: Normal breath sounds.   Abdominal:      General: Bowel sounds are normal.      Palpations: Abdomen is soft.      Tenderness: There is no abdominal tenderness. There is no rebound.   Genitourinary:     Rectum: Guaiac result negative.   Musculoskeletal:         General: No tenderness.  Normal range of motion.      Cervical back: Normal range of motion and neck supple.   Lymphadenopathy:      Cervical: No cervical adenopathy.   Skin:     General: Skin is warm and dry.      Findings: No rash.   Neurological:      Mental Status: She is alert and oriented to person, place, and time.      Sensory: No sensory deficit.            ED Course     Labs Reviewed   CBC WITH DIFFERENTIAL WITH PLATELET - Abnormal; Notable for the following components:       Result Value    RBC 2.89 (*)     HGB 7.5 (*)     HCT 25.2 (*)     MCHC 29.8 (*)     Monocyte Absolute 1.12 (*)     All other components within normal limits   COMP METABOLIC PANEL (14) - Abnormal; Notable for the following components:    Glucose 102 (*)     BUN 31 (*)     Creatinine 1.23 (*)     eGFR-Cr 43 (*)     ALT <7 (*)     All other components within normal limits   ANTIBODY IDENTIFICATION    Narrative:     ANTI-MARGOT    CHARACTERISTICS       Anti-Lakota is a very common antibody due, in part, to the high immunogenicity of the Margot antigen ( times more than other blood group antigens). Most examples of anti-Lakota are IgG, immune antibodies which react at 37 degrees C (antiglobulin phase). Some examples of non-red-cell-immune (cold reacting) Margot antibody production have been reported and are thought to be related to certain bacterial and viral infections which may be the source of Margot-like antigens. Some examples of Margot antibodies bind complement and can cause intravascular lysis. Lakota antibodies are capable of causing severe hemolytic disease of the  and hemolytic transfusion reactions.    TRANSFUSION CONSIDERATIONS       Patients with anti-Margot must receive blood which lacks the Lakota antigen. Units lacking Lakota are easy to locate since 91% of the population lacks this antigen.     TYPE AND SCREEN    Narrative:     The following orders were created for panel order Type and screen.  Procedure                               Abnormality          Status                     ---------                               -----------         ------                     ABORH (Blood Type)[[id number]]                               Final result               Antibody Screen[[id number]]                                  Final result                 Please view results for these tests on the individual orders.   ABORH (BLOOD TYPE)   ANTIBODY SCREEN   PREPARE RBC   RGTSCRN   RAINBOW DRAW LAVENDER   RAINBOW DRAW LIGHT GREEN   RAINBOW DRAW BLUE     EKG    Rate, intervals and axes as noted on EKG Report.  Rate: 71  Rhythm: Sinus Rhythm  Reading: Normal           ED Course as of 02/27/25 2154  ------------------------------------------------------------  Time: 02/27 1745  Value: Hemoglobin(!): 7.5  Comment: (Reviewed)  ------------------------------------------------------------  Time: 02/27 1807  Comment: Spoke with hospitalist and given the patient's other comorbidities, it was decided to proceed with transfusion.  Additionally, I consulted gastroenterology who will follow the patient as an inpatient.  Patient is currently hemodynamically stable.  ------------------------------------------------------------  Time: 02/27 1808  Value: Comp Metabolic Panel (14)(!)  Comment: Elevated BUN and creatinine, consistent with patient's baseline.              MDM      Patient comes to the emergency department with symptoms of weakness and blood draw earlier today which revealed a hemoglobin of 7.  This is a significant drop from her baseline 2 months ago.  Patient had no clinical subjective symptoms suggesting gastrointestinal hemorrhage.  Rectal exam was performed which was Hemoccult negative.  No other obvious sources of bleeding were noted.  Patient was in no hemodynamic distress or instability while in the emergency department.  I did consult with hospitalist and because of the patient's other multiple comorbidities, it was decided to proceed with transfusion.  Patient was hospitalized  for further transfusion, monitoring and workup of her acute anemia.    Admission disposition: 2/27/2025  5:57 PM           Medical Decision Making      Disposition and Plan     Clinical Impression:  1. Anemia, unspecified type         Disposition:  Admit  2/27/2025  5:57 pm    Follow-up:  No follow-up provider specified.        Medications Prescribed:  Current Discharge Medication List              Supplementary Documentation:         Hospital Problems       Present on Admission  Date Reviewed: 12/28/2021            ICD-10-CM Noted POA    * (Principal) Anemia, unspecified type D64.9 2/27/2025 Unknown

## 2025-02-27 NOTE — ED INITIAL ASSESSMENT (HPI)
Patient arrived via EMS sent from [facility] and sent for abnormal labs low hgb 7 drawn today. Patient c/o neck pain that's chronic- patient sts she's always falling.

## 2025-02-28 ENCOUNTER — APPOINTMENT (OUTPATIENT)
Dept: GENERAL RADIOLOGY | Facility: HOSPITAL | Age: 85
End: 2025-02-28
Attending: HOSPITALIST
Payer: MEDICARE

## 2025-02-28 ENCOUNTER — ANESTHESIA (OUTPATIENT)
Dept: ENDOSCOPY | Facility: HOSPITAL | Age: 85
End: 2025-02-28
Payer: MEDICARE

## 2025-02-28 ENCOUNTER — ANESTHESIA EVENT (OUTPATIENT)
Dept: ENDOSCOPY | Facility: HOSPITAL | Age: 85
End: 2025-02-28
Payer: MEDICARE

## 2025-02-28 LAB
ALBUMIN SERPL-MCNC: 4.1 G/DL (ref 3.2–4.8)
ALBUMIN/GLOB SERPL: 1.4 {RATIO} (ref 1–2)
ALP LIVER SERPL-CCNC: 66 U/L
ALT SERPL-CCNC: <7 U/L
ANION GAP SERPL CALC-SCNC: 7 MMOL/L (ref 0–18)
AST SERPL-CCNC: 15 U/L (ref ?–34)
ATRIAL RATE: 71 BPM
BILIRUB SERPL-MCNC: 0.4 MG/DL (ref 0.2–1.1)
BUN BLD-MCNC: 25 MG/DL (ref 9–23)
CALCIUM BLD-MCNC: 10 MG/DL (ref 8.7–10.6)
CHLORIDE SERPL-SCNC: 104 MMOL/L (ref 98–112)
CO2 SERPL-SCNC: 27 MMOL/L (ref 21–32)
CREAT BLD-MCNC: 0.93 MG/DL
EGFRCR SERPLBLD CKD-EPI 2021: 60 ML/MIN/1.73M2 (ref 60–?)
ERYTHROCYTE [DISTWIDTH] IN BLOOD BY AUTOMATED COUNT: 15.4 %
FOLATE SERPL-MCNC: >48 NG/ML (ref 5.4–?)
GLOBULIN PLAS-MCNC: 3 G/DL (ref 2–3.5)
GLUCOSE BLD-MCNC: 100 MG/DL (ref 70–99)
GLUCOSE BLD-MCNC: 136 MG/DL (ref 70–99)
GLUCOSE BLD-MCNC: 140 MG/DL (ref 70–99)
GLUCOSE BLD-MCNC: 140 MG/DL (ref 70–99)
GLUCOSE BLD-MCNC: 175 MG/DL (ref 70–99)
HAPTOGLOB SERPL-MCNC: 206 MG/DL (ref 30–200)
HCT VFR BLD AUTO: 26.5 %
HGB BLD-MCNC: 8.3 G/DL
IRON SATN MFR SERPL: 6 %
IRON SERPL-MCNC: 21 UG/DL
LDH SERPL L TO P-CCNC: 154 U/L
MAGNESIUM SERPL-MCNC: 1.3 MG/DL (ref 1.6–2.6)
MCH RBC QN AUTO: 26.9 PG (ref 26–34)
MCHC RBC AUTO-ENTMCNC: 31.3 G/DL (ref 31–37)
MCV RBC AUTO: 86 FL
OSMOLALITY SERPL CALC.SUM OF ELEC: 290 MOSM/KG (ref 275–295)
P AXIS: 78 DEGREES
P-R INTERVAL: 200 MS
PLATELET # BLD AUTO: 226 10(3)UL (ref 150–450)
POTASSIUM SERPL-SCNC: 4.2 MMOL/L (ref 3.5–5.1)
PROT SERPL-MCNC: 7.1 G/DL (ref 5.7–8.2)
Q-T INTERVAL: 384 MS
QRS DURATION: 80 MS
QTC CALCULATION (BEZET): 417 MS
R AXIS: 4 DEGREES
RBC # BLD AUTO: 3.08 X10(6)UL
SODIUM SERPL-SCNC: 138 MMOL/L (ref 136–145)
T AXIS: 36 DEGREES
TOTAL IRON BINDING CAPACITY: 359 UG/DL (ref 250–425)
TRANSFERRIN SERPL-MCNC: 279 MG/DL (ref 250–380)
VENTRICULAR RATE: 71 BPM
VIT B12 SERPL-MCNC: 485 PG/ML (ref 211–911)
WBC # BLD AUTO: 6.6 X10(3) UL (ref 4–11)

## 2025-02-28 PROCEDURE — 83615 LACTATE (LD) (LDH) ENZYME: CPT | Performed by: HOSPITALIST

## 2025-02-28 PROCEDURE — 83010 ASSAY OF HAPTOGLOBIN QUANT: CPT | Performed by: HOSPITALIST

## 2025-02-28 PROCEDURE — 0DJ08ZZ INSPECTION OF UPPER INTESTINAL TRACT, VIA NATURAL OR ARTIFICIAL OPENING ENDOSCOPIC: ICD-10-PCS | Performed by: INTERNAL MEDICINE

## 2025-02-28 PROCEDURE — 80053 COMPREHEN METABOLIC PANEL: CPT | Performed by: HOSPITALIST

## 2025-02-28 PROCEDURE — 82962 GLUCOSE BLOOD TEST: CPT

## 2025-02-28 PROCEDURE — 82746 ASSAY OF FOLIC ACID SERUM: CPT | Performed by: HOSPITALIST

## 2025-02-28 PROCEDURE — 83550 IRON BINDING TEST: CPT | Performed by: HOSPITALIST

## 2025-02-28 PROCEDURE — 82607 VITAMIN B-12: CPT | Performed by: HOSPITALIST

## 2025-02-28 PROCEDURE — 83540 ASSAY OF IRON: CPT | Performed by: HOSPITALIST

## 2025-02-28 PROCEDURE — 71045 X-RAY EXAM CHEST 1 VIEW: CPT | Performed by: HOSPITALIST

## 2025-02-28 PROCEDURE — 83735 ASSAY OF MAGNESIUM: CPT | Performed by: HOSPITALIST

## 2025-02-28 PROCEDURE — 85027 COMPLETE CBC AUTOMATED: CPT | Performed by: HOSPITALIST

## 2025-02-28 PROCEDURE — 96365 THER/PROPH/DIAG IV INF INIT: CPT

## 2025-02-28 PROCEDURE — 94640 AIRWAY INHALATION TREATMENT: CPT

## 2025-02-28 RX ORDER — HYDROMORPHONE HYDROCHLORIDE 1 MG/ML
0.6 INJECTION, SOLUTION INTRAMUSCULAR; INTRAVENOUS; SUBCUTANEOUS EVERY 5 MIN PRN
Status: DISCONTINUED | OUTPATIENT
Start: 2025-02-28 | End: 2025-02-28 | Stop reason: HOSPADM

## 2025-02-28 RX ORDER — NALOXONE HYDROCHLORIDE 0.4 MG/ML
0.08 INJECTION, SOLUTION INTRAMUSCULAR; INTRAVENOUS; SUBCUTANEOUS AS NEEDED
Status: DISCONTINUED | OUTPATIENT
Start: 2025-02-28 | End: 2025-02-28 | Stop reason: HOSPADM

## 2025-02-28 RX ORDER — NICOTINE POLACRILEX 4 MG
30 LOZENGE BUCCAL
Status: DISCONTINUED | OUTPATIENT
Start: 2025-02-28 | End: 2025-03-01

## 2025-02-28 RX ORDER — DEXTROSE MONOHYDRATE 25 G/50ML
50 INJECTION, SOLUTION INTRAVENOUS
Status: DISCONTINUED | OUTPATIENT
Start: 2025-02-28 | End: 2025-03-01

## 2025-02-28 RX ORDER — METOCLOPRAMIDE HYDROCHLORIDE 5 MG/ML
5 INJECTION INTRAMUSCULAR; INTRAVENOUS EVERY 8 HOURS PRN
Status: DISCONTINUED | OUTPATIENT
Start: 2025-02-28 | End: 2025-02-28 | Stop reason: HOSPADM

## 2025-02-28 RX ORDER — ONDANSETRON 2 MG/ML
4 INJECTION INTRAMUSCULAR; INTRAVENOUS EVERY 6 HOURS PRN
Status: DISCONTINUED | OUTPATIENT
Start: 2025-02-28 | End: 2025-02-28 | Stop reason: HOSPADM

## 2025-02-28 RX ORDER — SODIUM CHLORIDE, SODIUM LACTATE, POTASSIUM CHLORIDE, CALCIUM CHLORIDE 600; 310; 30; 20 MG/100ML; MG/100ML; MG/100ML; MG/100ML
INJECTION, SOLUTION INTRAVENOUS CONTINUOUS
Status: DISCONTINUED | OUTPATIENT
Start: 2025-02-28 | End: 2025-03-01

## 2025-02-28 RX ORDER — MAGNESIUM SULFATE HEPTAHYDRATE 40 MG/ML
2 INJECTION, SOLUTION INTRAVENOUS ONCE
Status: COMPLETED | OUTPATIENT
Start: 2025-02-28 | End: 2025-02-28

## 2025-02-28 RX ORDER — LIDOCAINE HYDROCHLORIDE 10 MG/ML
INJECTION, SOLUTION EPIDURAL; INFILTRATION; INTRACAUDAL; PERINEURAL AS NEEDED
Status: DISCONTINUED | OUTPATIENT
Start: 2025-02-28 | End: 2025-02-28 | Stop reason: SURG

## 2025-02-28 RX ORDER — HYDROMORPHONE HYDROCHLORIDE 1 MG/ML
0.2 INJECTION, SOLUTION INTRAMUSCULAR; INTRAVENOUS; SUBCUTANEOUS EVERY 5 MIN PRN
Status: DISCONTINUED | OUTPATIENT
Start: 2025-02-28 | End: 2025-02-28 | Stop reason: HOSPADM

## 2025-02-28 RX ORDER — ACETAMINOPHEN 500 MG
500 TABLET ORAL EVERY 4 HOURS PRN
Status: DISCONTINUED | OUTPATIENT
Start: 2025-02-28 | End: 2025-03-01

## 2025-02-28 RX ORDER — SODIUM CHLORIDE, SODIUM LACTATE, POTASSIUM CHLORIDE, CALCIUM CHLORIDE 600; 310; 30; 20 MG/100ML; MG/100ML; MG/100ML; MG/100ML
INJECTION, SOLUTION INTRAVENOUS CONTINUOUS PRN
Status: DISCONTINUED | OUTPATIENT
Start: 2025-02-28 | End: 2025-02-28 | Stop reason: SURG

## 2025-02-28 RX ORDER — NICOTINE POLACRILEX 4 MG
15 LOZENGE BUCCAL
Status: DISCONTINUED | OUTPATIENT
Start: 2025-02-28 | End: 2025-03-01

## 2025-02-28 RX ORDER — FAMOTIDINE 20 MG/1
20 TABLET, FILM COATED ORAL DAILY
Status: DISCONTINUED | OUTPATIENT
Start: 2025-02-28 | End: 2025-03-01

## 2025-02-28 RX ORDER — IPRATROPIUM BROMIDE AND ALBUTEROL SULFATE 2.5; .5 MG/3ML; MG/3ML
3 SOLUTION RESPIRATORY (INHALATION) EVERY 6 HOURS PRN
Status: DISCONTINUED | OUTPATIENT
Start: 2025-02-28 | End: 2025-03-01

## 2025-02-28 RX ORDER — HYDROMORPHONE HYDROCHLORIDE 1 MG/ML
0.4 INJECTION, SOLUTION INTRAMUSCULAR; INTRAVENOUS; SUBCUTANEOUS EVERY 5 MIN PRN
Status: DISCONTINUED | OUTPATIENT
Start: 2025-02-28 | End: 2025-02-28 | Stop reason: HOSPADM

## 2025-02-28 RX ADMIN — SODIUM CHLORIDE, SODIUM LACTATE, POTASSIUM CHLORIDE, CALCIUM CHLORIDE: 600; 310; 30; 20 INJECTION, SOLUTION INTRAVENOUS at 12:52:00

## 2025-02-28 RX ADMIN — LIDOCAINE HYDROCHLORIDE 25 MG: 10 INJECTION, SOLUTION EPIDURAL; INFILTRATION; INTRACAUDAL; PERINEURAL at 12:54:00

## 2025-02-28 NOTE — CONSULTS
[facility]  Division of Cardiology  Consultation Note    Sandi Linder Patient Status:  IN HOSPITAL    1940 MRN [id number]   Location [facility] Attending Remington Hayes,    Hosp Day # 0 PCP Norm Mendoza MD     Impression:  Anemia  Acute on chronic  GI eval negative for active bleed 25  PAF  S/P Watchman 2024  Currently on DAPT (held in house for anemia)  Evidence of endothelialization at 45 day CTA  HTN  Dyslipidemia  DM  Hx falls  PAD  Parkinson's    Plan:  Anemia/GIB: Watchman in place and now >5 months old with good FU CTA at 45 days.  1. Ok to hold both ASA and Plavix IF NEEDED given anemia.  2. Can transition her to mono-therapy at this point.  Either ASA or Plavix would be sufficient.  If Plavix superior given lack of ulcer formation we can use it.  Otherwise ASA 81mg would be standard at approximately this point.  In conclusion, if GI is ok with resumption of antiplatelet meds, we can switch to mono-therapy and chose which ever drug (ASA or plavix) is deemed \"safer\" by GI.  PAF: NSR currently.  Rate control and watchman as above.  HTN: BP acceptable on current meds.  Lipids: Statin. Goal LDL<70.  DM: Per primary team.         Chief Complaint: Anemia (lab abnormalities)    History of Present Illness: Sandi Linder is a(n) 84 year old female with hx falls and PAF who underwent Watchman LAAO procedure back in September.  She's been on DAPT since then.  She had labs done as an outpatient and they demonstrated anemia and she was sent to the ER and subsequently admitted.    She notes increasing fatigue over the last few weeks.  \"No energy\".  She notes episodic dizziness.  No falls or syncope, however.   No clear GIB (no melena, no hematochezia).    Had EGD today.  Unrevealing.  Has had colonoscopy recently.        History:  Past Medical History:    Arrhythmia    C2 cervical fracture (HCC)    C5 vertebral fracture (HCC)    CVA (cerebral  infarction)    x2    GERD (gastroesophageal reflux disease)    HTN (hypertension)    SHANA (obstructive sleep apnea)    Osteoarthritis    Overactive bladder    Parkinson's disease (HCC)    Pulmonary hypertension (HCC)    RA (rheumatoid arthritis) (HCC)    Type II or unspecified type diabetes mellitus without mention of complication, not stated as uncontrolled    Unspecified sleep apnea    AHI 78 RDI 80 SaO2 deirdre 85 % CPAP 11  Sleep RX/ now HME    Vitamin D deficiency    Vitamin D deficiency     Past Surgical History:   Procedure Laterality Date    Cholecystectomy      Hernia surgery      Hip replacement surgery Bilateral     Knee replacement surgery Right     Other surgical history      \"Veins removed\"    Removal of heel spur Right     Tonsillectomy       Social History     Socioeconomic History    Marital status:    Tobacco Use    Smoking status: Never    Smokeless tobacco: Never   Vaping Use    Vaping status: Never Used   Substance and Sexual Activity    Alcohol use: No     Alcohol/week: 0.0 standard drinks of alcohol    Drug use: No     Family History   Problem Relation Age of Onset    Other (Other) Mother         RA    Other (Other) Sister         RA    Other (mitral valve prolapse) Sister     Other (Psoriasis) Brother         Medications:    insulin aspart  1-5 Units Subcutaneous TID AC and HS    famotidine  20 mg Oral Daily    aspirin  81 mg Oral Daily    carbidopa-levodopa  1 tablet Oral 3 times per day    clopidogrel  75 mg Oral Daily    fluticasone-salmeterol  1 puff Inhalation BID    folic acid  2 mg Oral Daily    gabapentin  300 mg Oral BID    metoprolol succinate ER  100 mg Oral BID    prednisoLONE  1 drop Left Eye Daily    sertraline  50 mg Oral Daily    atorvastatin  10 mg Oral Nightly       Continuous Infusion:    lactated ringers         PRN Medications:     glucose **OR** glucose **OR** glucose-vitamin C **OR** dextrose **OR** glucose **OR** glucose **OR** glucose-vitamin C    acetaminophen     lactated ringers    atropine    naloxone    fentaNYL **OR** fentaNYL    HYDROmorphone **OR** HYDROmorphone **OR** HYDROmorphone    ondansetron    metoclopramide    melatonin    Outpatient Medications:   Medications Ordered Prior to Encounter[1]    Home Medications:  Medications Taking[2]     insulin aspart  1-5 Units Subcutaneous TID AC and HS    famotidine  20 mg Oral Daily    aspirin  81 mg Oral Daily    carbidopa-levodopa  1 tablet Oral 3 times per day    clopidogrel  75 mg Oral Daily    fluticasone-salmeterol  1 puff Inhalation BID    folic acid  2 mg Oral Daily    gabapentin  300 mg Oral BID    metoprolol succinate ER  100 mg Oral BID    prednisoLONE  1 drop Left Eye Daily    sertraline  50 mg Oral Daily    atorvastatin  10 mg Oral Nightly       Allergies:   Allergies[3]     Review of Systems:   Ten point review of systems is unremarkable except as mentioned above.     Physical Exam:   /73   Pulse 73   Temp 97.3 °F (36.3 °C)   Resp 20   Ht 62\"   Wt 176 lb 6.4 oz (80 kg)   SpO2 93%   BMI 32.26 kg/m²   Wt Readings from Last 3 Encounters:   02/28/25 176 lb 6.4 oz (80 kg)   09/14/24 182 lb 5.1 oz (82.7 kg)   07/22/24 179 lb (81.2 kg)       General: Alert, oriented, and in no apparent distress.  HEENT: No focal deficits.  Neck: No JVD, carotids 2+ no bruits.  Cardiac: Regular rate and rhythm; no murmur, rub or gallop.  Lungs: Clear without wheezes, rales, rhonchi.    Abdomen: Soft, non-tender, + bowel sounds.   Extremities: No clubbing, cyanosis or edema.  Palpable peripheral pulses.  Neurologic: Alert and oriented, normal affect.  Skin: Warm and dry.      Labs:   Recent Labs   Lab 02/27/25  0535 02/27/25  1255 02/28/25  0540   WBC 7.5 7.9 6.6   HGB 7.0* 7.5* 8.3*   MCV 87.6 87.2 86.0   .0 277.0 226.0     Recent Labs   Lab 02/27/25  0535 02/27/25  1255 02/28/25  0540    137 138   K 4.8 4.5 4.2    100 104   CO2 30.0 30.0 27.0   BUN 31* 31* 25*   CREATSERUM 1.15* 1.23* 0.93   *  102* 100*   CA 10.2 10.3 10.0   MG  --   --  1.3*     Recent Labs   Lab 02/27/25  0535 02/27/25  1255 02/28/25  0540   ALT <7* <7* <7*   AST 14 15 15   ALB 4.1 4.7 4.1   LDH  --   --  154     Lab Results   Component Value Date    A1C 6.8 (H) 12/03/2024    A1C 7.1 (H) 08/27/2024    A1C 6.8 (H) 05/21/2024     Lab Results   Component Value Date    CHOLEST 94 04/20/2023    HDL 41 04/20/2023    LDL 30 04/20/2023    TRIG 134 04/20/2023        Studies:   EKG: Sinus.  Normal.  Tele: No sig arrhythmia.  Sinus.  Echo (6/2024):   1. Left ventricle: The cavity size was normal. Wall thickness was normal.      Systolic function was normal. The estimated ejection fraction was 55-60%.      No diagnostic evidence for regional wall motion abnormalities. Unable to      assess LV diastolic function due to heart rhythm.   2. Left atrium: The left atrial volume was mildly increased.   3. Right atrium: The atrium was mildly dilated.   4. Mitral valve: There was mild regurgitation.   5. Pulmonary arteries: The peak systolic pressure is 39mm Hg.   CTA:  CONCLUSIONS:   1. A Watchman FLX Pro device is noted in the left atrial appendage. The   device appears adequately seated, with appropriate depth, and without an   excessive shoulder. There is subfabric HAT (hypoattenuated thickening)   which is associated with endothelialization. There is no evidence of   device related thrombus on the left atrial surface of the device. A small   (1.8 mm in length) peridevice leak is present along the posterior surface   of the device. Contrast is noted in the left atrial appendage, which can   be a normal finding at 45-days post-implant.   2. Please refer to the radiologist's interpretation of non-cardiac   Thank you for allowing [facility] to care for your patient. Please contact me with any questions!     Gene Luther MD  General, Interventional  Structural & Endovascular  Cardiology           [1]   No current facility-administered medications on file prior  to encounter.     Current Outpatient Medications on File Prior to Encounter   Medication Sig Dispense Refill    guaiFENesin-dextromethorphan 100-10 MG/5ML Oral Syrup Take 10 mL by mouth every 4 (four) hours as needed for cough.      acetaminophen 500 MG Oral Tab Take 2 tablets (1,000 mg total) by mouth in the morning and 2 tablets (1,000 mg total) before bedtime.      metFORMIN HCl 1000 MG Oral Tab Take 1 tablet (1,000 mg total) by mouth 2 (two) times daily with meals.      polypropylene glycol- 0.4-0.3 % Ophthalmic Solution Place 1 drop into both eyes in the morning, at noon, in the evening, and at bedtime.      gabapentin 300 MG Oral Cap Take 1 capsule (300 mg total) by mouth 2 (two) times daily.      carbidopa-levodopa  MG Oral Tab Take 1 tablet by mouth 3 (three) times daily. At 6am, 2pm, 7pm      sacubitril-valsartan 24-26 MG Oral Tab Take 1 tablet by mouth 2 (two) times daily.      furosemide 40 MG Oral Tab Take 1 tablet (40 mg total) by mouth daily.      spironolactone 25 MG Oral Tab Take 1 tablet (25 mg total) by mouth daily.      prednisoLONE 1 % Ophthalmic Suspension Place 1 drop into the left eye daily.      clopidogrel 75 MG Oral Tab Take 1 tablet (75 mg total) by mouth daily. 30 tablet 0    fluticasone-salmeterol 115-21 MCG/ACT Inhalation Aerosol Inhale 2 puffs into the lungs 2 (two) times daily.      pantoprazole 40 MG Oral Tab EC Take 1 tablet (40 mg total) by mouth every morning before breakfast.      acetaminophen 500 MG Oral Tab Take 1 tablet (500 mg total) by mouth in the morning and 1 tablet (500 mg total) before bedtime.      cyanocobalamin 1000 MCG/ML Injection Solution Inject 1 mL (1,000 mcg total) into the muscle every 30 (thirty) days. On the 1st of every month      folic acid 1 MG Oral Tab Take 2 tablets (2 mg total) by mouth daily. 1400      fluticasone propionate 50 MCG/ACT Nasal Suspension 2 sprays by Each Nare route daily.      ERGOCALCIFEROL 1.25 MG (68331 UT) Oral Cap  TAKE 1 CAPSULE BY MOUTH EVERY 14 DAYS 6 capsule 1    METOPROLOL SUCCINATE 100 MG Oral Tablet 24 Hr TAKE 1 TABLET(100 MG) BY MOUTH TWICE DAILY 180 tablet 0    Sertraline HCl 50 MG Oral Tab Take 1 tablet (50 mg total) by mouth daily. 30 tablet 3    SIMVASTATIN 20 MG Oral Tab TAKE 1 TABLET BY MOUTH EVERY DAY 90 tablet 3    ASPIRIN LOW DOSE 81 MG Oral Tab EC TAKE 1 TABLET BY MOUTH TWICE DAILY 180 tablet 3    Insulin Pen Needle (BD PEN NEEDLE MINI U/F) 31G X 5 MM Does not apply Misc Use one needle daily to administer forteo injections 90 each 3    Glucose Blood (ACCU-CHEK SMARTVIEW) In Vitro Strip USE TO TEST THREE TIMES DAILY OR AS DIRECTED 300 strip 2    ACCU-CHEK FASTCLIX LANCETS Does not apply Misc TEST THREE TIMES DAILY OR AS DIRECTED 306 each 1   [2]   Outpatient Medications Marked as Taking for the 2/27/25 encounter (Hospital Encounter)   Medication Sig Dispense Refill    guaiFENesin-dextromethorphan 100-10 MG/5ML Oral Syrup Take 10 mL by mouth every 4 (four) hours as needed for cough.     [3]   Allergies  Allergen Reactions    Naproxen OTHER (SEE COMMENTS)     headaches    Adhesive Tape RASH     States makes skin \"raw\"    Chocolate RASH    Strawberries RASH

## 2025-02-28 NOTE — CONSULTS
[facility]    Sandi Linder Patient Status:  Observation    1940 MRN [id number]   Location [facility]A Attending Remington Hayes,    Hosp Day # 0 PCP Norm Mendoza MD     Sandi Linder is a 84 year old female for anemia consult.  Noted on recent evaluation.  Patient denies rectal bleeding, abd pain, diarrhea, constipation, melena. Hx Watchman.  Colon several years ago.  Lab Results   Component Value Date    WBC 6.6 2025    HGB 8.3 2025    HCT 26.5 2025    .0 2025    CREATSERUM 0.93 2025    BUN 25 2025     2025    K 4.2 2025     2025    CO2 27.0 2025     2025    CA 10.0 2025    ALB 4.1 2025    ALKPHO 66 2025    BILT 0.4 2025    TP 7.1 2025    AST 15 2025    ALT <7 2025    MG 1.3 2025             Chief Complaint   Patient presents with    Abnormal Labs    Neck Pain        Allergies: Allergies[1]   No current outpatient medications on file.      Past Medical History:    Arrhythmia    C2 cervical fracture (HCC)    C5 vertebral fracture (HCC)    CVA (cerebral infarction)    x2    GERD (gastroesophageal reflux disease)    HTN (hypertension)    SHANA (obstructive sleep apnea)    Osteoarthritis    Overactive bladder    Parkinson's disease (HCC)    Pulmonary hypertension (HCC)    RA (rheumatoid arthritis) (HCC)    Type II or unspecified type diabetes mellitus without mention of complication, not stated as uncontrolled    Unspecified sleep apnea    AHI 78 RDI 80 SaO2 deirdre 85 % CPAP 11  Sleep RX/ now HME    Vitamin D deficiency    Vitamin D deficiency      Past Surgical History:   Procedure Laterality Date    Cholecystectomy      Hernia surgery      Hip replacement surgery Bilateral     Knee replacement surgery Right     Other surgical history      \"Veins removed\"    Removal of heel spur Right     Tonsillectomy        Social History     Socioeconomic History     Marital status:    Tobacco Use    Smoking status: Never    Smokeless tobacco: Never   Vaping Use    Vaping status: Never Used   Substance and Sexual Activity    Alcohol use: No     Alcohol/week: 0.0 standard drinks of alcohol    Drug use: No   Social History Narrative     2014    5 kids    No tobacco, EtOH, drugs    Retired      Social Drivers of Health     Food Insecurity: No Food Insecurity (2/27/2025)    NCSS - Food Insecurity     Worried About Running Out of Food in the Last Year: No     Ran Out of Food in the Last Year: No   Transportation Needs: No Transportation Needs (2/27/2025)    NCSS - Transportation     Lack of Transportation: No   Housing Stability: Not At Risk (2/27/2025)    NCSS - Housing/Utilities     Has Housing: Yes     Worried About Losing Housing: No     Unable to Get Utilities: No     Occupation:    FAMILY HX: GI HX: None, no hx of colon cancer  Family History   Problem Relation Age of Onset    Other (Other) Mother         RA    Other (Other) Sister         RA    Other (mitral valve prolapse) Sister     Other (Psoriasis) Brother         REVIEW OF SYSTEMS:   GEN: feels well otherwise, no F/C, no wt loss  SKIN: no skin lesions  HEENT: no jaundice   LUNGS: no SOB   CV: no chest pain GI: no dysphagia, N/V  : no hematuria    MSK: no joint pain    ENDOCR: no diabetes or thyroid history    EXAM:   /57 (BP Location: Left arm)   Pulse 72   Temp 98.1 °F (36.7 °C) (Oral)   Resp 20   Ht 5' 2\" (1.575 m)   Wt 176 lb 6.4 oz (80 kg)   SpO2 96%   BMI 32.26 kg/m²  - Body mass index is 32.26 kg/m².  GEN: well developed, well nourished, NAD  SKIN: no rashes  NECK: no JVD  HEENT: non-icteric   LUNGS: CTA  CARDIO: RRR  GI: good BS's,no masses, HSM or tenderness RECTAL: Exam not done. EXTREM.: no edema NEURO: A + O    ASSESSMENT AND PLAN:   Sandi Linder is a 84 year old female with anemia.  Patient is otherwise asymptomatic and at average risk for colon cancer.  No active  bleeding. Hx Watchman. Had colon few years ago.  Need to rule out UGI source AVM's, PUD, gastritis or other with endoscopic evaluation.  Needs  EGD, procedure explained, risks of bleeding, perforation discussed.    1. EGD today    Total time spent on patient care:  35 minutes    cc: Dr. Duarte ref. provider found    The patient indicates understanding of these issues and agrees to the plan.    Pantera Sexton MD  2/28/2025  7:35 AM         [1]   Allergies  Allergen Reactions    Naproxen OTHER (SEE COMMENTS)     headaches    Adhesive Tape RASH     States makes skin \"raw\"    Chocolate RASH    Strawberries RASH

## 2025-02-28 NOTE — H&P
[facility] H&P/Consult note       CC:   Chief Complaint   Patient presents with    Abnormal Labs    Neck Pain        PCP: Norm Mendoza MD    History of Present Illness: Patient is a 84 year old female with PMH sig for htn, hld, chf, pAF sp watchman due to falls, pad sp LE stents, PD, DM here for abn outpt labs    She has had increasing fatigue, lack of energy, occasional dizziness. Had otupt labs that showed hgb of 7s. Recently in Dec was 10. She denied ayn bleeding, dark / melena stools, no abd pain, no nsaid use  Takes plavix  Also with L neck pain. Has chronic pain but this is more sharp /severe per pt. No f/c. No dysphagia.   Hx of falls     PMH  Past Medical History:    Arrhythmia    C2 cervical fracture (HCC)    C5 vertebral fracture (HCC)    CVA (cerebral infarction)    x2    GERD (gastroesophageal reflux disease)    HTN (hypertension)    SHANA (obstructive sleep apnea)    Osteoarthritis    Overactive bladder    Parkinson's disease (HCC)    Pulmonary hypertension (HCC)    RA (rheumatoid arthritis) (HCC)    Type II or unspecified type diabetes mellitus without mention of complication, not stated as uncontrolled    Unspecified sleep apnea    AHI 78 RDI 80 SaO2 deirdre 85 % CPAP 11  Sleep RX/ now HME    Vitamin D deficiency    Vitamin D deficiency        PSH  Past Surgical History:   Procedure Laterality Date    Cholecystectomy      Hernia surgery      Hip replacement surgery Bilateral     Knee replacement surgery Right     Other surgical history      \"Veins removed\"    Removal of heel spur Right     Tonsillectomy          ALL:  Allergies[1]     Home Medications:  Medications Taking[2]      Soc Hx  Social History     Tobacco Use    Smoking status: Never    Smokeless tobacco: Never   Substance Use Topics    Alcohol use: No     Alcohol/week: 0.0 standard drinks of alcohol        Fam Hx  Family History   Problem Relation Age of Onset    Other (Other) Mother         RA    Other (Other) Sister         RA    Other  (mitral valve prolapse) Sister     Other (Psoriasis) Brother        Review of Systems  Comprehensive ROS reviewed and negative except for what's stated above.        OBJECTIVE:  BP (!) 172/67   Pulse 65   Temp 98.1 °F (36.7 °C) (Temporal)   Resp 14   Ht 5' 2\" (1.575 m)   Wt 173 lb (78.5 kg)   SpO2 97%   BMI 31.64 kg/m²   General:  Alert, no distress, appears stated age.   Head:  Normocephalic,    Eyes:  Sclera anicteric    Throat: dry   Neck: Supple, ttp in left neck soft tissues. , no redness / warmth   Lungs:   Clear to auscultation bilaterally. Normal effort   Chest wall:  No tenderness or deformity.   Heart:  Regular rate and rhythm,    Abdomen:   Soft, NT/ND,    Extremities: Atraumatic, trace edema    Skin: No visible rashes or lesions.    Neurologic: Normal strength, no focal deficit appreciated     Diagnostic Data:    CBC/Chem  Recent Labs   Lab 02/27/25  0535 02/27/25  1255   WBC 7.5 7.9   HGB 7.0* 7.5*   MCV 87.6 87.2   .0 277.0       Recent Labs   Lab 02/27/25  0535 02/27/25  1255    137   K 4.8 4.5    100   CO2 30.0 30.0   BUN 31* 31*   CREATSERUM 1.15* 1.23*   * 102*   CA 10.2 10.3       Recent Labs   Lab 02/27/25  0535 02/27/25  1255   ALT <7* <7*   AST 14 15   ALB 4.1 4.7       No results for input(s): \"TROP\" in the last 168 hours.    Additional Diagnostics: ECG: sinus rhythm    CXR: image personally reviewed     Radiology: XR CHEST AP PORTABLE  (CPT=71045)    Result Date: 2/27/2025  PROCEDURE:  XR CHEST AP PORTABLE  (CPT=71045)  TECHNIQUE:  AP chest radiograph was obtained.  COMPARISON:  [facility] , XR, XR CHEST AP PORTABLE  (CPT=71045), 6/14/2024, 12:12 PM.  INDICATIONS:  hgb 7, possible GI bleed, neck pain, + thyroid nodules, IV in place, VS stable  PATIENT STATED HISTORY: (As transcribed by Technologist)  Patient offered no additional history at this time    FINDINGS:  Cardiomegaly with normal pulmonary vascularity. No pleural effusion or pneumothorax. No lobar  consolidation.  Mild atelectasis/scarring in the lower lungs.  Partially imaged cervical fusion hardware.  Degenerative changes the spine.            CONCLUSION:  No lobar pneumonia or overt congestive failure.  Mild atelectasis/scarring in the lower lungs.   LOCATION:  [facility]      Dictated by (CST): Adilson Pacheco MD on 2/27/2025 at 6:25 PM     Finalized by (CST): Adilson Pacheco MD on 2/27/2025 at 6:26 PM          ASSESSMENT / PLAN:     Patient is a 84 year old female with PMH sig for htn, hld, chf, pAF sp watchman due to falls, pad sp LE stents, PD, DM here for abn outpt labs    Impression    -acute on chronic anemia  -L neck pain  -hx of C spine fx and fusion    -pAF sp watchman in Sept  -secondary hypercoag state  -chronic diastolic CHF  -HTH  -HLD    -DM 2  -RA  -Parkinsons disease  -hx of falls  -PAD    Plan    *acute on chronic anemia  -unclear etiology, hgb was 10 in dec, now 7s  -she denied any bleeding, nsaid use  -transfuse 1unit given symptoms   -GI eval  -check iron stores, b12 folate, ldh / hapto    *CV  -on dapt given watchman, cont  -also with LE stents for PAD in Dec  -cont aspirin  -cont antresto  -hold diuretics for now, daily I/os and weights. Appears euvolemic  -cont statin    *ENT  -check CT given neck pain, hx of falls / prior surgery    *Endo  -ISS accuchecks    scds  DNR confirmed with pt and family at bs      Further recommendations pending patient's clinical course. [facility] hospitalist to continue to follow patient while in house    Patient and/or patient's family given opportunity to ask questions and note understanding and agreeing with therapeutic plan as outlined    Remington Quinones Hospitalist  [phone]  Answering Service: [phone]           [1]   Allergies  Allergen Reactions    Naproxen OTHER (SEE COMMENTS)     headaches    Adhesive Tape RASH     States makes skin \"raw\"    Chocolate RASH    Strawberries RASH   [2]   No outpatient medications have been marked as taking  for the 2/27/25 encounter (Hospital Encounter).

## 2025-02-28 NOTE — PLAN OF CARE
NURSING ADMISSION NOTE    Patient admitted via Cart  Oriented to room.  Safety precautions initiated.  Bed in low position.  Call light in reach.    Assumed care of pt at 2100. Pt a/o x4. Afebrile. Reporting chronic neck pain. Tolerating clears- NPO at midnight. Medication admin per MAR. CT c-spine completed. 1u RBC transfused. Admission navigator completed. GI to see. Call light within reach. Safety precautions in place.     Problem: HEMATOLOGIC - ADULT  Goal: Maintains hematologic stability  Description: INTERVENTIONS  - Assess for signs and symptoms of bleeding or hemorrhage  - Monitor labs and vital signs for trends  - Administer supportive blood products/factors, fluids and medications as ordered and appropriate  - Administer supportive blood products/factors as ordered and appropriate  Outcome: Progressing     Problem: HEMATOLOGIC - ADULT  Goal: Free from bleeding injury  Description: (Example usage: patient with low platelets)  INTERVENTIONS:  - Avoid intramuscular injections, enemas and rectal medication administration  - Ensure safe mobilization of patient  - Hold pressure on venipuncture sites to achieve adequate hemostasis  - Assess for signs and symptoms of internal bleeding  - Monitor lab trends  - Patient is to report abnormal signs of bleeding to staff  - Avoid use of toothpicks and dental floss  - Use electric shaver for shaving  - Use soft bristle tooth brush  - Limit straining and forceful nose blowing  Outcome: Progressing

## 2025-02-28 NOTE — PROGRESS NOTES
CC: follow-up hospital admission anemia    SUBJECTIVE:  Interval History:     Slept well, denied any neck pain today, states it hurts in certain positions when seh moves her head  No bleeding reported    OBJECTIVE:  Scheduled Meds:    insulin aspart  1-5 Units Subcutaneous TID AC and HS    aspirin  81 mg Oral Daily    carbidopa-levodopa  1 tablet Oral 3 times per day    clopidogrel  75 mg Oral Daily    fluticasone-salmeterol  1 puff Inhalation BID    folic acid  2 mg Oral Daily    gabapentin  300 mg Oral BID    metoprolol succinate ER  100 mg Oral BID    pantoprazole  40 mg Oral QAM AC    prednisoLONE  1 drop Left Eye Daily    sertraline  50 mg Oral Daily    atorvastatin  10 mg Oral Nightly     Continuous Infusions:   PRN Meds:   glucose **OR** glucose **OR** glucose-vitamin C **OR** dextrose **OR** glucose **OR** glucose **OR** glucose-vitamin C    acetaminophen    melatonin    PHYSICAL EXAM  Vital signs: Temp:  [98.1 °F (36.7 °C)-98.7 °F (37.1 °C)] 98.2 °F (36.8 °C)  Pulse:  [64-88] 72  Resp:  [12-24] 20  BP: (142-182)/(50-91) 147/67  SpO2:  [93 %-100 %] 99 %      GENERAL - NAD, AAO  EYES- sclera anicteric,   HENT- normocephalic   NECK - no ttp today on exam  CV- RRR  RESP - CTAB, normal resp effort  ABDOMEN- soft, NT/ND   EXT- no LE edema,        Data Review:   Labs:   Recent Labs   Lab 02/27/25  0535 02/27/25  1255 02/28/25  0540   WBC 7.5 7.9 6.6   HGB 7.0* 7.5* 8.3*   MCV 87.6 87.2 86.0   .0 277.0 226.0       Recent Labs   Lab 02/27/25  0535 02/27/25  1255 02/28/25  0540    137 138   K 4.8 4.5 4.2    100 104   CO2 30.0 30.0 27.0   BUN 31* 31* 25*   CREATSERUM 1.15* 1.23* 0.93   CA 10.2 10.3 10.0   MG  --   --  1.3*   * 102* 100*       Recent Labs   Lab 02/27/25  0535 02/27/25  1255 02/28/25  0540   ALT <7* <7* <7*   AST 14 15 15   ALB 4.1 4.7 4.1   LDH  --   --  154       No results for input(s): \"PGLU\" in the last 168 hours.        ASSESSMENT/PLAN:  Patient is a 84 year old female  with PMH sig for htn, hld, chf, pAF sp watchman due to falls, pad sp LE stents, PD, DM here for abn outpt labs     Impression     -acute on chronic anemia  -L neck pain  -hx of C spine fx and fusion     -pAF sp watchman in Sept  -secondary hypercoag state  -chronic diastolic CHF  -HTH  -HLD     -DM 2  -RA  -Parkinsons disease  -hx of falls  -PAD  -hypo Mg     Plan     *acute on chronic anemia  -unclear etiology, hgb was 10 in dec, now 7s  -she denied any bleeding, nsaid use  -transfused 1unit given symptoms, appropriate rise  -GI eval - plan for endoscopic eval  -mild iron def  -hemolysis labs neg  -b12 /folate pending     *CV  -on dapt given watchman, cont  -also with LE stents for PAD in Dec  -cont aspirin  -cont antresto  -hold diuretics for now, daily I/os and weights. Appears euvolemic. Will have cards see given recent watchman  -cont statin     *ENT  -check CT given neck pain, hx of falls / prior surgery - no fx noted  Her pain seems resolved today, likely MSK     *Endo  -ISS accuchecks     scds  DNR confirmed with pt and family at     Add PT OT      Will continue to follow while hospitalized. Please page me or the on-call hospitalist with questions or concerns.    Remington Quinones Hospitalist  [phone]  Answering Service: [phone]

## 2025-02-28 NOTE — ANESTHESIA POSTPROCEDURE EVALUATION
[facility]    Sandi Linder Patient Status:  Observation   Age/Gender 84 year old female MRN [id number]   Location [facility] END[facility] Attending Remington Hayes DO   Hosp Day # 0 PCP Norm Mendoza MD       Anesthesia Post-op Note    ESOPHAGOGASTRODUODENOSCOPY (EGD)    Procedure Summary       Date: 02/28/25 Room / Location:  ENDOSCOPY 03 / [facility] END[facility]    Anesthesia Start: 1250 Anesthesia Stop: 1301    Procedure: ESOPHAGOGASTRODUODENOSCOPY (EGD) Diagnosis: (mild gastritis)    Surgeons: Pantera Sexton MD Anesthesiologist: Tereso Webb MD    Anesthesia Type: MAC ASA Status: 3            Anesthesia Type: MAC    Vitals Value Taken Time   /42 02/28/25 1301   Temp 97.3 °F (36.3 °C) 02/28/25 1301   Pulse 65 02/28/25 1301   Resp 16 02/28/25 1301   SpO2 97 % 02/28/25 1301           Patient Location: Endoscopy    Anesthesia Type: MAC    Airway Patency: patent    Postop Pain Control: adequate    Mental Status: mildly sedated but able to meaningfully participate in the post-anesthesia evaluation    Nausea/Vomiting: none    Cardiopulmonary/Hydration status: stable euvolemic    Complications: no apparent anesthesia related complications    Postop vital signs: stable    Dental Exam: Unchanged from Preop    Patient to be discharged from PACU when criteria met.

## 2025-02-28 NOTE — ANESTHESIA PREPROCEDURE EVALUATION
PRE-OP EVALUATION    Patient Name: Sandi Linder    Admit Diagnosis: Anemia, unspecified type [D64.9]    Pre-op Diagnosis: Anemia    ESOPHAGOGASTRODUODENOSCOPY (EGD)    Anesthesia Procedure: ESOPHAGOGASTRODUODENOSCOPY (EGD)    Surgeons and Role:     * Pantera Sexton MD - Primary    Pre-op vitals reviewed.  Temp: 98 °F (36.7 °C)  Pulse: 68  Resp: 18  BP: 147/67  SpO2: 99 %  Body mass index is 32.26 kg/m².    Current medications reviewed.  Hospital Medications:   glucose (Dex4) 15 GM/59ML oral liquid 15 g  15 g Oral Q15 Min PRN    Or    glucose (Glutose) 40% oral gel 15 g  15 g Oral Q15 Min PRN    Or    glucose-vitamin C (Dex-4) chewable tab 4 tablet  4 tablet Oral Q15 Min PRN    Or    dextrose 50% injection 50 mL  50 mL Intravenous Q15 Min PRN    Or    glucose (Dex4) 15 GM/59ML oral liquid 30 g  30 g Oral Q15 Min PRN    Or    glucose (Glutose) 40% oral gel 30 g  30 g Oral Q15 Min PRN    Or    glucose-vitamin C (Dex-4) chewable tab 8 tablet  8 tablet Oral Q15 Min PRN    insulin aspart (NovoLOG) 100 Units/mL FlexPen 1-5 Units  1-5 Units Subcutaneous TID AC and HS    acetaminophen (Tylenol Extra Strength) tab 500 mg  500 mg Oral Q4H PRN    [COMPLETED] magnesium sulfate in sterile water for injection 2 g/50mL IVPB premix 2 g  2 g Intravenous Once    [] dextrose 5%-sodium chloride 0.45% infusion   Intravenous Continuous    [COMPLETED] metoprolol tartrate (Lopressor) tab 100 mg  100 mg Oral Once    aspirin DR tab 81 mg  81 mg Oral Daily    carbidopa-levodopa (SINEMET)  MG per tab 1 tablet  1 tablet Oral 3 times per day    clopidogrel (Plavix) tab 75 mg  75 mg Oral Daily    fluticasone-salmeterol (Advair Diskus) 250-50 MCG/ACT inhaler 1 puff  1 puff Inhalation BID    folic acid (Folvite) tab 2 mg  2 mg Oral Daily    gabapentin (Neurontin) cap 300 mg  300 mg Oral BID    metoprolol succinate ER (Toprol XL) 24 hr tab 100 mg  100 mg Oral BID    pantoprazole (Protonix) DR tab 40 mg  40 mg Oral QAM AC    prednisoLONE  (Pred Forte) 1 % ophthalmic suspension 1 drop  1 drop Left Eye Daily    sertraline (Zoloft) tab 50 mg  50 mg Oral Daily    atorvastatin (Lipitor) tab 10 mg  10 mg Oral Nightly    melatonin cap/tab 5 mg  5 mg Oral Nightly PRN       Outpatient Medications:   Prescriptions Prior to Admission[1]    Allergies: Naproxen, Adhesive tape, Chocolate, and Strawberries      Anesthesia Evaluation    Patient summary reviewed.    Anesthetic Complications           GI/Hepatic/Renal      (+) GERD                           Cardiovascular                (+) obesity  (+) hypertension                     (+) CHF                Endo/Other      (+) diabetes                     (+) arthritis       Pulmonary                    (+) sleep apnea       Neuro/Psych                 (+) neuromuscular disease                     Past Surgical History:   Procedure Laterality Date    Cholecystectomy      Hernia surgery      Hip replacement surgery Bilateral     Knee replacement surgery Right     Other surgical history      \"Veins removed\"    Removal of heel spur Right     Tonsillectomy       Social History     Socioeconomic History    Marital status:    Tobacco Use    Smoking status: Never    Smokeless tobacco: Never   Vaping Use    Vaping status: Never Used   Substance and Sexual Activity    Alcohol use: No     Alcohol/week: 0.0 standard drinks of alcohol    Drug use: No     History   Drug Use No     Available pre-op labs reviewed.  Lab Results   Component Value Date    WBC 6.6 02/28/2025    RBC 3.08 (L) 02/28/2025    HGB 8.3 (L) 02/28/2025    HCT 26.5 (L) 02/28/2025    MCV 86.0 02/28/2025    MCH 26.9 02/28/2025    MCHC 31.3 02/28/2025    RDW 15.4 02/28/2025    .0 02/28/2025     Lab Results   Component Value Date     02/28/2025    K 4.2 02/28/2025     02/28/2025    CO2 27.0 02/28/2025    BUN 25 (H) 02/28/2025    CREATSERUM 0.93 02/28/2025     (H) 02/28/2025    CA 10.0 02/28/2025            Airway      Mallampati:  II  Mouth opening: >3 FB  TM distance: > 6 cm  Neck ROM: full Cardiovascular    Cardiovascular exam normal.  Rhythm: regular  Rate: normal     Dental             Pulmonary    Pulmonary exam normal.                 Other findings              ASA: 3   Plan: MAC  NPO status verified and patient meets guidelines.          Plan/risks discussed with: patient and significant other                Present on Admission:  **None**             [1]   Medications Prior to Admission   Medication Sig Dispense Refill Last Dose/Taking    guaiFENesin-dextromethorphan 100-10 MG/5ML Oral Syrup Take 10 mL by mouth every 4 (four) hours as needed for cough.   Taking As Needed    acetaminophen 500 MG Oral Tab Take 2 tablets (1,000 mg total) by mouth in the morning and 2 tablets (1,000 mg total) before bedtime.       metFORMIN HCl 1000 MG Oral Tab Take 1 tablet (1,000 mg total) by mouth 2 (two) times daily with meals.       polypropylene glycol- 0.4-0.3 % Ophthalmic Solution Place 1 drop into both eyes in the morning, at noon, in the evening, and at bedtime.       gabapentin 300 MG Oral Cap Take 1 capsule (300 mg total) by mouth 2 (two) times daily.       carbidopa-levodopa  MG Oral Tab Take 1 tablet by mouth 3 (three) times daily. At 6am, 2pm, 7pm       sacubitril-valsartan 24-26 MG Oral Tab Take 1 tablet by mouth 2 (two) times daily.       furosemide 40 MG Oral Tab Take 1 tablet (40 mg total) by mouth daily.       spironolactone 25 MG Oral Tab Take 1 tablet (25 mg total) by mouth daily.       prednisoLONE 1 % Ophthalmic Suspension Place 1 drop into the left eye daily.       clopidogrel 75 MG Oral Tab Take 1 tablet (75 mg total) by mouth daily. 30 tablet 0     fluticasone-salmeterol 115-21 MCG/ACT Inhalation Aerosol Inhale 2 puffs into the lungs 2 (two) times daily.       pantoprazole 40 MG Oral Tab EC Take 1 tablet (40 mg total) by mouth every morning before breakfast.       acetaminophen 500 MG Oral Tab Take 1 tablet (500 mg  total) by mouth in the morning and 1 tablet (500 mg total) before bedtime.       cyanocobalamin 1000 MCG/ML Injection Solution Inject 1 mL (1,000 mcg total) into the muscle every 30 (thirty) days. On the 1st of every month       folic acid 1 MG Oral Tab Take 2 tablets (2 mg total) by mouth daily. 1400       fluticasone propionate 50 MCG/ACT Nasal Suspension 2 sprays by Each Nare route daily.       ERGOCALCIFEROL 1.25 MG (58662 UT) Oral Cap TAKE 1 CAPSULE BY MOUTH EVERY 14 DAYS 6 capsule 1     METOPROLOL SUCCINATE 100 MG Oral Tablet 24 Hr TAKE 1 TABLET(100 MG) BY MOUTH TWICE DAILY 180 tablet 0     Sertraline HCl 50 MG Oral Tab Take 1 tablet (50 mg total) by mouth daily. 30 tablet 3     SIMVASTATIN 20 MG Oral Tab TAKE 1 TABLET BY MOUTH EVERY DAY 90 tablet 3     ASPIRIN LOW DOSE 81 MG Oral Tab EC TAKE 1 TABLET BY MOUTH TWICE DAILY 180 tablet 3     Insulin Pen Needle (BD PEN NEEDLE MINI U/F) 31G X 5 MM Does not apply Misc Use one needle daily to administer forteo injections 90 each 3     Glucose Blood (ACCU-CHEK SMARTVIEW) In Vitro Strip USE TO TEST THREE TIMES DAILY OR AS DIRECTED 300 strip 2     ACCU-CHEK FASTCLIX LANCETS Does not apply Misc TEST THREE TIMES DAILY OR AS DIRECTED 306 each 1

## 2025-02-28 NOTE — PLAN OF CARE
Patient is A/O x 4. VSS. Afebrile. Room air at baseline but requires 1-2L O2 while asleep. SHANA. Complaints of chronic neck pain; declined PRN medication. Patient NPO for planned EGD today, consent signed and placed in chart. Medications held d/t NPO status. Electrolytes replaced per protocol. Patient chair fast at baseline (wheelchair) voids per bedpan. PIV saline locked. Safety precautions in place. Call light within reach. Daughter updated on POC vis telephone.  1400: patient received from ENDO A/O. Diet resumed.  1430: Patient coughing increased in frequency. MD notified. Chxr ordered.  Problem: HEMATOLOGIC - ADULT  Goal: Maintains hematologic stability  Description: INTERVENTIONS  - Assess for signs and symptoms of bleeding or hemorrhage  - Monitor labs and vital signs for trends  - Administer supportive blood products/factors, fluids and medications as ordered and appropriate  - Administer supportive blood products/factors as ordered and appropriate  Outcome: Progressing  Goal: Free from bleeding injury  Description: (Example usage: patient with low platelets)  INTERVENTIONS:  - Avoid intramuscular injections, enemas and rectal medication administration  - Ensure safe mobilization of patient  - Hold pressure on venipuncture sites to achieve adequate hemostasis  - Assess for signs and symptoms of internal bleeding  - Monitor lab trends  - Patient is to report abnormal signs of bleeding to staff  - Avoid use of toothpicks and dental floss  - Use electric shaver for shaving  - Use soft bristle tooth brush  - Limit straining and forceful nose blowing  Outcome: Progressing

## 2025-03-01 ENCOUNTER — APPOINTMENT (OUTPATIENT)
Dept: CT IMAGING | Facility: HOSPITAL | Age: 85
End: 2025-03-01
Attending: HOSPITALIST
Payer: MEDICARE

## 2025-03-01 VITALS
BODY MASS INDEX: 31.77 KG/M2 | OXYGEN SATURATION: 96 % | RESPIRATION RATE: 20 BRPM | WEIGHT: 172.63 LBS | HEART RATE: 63 BPM | HEIGHT: 62 IN | TEMPERATURE: 98 F | DIASTOLIC BLOOD PRESSURE: 62 MMHG | SYSTOLIC BLOOD PRESSURE: 140 MMHG

## 2025-03-01 LAB
ANION GAP SERPL CALC-SCNC: 7 MMOL/L (ref 0–18)
BLOOD TYPE BARCODE: 9500
BUN BLD-MCNC: 22 MG/DL (ref 9–23)
CALCIUM BLD-MCNC: 9.8 MG/DL (ref 8.7–10.6)
CHLORIDE SERPL-SCNC: 104 MMOL/L (ref 98–112)
CO2 SERPL-SCNC: 29 MMOL/L (ref 21–32)
CREAT BLD-MCNC: 1.08 MG/DL
EGFRCR SERPLBLD CKD-EPI 2021: 50 ML/MIN/1.73M2 (ref 60–?)
ERYTHROCYTE [DISTWIDTH] IN BLOOD BY AUTOMATED COUNT: 15.4 %
GLUCOSE BLD-MCNC: 110 MG/DL (ref 70–99)
GLUCOSE BLD-MCNC: 115 MG/DL (ref 70–99)
GLUCOSE BLD-MCNC: 126 MG/DL (ref 70–99)
HCT VFR BLD AUTO: 27.5 %
HGB BLD-MCNC: 8.6 G/DL
MAGNESIUM SERPL-MCNC: 1.9 MG/DL (ref 1.6–2.6)
MCH RBC QN AUTO: 26.7 PG (ref 26–34)
MCHC RBC AUTO-ENTMCNC: 31.3 G/DL (ref 31–37)
MCV RBC AUTO: 85.4 FL
OSMOLALITY SERPL CALC.SUM OF ELEC: 294 MOSM/KG (ref 275–295)
PLATELET # BLD AUTO: 224 10(3)UL (ref 150–450)
POTASSIUM SERPL-SCNC: 4.3 MMOL/L (ref 3.5–5.1)
RBC # BLD AUTO: 3.22 X10(6)UL
SODIUM SERPL-SCNC: 140 MMOL/L (ref 136–145)
UNIT VOLUME: 350 ML
WBC # BLD AUTO: 8.1 X10(3) UL (ref 4–11)

## 2025-03-01 PROCEDURE — 80048 BASIC METABOLIC PNL TOTAL CA: CPT | Performed by: HOSPITALIST

## 2025-03-01 PROCEDURE — 97530 THERAPEUTIC ACTIVITIES: CPT

## 2025-03-01 PROCEDURE — 97165 OT EVAL LOW COMPLEX 30 MIN: CPT

## 2025-03-01 PROCEDURE — 70450 CT HEAD/BRAIN W/O DYE: CPT | Performed by: HOSPITALIST

## 2025-03-01 PROCEDURE — 83735 ASSAY OF MAGNESIUM: CPT | Performed by: HOSPITALIST

## 2025-03-01 PROCEDURE — 85027 COMPLETE CBC AUTOMATED: CPT | Performed by: HOSPITALIST

## 2025-03-01 PROCEDURE — 82962 GLUCOSE BLOOD TEST: CPT

## 2025-03-01 RX ORDER — ASPIRIN 81 MG/1
81 TABLET ORAL DAILY
Status: SHIPPED | COMMUNITY
Start: 2025-03-02

## 2025-03-01 RX ORDER — FERROUS SULFATE 325(65) MG
325 TABLET, DELAYED RELEASE (ENTERIC COATED) ORAL
Qty: 30 TABLET | Refills: 0 | Status: SHIPPED | COMMUNITY
Start: 2025-03-01

## 2025-03-01 RX ORDER — SPIRONOLACTONE 25 MG/1
25 TABLET ORAL DAILY
Status: DISCONTINUED | OUTPATIENT
Start: 2025-03-01 | End: 2025-03-01

## 2025-03-01 RX ORDER — FUROSEMIDE 40 MG/1
40 TABLET ORAL DAILY
Status: DISCONTINUED | OUTPATIENT
Start: 2025-03-01 | End: 2025-03-01

## 2025-03-01 NOTE — DISCHARGE SUMMARY
[facility] Discharge Summary    Patient ID  Sandi Linder  [id number]  84 year old  8/2/1940    Admit date: 2/27/2025    Discharge date: 03/01/25    Attending: Remington Hayes DO     Primary Care Physician: Norm Mendoza MD      Reason for admission: anemia    Discharge condition: stable    Disposition: home    Important follow up:  -PCP within 7 d  -specialists:      -labs:    -radiology:      Additional patient instructions     Repeat hemoglobin in 3-4 days at rehab center    From cardiology stand point, patient does not need to be on both aspirin and Plavix. Can be on one of those medications.     Given her recent stents in the legs - please reach out to vascular surgery team early next week and see if can stop one of the antiplatelet medications (aspirin or plavix) and to remain on only one of those.         Discharge med list     Medication List        CONTINUE taking these medications      Accu-Chek FastClix Lancets Misc  TEST THREE TIMES DAILY OR AS DIRECTED     Insulin Pen Needle 31G X 5 MM Misc  Commonly known as: BD Pen Needle Mini U/F  Use one needle daily to administer forteo injections            ASK your doctor about these medications      Accu-Chek SmartView Strp  USE TO TEST THREE TIMES DAILY OR AS DIRECTED     * acetaminophen 500 MG Tabs  Commonly known as: Tylenol Extra Strength     * acetaminophen 500 MG Tabs  Commonly known as: Tylenol Extra Strength     Aspirin Low Dose 81 MG Tbec  Generic drug: aspirin  TAKE 1 TABLET BY MOUTH TWICE DAILY     carbidopa-levodopa  MG Tabs  Commonly known as: SINEMET     clopidogrel 75 MG Tabs  Commonly known as: Plavix  Take 1 tablet (75 mg total) by mouth daily.     cyanocobalamin 1000 MCG/ML Soln  Commonly known as: Vitamin B12     ergocalciferol 1.25 MG (10981 UT) Caps  Commonly known as: Vitamin D2  TAKE 1 CAPSULE BY MOUTH EVERY 14 DAYS     fluticasone propionate 50 MCG/ACT Susp  Commonly known as: Flonase     fluticasone-salmeterol 115-21  MCG/ACT Aero  Commonly known as: ADVAIR HFA     folic acid 1 MG Tabs  Commonly known as: Folvite     furosemide 40 MG Tabs  Commonly known as: Lasix     gabapentin 300 MG Caps  Commonly known as: Neurontin     guaiFENesin-dextromethorphan 100-10 MG/5ML Syrp  Commonly known as: Robitussin DM     metFORMIN HCl 1000 MG Tabs  Commonly known as: GLUCOPHAGE  Ask about: Which instructions should I use?     metoprolol succinate  MG Tb24  Commonly known as: Toprol XL  TAKE 1 TABLET(100 MG) BY MOUTH TWICE DAILY     pantoprazole 40 MG Tbec  Commonly known as: Protonix     polypropylene glycol- 0.4-0.3 % Soln  Commonly known as: Systane     prednisoLONE 1 % Susp  Commonly known as: Pred Forte     sacubitril-valsartan 24-26 MG Tabs  Commonly known as: Entresto     sertraline 50 MG Tabs  Commonly known as: Zoloft  Take 1 tablet (50 mg total) by mouth daily.     simvastatin 20 MG Tabs  Commonly known as: Zocor  TAKE 1 TABLET BY MOUTH EVERY DAY     spironolactone 25 MG Tabs  Commonly known as: Aldactone           * This list has 2 medication(s) that are the same as other medications prescribed for you. Read the directions carefully, and ask your doctor or other care provider to review them with you.                  Discharge Diagnoses:  -acute on chronic anemia  -L neck pain  -hx of C spine fx and fusion     -pAF sp watchman in Sept  -secondary hypercoag state  -chronic diastolic CHF  -HTH  -HLD     -DM 2  -RA  -Parkinsons disease  -hx of falls  -PAD  -hypo Mg    Consults:  IP CONSULT TO GASTROENTEROLOGY  IP CONSULT TO HOSPITALIST  IP CONSULT TO CARDIOLOGY    Radiology:  CT BRAIN OR HEAD (CPT=70450)    Result Date: 3/1/2025  PROCEDURE:  CT BRAIN OR HEAD (04706)  COMPARISON:  [facility] , CT, CT BRAIN OR HEAD (90057), 7/22/2024, 4:55 PM.  INDICATIONS:  Facial droop, laterality not specified.  TECHNIQUE:  Noncontrast CT scanning is performed through the brain. Dose reduction techniques were used. Dose information is  transmitted to the ACR (American College of Radiology) NRDR (National Radiology Data Registry) which includes the Dose Index Registry.  PATIENT STATED HISTORY: (As transcribed by Technologist)  facial droop.     FINDINGS:   VENTRICLES/SULCI:   Ventricles and sulci are prominent indicating atrophy.  INTRACRANIAL:  There are no abnormal extraaxial fluid collections.  There is no midline shift.  There are no acute appearing intraparenchymal brain abnormalities.  There is nothing specific for acute territorial infarct.  There is no hemorrhage or mass lesion.  There is chronic microvascular ischemic white matter disease in the cerebrum bilaterally.  These changes are modest.  Small old right cerebellar infarct image 12 seen previously image 8 on the prior.  Old subinsular infarct image 23 present on image 20 from the prior exam.  SINUSES:  No acute sinusitis.   MASTOIDS:  The mastoids are clear.  SKULL:  No evidence for fracture or acute osseous abnormality.  OTHER:  None.            CONCLUSION:  Chronic changes in the brain, as described, but no acute intracranial bleed, or other acute intracranial process identified.  For new onset neurologic symptoms if further imaging needed, consider MRI follow-up.    LOCATION:  [facility]   Dictated by (CST): Sascha Fontenot MD on 3/01/2025 at 1:28 PM     Finalized by (CST): Sascha Fontenot MD on 3/01/2025 at 1:30 PM       XR CHEST AP PORTABLE  (CPT=71045)    Result Date: 2/28/2025  PROCEDURE:  XR CHEST AP PORTABLE  (CPT=71045)  TECHNIQUE:  AP chest radiograph was obtained.  COMPARISON:  [facility] , XR, XR CHEST AP PORTABLE  (CPT=71045), 2/27/2025, 6:12 PM.  INDICATIONS:  Cough and shortness of breath.  PATIENT STATED HISTORY: (As transcribed by Technologist)  Patient offered no additional history at this time.    FINDINGS:  Stable degree of cardiomegaly.  The lungs are clear.  Normal pulmonary vasculature.  No pleural disease.  No acute process or significant interval changes are noted.             CONCLUSION:  1. Able degree of cardiomegaly. 2. Lungs are clear without evidence of an acute process or significant interval changes.   LOCATION:  [facility]      Dictated by (CST): Jelani Caba DO on 2/28/2025 at 4:44 PM     Finalized by (CST): Jelani Caba DO on 2/28/2025 at 4:45 PM       CT SPINE CERVICAL (CPT=72125)    Result Date: 2/27/2025  PROCEDURE:  CT SPINE CERVICAL (CPT=72125)  COMPARISON:  [facility] , CT, CT SPINE CERVICAL (CPT=72125), 7/22/2024, 5:02 PM.  INDICATIONS:  Neck pain  TECHNIQUE:  Noncontrast CT scanning of the cervical spine is performed from the skull base through C7.  Multiplanar reconstructions are generated.  Dose reduction techniques were used. Dose information is transmitted to the ACR (American College of Radiology) NRDR (National Radiology Data Registry) which includes the Dose Index Registry.  PATIENT STATED HISTORY: (As transcribed by Technologist)  Neck pain. Cervical fusion.   FINDINGS:  No acute fracture or subluxation in the cervical spine.  Posterior cervical spinal fusion changes are again noted at C2-C6.  No evidence of hardware complication.  Beam hardening artifact in the surgical hole over limits evaluation of surrounding structures.  There are bony changes of ankylosing spondylitis noted.  Vertebral body heights are well-maintained.  Moderate degenerative changes at the C1-2 articulation.  Straightening of normal cervical lordosis with anatomic alignment.  Mild to moderate facet arthropathy throughout the cervical spine.   Evaluation of the spinal canal is limited due to the beam hardening artifact in the surgical hardware.  No obvious high-grade spinal canal stenosis identified.  No high-grade neural foraminal narrowing is seen.  The paraspinal soft tissues are grossly unremarkable.  Heterogenous thyroid gland.  Scattered vascular calcifications.  The partially imaged posterior fossa demonstrates an old right cerebellar infarct that is unchanged.  Scattered  atelectasis/scarring in the partially imaged upper lungs.  Stable noncalcified pulmonary nodules in the medial left upper lobe measuring up to 4 mm on image 129 series 301.             CONCLUSION:   1. No acute fracture or subluxation in the cervical spine.  Degenerative and postoperative changes as above.  2. Stable noncalcified pulmonary nodules in the partially imaged left upper lobe measuring up to 4 mm as above. Followup as per Fleischner criteria is suggested.   Please see above for further details.   LOCATION:  [facility]   Dictated by (CST): Adilson Pacheco MD on 2/27/2025 at 11:46 PM     Finalized by (CST): Adilson Pacheco MD on 2/27/2025 at 11:49 PM       XR CHEST AP PORTABLE  (CPT=71045)    Result Date: 2/27/2025  PROCEDURE:  XR CHEST AP PORTABLE  (CPT=71045)  TECHNIQUE:  AP chest radiograph was obtained.  COMPARISON:  [facility] , XR, XR CHEST AP PORTABLE  (CPT=71045), 6/14/2024, 12:12 PM.  INDICATIONS:  hgb 7, possible GI bleed, neck pain, + thyroid nodules, IV in place, VS stable  PATIENT STATED HISTORY: (As transcribed by Technologist)  Patient offered no additional history at this time    FINDINGS:  Cardiomegaly with normal pulmonary vascularity. No pleural effusion or pneumothorax. No lobar consolidation.  Mild atelectasis/scarring in the lower lungs.  Partially imaged cervical fusion hardware.  Degenerative changes the spine.            CONCLUSION:  No lobar pneumonia or overt congestive failure.  Mild atelectasis/scarring in the lower lungs.   LOCATION:  [facility]      Dictated by (CST): Adilson Pacheco MD on 2/27/2025 at 6:25 PM     Finalized by (CST): Adilson Pacheco MD on 2/27/2025 at 6:26 PM         Operative reports:  Procedure(s) (LRB):  ESOPHAGOGASTRODUODENOSCOPY (EGD) (N/A)    Hospital course:    Patient is a 84 year old female with PMH sig for htn, hld, chf, pAF sp watchman due to falls, pad sp LE stents, PD, DM here for abn outpt labs     *acute on chronic anemia  -unclear etiology, hgb was 10 in  dec, now 7s  -she denied any bleeding, nsaid use  -transfused 1unit given symptoms, appropriate rise. DC hgb mid 8s  -GI eval - EGD with gastritis - ok to continue Plavix  -mild iron def - dc on iron supplements  -hemolysis labs neg  -b12 /folate normal     *CV  -resume home medications  -from cards standpoint pt can be on a single antiplatelet agent (either aspirin or plavix), however pt has recent vascular stents in legs and family to reach out to primary vascular team to see if can stop one of the medications. Dw daughter she cannot recall name of physician who they saw.      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 Reports long term symptoms on and off     *Endo  -ISS accuchecks     Neuro  -mild left facial droop noted, will check ct head to eval for stroke - neg for acute findigns    Day of discharge exam:  Vitals:    03/01/25 1230   BP: 140/62   Pulse: 63   Resp: 20   Temp: 97.5 °F (36.4 °C)        Dw daughter   Total time coordinating care 32 min       Patient and/or family had opportunity to ask questions and expressed understanding and agreement with therapeutic plan as outlined         Remington Quinones Hospitalist  [phone]  Answering Service: [phone]

## 2025-03-01 NOTE — PLAN OF CARE
Received pt a/o x4. 2L overnight. VSS. Afebrile. Denies pain. Medication admin per MAR. Tolerating diet. Voiding. Call light within reach. Safety precautions in place.     Problem: HEMATOLOGIC - ADULT  Goal: Maintains hematologic stability  Description: INTERVENTIONS  - Assess for signs and symptoms of bleeding or hemorrhage  - Monitor labs and vital signs for trends  - Administer supportive blood products/factors, fluids and medications as ordered and appropriate  - Administer supportive blood products/factors as ordered and appropriate  Outcome: Progressing     Problem: HEMATOLOGIC - ADULT  Goal: Free from bleeding injury  Description: (Example usage: patient with low platelets)  INTERVENTIONS:  - Avoid intramuscular injections, enemas and rectal medication administration  - Ensure safe mobilization of patient  - Hold pressure on venipuncture sites to achieve adequate hemostasis  - Assess for signs and symptoms of internal bleeding  - Monitor lab trends  - Patient is to report abnormal signs of bleeding to staff  - Avoid use of toothpicks and dental floss  - Use electric shaver for shaving  - Use soft bristle tooth brush  - Limit straining and forceful nose blowing  Outcome: Progressing

## 2025-03-01 NOTE — PROGRESS NOTES
NURSING DISCHARGE NOTE    Discharged Nursing home via Ambulance.  Accompanied by Family member and Support staff  Belongings Taken by patient/family.  Pt is aaox4, denies pain, had CT head scan done, Dr. Hayes aware of result, pt discharged to [facility], pt and daughter are aware, vitals stable.

## 2025-03-01 NOTE — PHYSICAL THERAPY NOTE
Following for PT.  D/w OT.  Pt presents at baseline level of functioning, no skilled PT required.  Will dc from PT at this time.

## 2025-03-01 NOTE — PROGRESS NOTES
CC: follow-up hospital admission anemia    SUBJECTIVE:  Interval History:     No acute issues overnight  No bleeding reported    OBJECTIVE:  Scheduled Meds:    insulin aspart  1-5 Units Subcutaneous TID AC and HS    famotidine  20 mg Oral Daily    aspirin  81 mg Oral Daily    carbidopa-levodopa  1 tablet Oral 3 times per day    clopidogrel  75 mg Oral Daily    fluticasone-salmeterol  1 puff Inhalation BID    folic acid  2 mg Oral Daily    gabapentin  300 mg Oral BID    metoprolol succinate ER  100 mg Oral BID    prednisoLONE  1 drop Left Eye Daily    sertraline  50 mg Oral Daily    atorvastatin  10 mg Oral Nightly     Continuous Infusions:    lactated ringers       PRN Meds:   glucose **OR** glucose **OR** glucose-vitamin C **OR** dextrose **OR** glucose **OR** glucose **OR** glucose-vitamin C    acetaminophen    ipratropium-albuterol    melatonin    PHYSICAL EXAM  Vital signs: Temp:  [97.3 °F (36.3 °C)-98.3 °F (36.8 °C)] 97.8 °F (36.6 °C)  Pulse:  [60-73] 60  Resp:  [15-27] 20  BP: (104-161)/(40-73) 161/73  SpO2:  [86 %-100 %] 100 %      GENERAL - NAD, AAO  EYES- sclera anicteric,   HENT- normocephalic   NECK - supple  CV- RRR  RESP - CTAB, normal resp effort  ABDOMEN- soft, NT/ND   EXT- no LE edema,   Neuor - mild left facial droop noted, otherwise strength symmetrical, no drift, normal ftn       Data Review:   Labs:   Recent Labs   Lab 02/27/25  0535 02/27/25  1255 02/28/25  0540 03/01/25  0541   WBC 7.5 7.9 6.6 8.1   HGB 7.0* 7.5* 8.3* 8.6*   MCV 87.6 87.2 86.0 85.4   .0 277.0 226.0 224.0       Recent Labs   Lab 02/27/25  0535 02/27/25  1255 02/28/25  0540 03/01/25  0541    137 138 140   K 4.8 4.5 4.2 4.3    100 104 104   CO2 30.0 30.0 27.0 29.0   BUN 31* 31* 25* 22   CREATSERUM 1.15* 1.23* 0.93 1.08*   CA 10.2 10.3 10.0 9.8   MG  --   --  1.3* 1.9   * 102* 100* 110*       Recent Labs   Lab 02/27/25  0535 02/27/25  1255 02/28/25  0540   ALT <7* <7* <7*   AST 14 15 15   ALB 4.1 4.7 4.1    LDH  --   --  154       Recent Labs   Lab 02/28/25  0855 02/28/25  1144 02/28/25  1803 02/28/25  2111 03/01/25  0503   PGLU 136* 140* 140* 175* 115*           ASSESSMENT/PLAN:  Patient is a 84 year old female with PMH sig for htn, hld, chf, pAF sp watchman due to falls, pad sp LE stents, PD, DM here for abn outpt labs     Impression     -acute on chronic anemia  -L neck pain  -hx of C spine fx and fusion     -pAF sp watchman in Sept  -secondary hypercoag state  -chronic diastolic CHF  -HTH  -HLD     -DM 2  -RA  -Parkinsons disease  -hx of falls  -PAD  -hypo Mg     Plan     *acute on chronic anemia  -unclear etiology, hgb was 10 in dec, now 7s  -she denied any bleeding, nsaid use  -transfused 1unit given symptoms, appropriate rise  -GI eval - EGD with gastritis - ok to continue Plavix  -mild iron def  -hemolysis labs neg  -b12 /folate normal     *CV  -on dapt given watchman, cont  -also with LE stents for PAD in Dec  -cont aspirin  -cont antresto  -resme diuretics   -cont statin     *ENT  -check CT given neck pain, hx of falls / prior surgery - no fx noted  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 Reports long term symptoms on and off     *Endo  -ISS accuchecks    Neuro  -mild left facial droop noted, will check ct head to eval for stroke     scds  DNR confirmed with pt and family at bs    Add PT OT    Dispo - if ct head neg and no ok with consultants, can likely dc later today    Will continue to follow while hospitalized. Please page me or the on-call hospitalist with questions or concerns.    Remington Quinones Hospitalist  [phone]  Answering Service: [phone]

## 2025-03-01 NOTE — DISCHARGE INSTRUCTIONS
Repeat hemoglobin in 3-4 days at rehab center    From cardiology stand point, patient does not need to be on both aspirin and Plavix. Can be on one of those medications.     Given her recent stents in the legs - please reach out to vascular surgery team early next week and see if can stop one of the antiplatelet medications (aspirin or plavix) and to remain on only one of those.

## 2025-03-01 NOTE — CM/SW NOTE
03/01/25 1500   CM/SW Referral Data   Referral Source Nurse   Reason for Referral Discharge planning   Informant EMR;Clinical Staff Member   Patient Info   Patient's Current Mental Status at Time of Assessment Alert;Oriented   Patient's Home Environment Assisted Living   Post Acute Care Provider Upon Admission  Assisted   Patient lives with Alone   Discharge Needs   Anticipated D/C needs Transportation services       Received call from pt's RN who stated pt can be discharged today and will need transportation.  Pt is an 85 y/o woman admitted for anemia.  She resides at [facility] living [facility].  She is wheelchair bound at baseline.    Contacted [facility] and received confirmation that pt can return to her apartment today.  They do not have a nurse working this weekend and so need for  RN to call for report.    [facility] transport scheduled for 4pm.  PCS form completed and available for RN to print.  They will send a stretcher at medicar rates.  Updated pt's RN.  / to remain available for support and/or discharge planning.     [facility]usum Gonzales [facility]  Discharge Planner  [phone]

## 2025-03-01 NOTE — OCCUPATIONAL THERAPY NOTE
OCCUPATIONAL THERAPY EVALUATION - INPATIENT    Room Number: 423/423-A  Evaluation Date: 3/1/2025     Type of Evaluation: Initial  Presenting Problem: acute on chronic anemia, 2/28 upper endoscopy    Physician Order: IP Consult to Occupational Therapy  Reason for Therapy:  ADL/IADL Dysfunction and Discharge Planning    OCCUPATIONAL THERAPY ASSESSMENT   Patient is a 84 year old female admitted on 2/27/2025 with Presenting Problem: acute on chronic anemia, 2/28 upper endoscopy. Co-Morbidities : CVA, C2 and C5 fracture, HTN, OA, PD, RA, pulmonary HTN, DM, B KWAKU, R TKA, Watchman device  Patient is currently functioning near baseline with  all ADL .  Prior to admission, patient's baseline is at [facility], Modified independent pivot transfer bed/wheelchair/toilet, Modified independent with dressing and toileting, and assistance with showering. Does not take steps.  Patient met all OT goals at baseline level.  Patient reports no further questions/concerns at this time.     EVALUATION SESSION:  Patient at start of session: supine    FUNCTIONAL TRANSFER ASSESSMENT  Sit to Stand: Edge of Bed  Edge of Bed: Supervision    BED MOBILITY  Supine to Sit : Independent  Sit to Supine (OT): Not Tested  Scooting: independent    BALANCE ASSESSMENT  Static Sitting: Independent  Static Standing: Supervision    COGNITION  Overall Cognitive Status:  WFL - within functional limits    COGNITION ASSESSMENTS     Upper Extremity:   ROM: within functional limits   Strength: is within functional limits     EDUCATION PROVIDED  Patient Education : Role of Occupational Therapy; Plan of Care; Proper Body Mechanics  Patient's Response to Education: Returned Demonstration    Equipment used: none    Therapist comments: pleasant.  Pt was able to describe in detail about her transfer sequencing bed to chair.  Independent supine to sit, independent scooting forward to edge of bed, supervision to partially stand. Pt typically holds onto bed railing when  partially standing and turning to the chair.  Pt was able to hold OT's hand as a rail and turn to transfer to the chair.  Able to simulate seated ADL at Modified independent level.  Alarm on.       Patient End of Session: Up in chair;Needs met;Call light within reach;RN aware of session/findings;All patient questions and concerns addressed;Alarm set    OCCUPATIONAL PROFILE    HOME SITUATION  Type of Home: Assisted living facility ([facility])     Lives With: Staff 24 hours    Toilet and Equipment: Comfort height toilet;Grab bar  Shower/Tub and Equipment: Walk-in shower;Grab bar;Shower chair             Drives: No  Patient Regularly Uses: Wheelchair    Prior Level of Function: [facility], Modified independent pivot transfer bed/wheelchair/toilet, Modified independent with dressing and toileting, and assistance with showering. Does not take steps. Patient enjoys caring for Evelin, the guinea pig.  Pt sings to her and feeds her tomatoes and cucumbers. Pt commented, \"I should name her Miss Bonilla, because she loves to eat.\"  \"She is smart, she comes out of the house when I sing and goes back to the house when I am done singing to her.\"     SUBJECTIVE  See above    PAIN ASSESSMENT  Ratin          OBJECTIVE  Precautions: Bed/chair alarm  Fall Risk: Standard fall risk    WEIGHT BEARING RESTRICTION       AM-PAC ‘6-Clicks’ Inpatient Daily Activity Short Form  -   Putting on and taking off regular lower body clothing?: A Little  -   Bathing (including washing, rinsing, drying)?: A Little  -   Toileting, which includes using toilet, bedpan or urinal? : A Little  -   Putting on and taking off regular upper body clothing?: None  -   Taking care of personal grooming such as brushing teeth?: None  -   Eating meals?: None    AM-PAC Score:  Score: 21  Approx Degree of Impairment: 32.79%  Standardized Score (AM-PAC Scale): 44.27        PLAN   Patient has been evaluated and presents with no skilled Occupational Therapy needs  at this time.  Patient discharged from Occupational Therapy services.  Please re-order if a new functional limitation presents during this admission.    OT Device Recommendations: None    Patient Evaluation Complexity Level:   Occupational Profile/Medical History LOW - Brief history including review of medical or therapy records    Specific performance deficits impacting engagement in ADL/IADL LOW  1 - 3 performance deficits    Client Assessment/Performance Deficits LOW - No comorbidities nor modifications of tasks    Clinical Decision Making LOW - Analysis of occupational profile, problem-focused assessments, limited treatment options    Overall Complexity LOW     OT Session Time: 20 minutes  Self-Care Home Management: 2 minutes  Therapeutic Activity: 8 minutes

## 2025-03-01 NOTE — PROGRESS NOTES
[facility]   part of [facility]    Cardiology Progress Note    Sandi Linder Patient Status:  Observation    1940 MRN [id number]   Location [facility] 4NW-A Attending Remington Hayes,    Hosp Day # 0 PCP Norm Mendoza MD       Impression/Plan:  84 year old female presenting with:    Anemia  Acute on chronic  GI eval negative for active bleed 25; EGD 2025 with gastritis  PAF  S/P Watchman 2024  On DAPT PTA (held in house for anemia)  Evidence of endothelialization at 45 day CTA  HTN  Dyslipidemia  DM  Hx falls  PAD  Parkinson's    - Cont statin, bb, arni, mra  - Cont home dose po lasix  - From cardiac standpoint patient no longer needs DAPT, single therapy sufficient (ie either aspirin or plavix)  - Monitor on tele  - Will follow     Subjective: No events overnight.  Today patient resting comfortably without acute complaints.    Patient Active Problem List   Diagnosis    Seronegative rheumatoid arthritis (HCC)    PD (Parkinson's disease) (HCC)    SHANA (obstructive sleep apnea)    Dermatophytosis of nail    Primary osteoarthritis involving multiple joints    Palpitations    Vitamin D deficiency    Type 2 diabetes mellitus with diabetic nephropathy (HCC)    Type 2 diabetes mellitus with diabetic polyneuropathy (HCC)    Mild nonproliferative diabetic retinopathy without macular edema associated with type 2 diabetes mellitus (HCC)    Age-related osteoporosis without current pathological fracture    Positive QuantiFERON-TB Gold test    S/P cervical spinal fusion    Gait instability    Essential hypertension    H/O cervical fracture    Type 2 diabetes mellitus with circulatory disorder, without long-term current use of insulin (HCC)    Presbycusis of both ears    Bilateral carotid artery stenosis    Other hyperlipidemia    Cervical dystocia (HCC)    Closed fracture of metatarsal neck, right, initial encounter    Primary osteoarthritis of left knee    Pneumonia due to COVID-19 virus     Hypoxia    Anemia    Azotemia    Acute on chronic congestive heart failure, unspecified heart failure type (HCC)    Pre-op testing    A-fib (HCC)    Anemia, unspecified type       Objective:   Temp: 97.8 °F (36.6 °C)  Pulse: 58  Resp: 20  BP: 155/61    Intake/Output:     Intake/Output Summary (Last 24 hours) at 3/1/2025 1303  Last data filed at 3/1/2025 0600  Gross per 24 hour   Intake 740 ml   Output --   Net 740 ml       Last 3 Weights   03/01/25 0555 172 lb 9.6 oz (78.3 kg)   02/28/25 0531 176 lb 6.4 oz (80 kg)   02/27/25 2103 174 lb 14.4 oz (79.3 kg)   02/27/25 1252 173 lb (78.5 kg)   09/14/24 0557 182 lb 5.1 oz (82.7 kg)   09/09/24 1125 178 lb (80.7 kg)   07/22/24 1424 179 lb (81.2 kg)       Tele: NSR    Physical Exam:    General: Alert and oriented x 3. No apparent distress. No respiratory or constitutional distress.  HEENT: Normocephalic, anicteric sclera, neck supple, no thyromegaly or adenopathy.  Neck: No JVD, carotids 2+, no bruits.  Cardiac: Regular rate and rhythm. S1, S2 normal. No murmur, pericardial rub, S3, thrill, heave or extra cardiac sounds.  Lungs: Clear without wheezes, rales, rhonchi or dullness.  Normal excursions and effort.  Abdomen: Soft, non-tender. No organosplenomegally, mass or rebound, BS-present.  Extremities: Without clubbing, cyanosis or edema.  Peripheral pulses are 2+.  Neurologic: Alert and oriented, normal affect. No motor or coordinational deficit.  Skin: Warm and dry.     Laboratory/Data:    Labs:         Recent Labs   Lab 02/27/25  0535 02/27/25  1255 02/28/25  0540 03/01/25  0541   WBC 7.5 7.9 6.6 8.1   HGB 7.0* 7.5* 8.3* 8.6*   MCV 87.6 87.2 86.0 85.4   .0 277.0 226.0 224.0       Recent Labs   Lab 02/27/25  0535 02/27/25  1255 02/28/25  0540 03/01/25  0541    137 138 140   K 4.8 4.5 4.2 4.3    100 104 104   CO2 30.0 30.0 27.0 29.0   BUN 31* 31* 25* 22   CREATSERUM 1.15* 1.23* 0.93 1.08*   CA 10.2 10.3 10.0 9.8   MG  --   --  1.3* 1.9   * 102*  100* 110*       Recent Labs   Lab 02/27/25  0535 02/27/25  1255 02/28/25  0540   ALT <7* <7* <7*   AST 14 15 15   ALB 4.1 4.7 4.1   LDH  --   --  154       No results for input(s): \"TROP\" in the last 168 hours.    Allergies:   Allergies[1]    Medications:  Current Facility-Administered Medications   Medication Dose Route Frequency    furosemide (Lasix) tab 40 mg  40 mg Oral Daily    sacubitril-valsartan (Entresto) 24-26 MG per tab 1 tablet  1 tablet Oral BID    spironolactone (Aldactone) tab 25 mg  25 mg Oral Daily    glucose (Dex4) 15 GM/59ML oral liquid 15 g  15 g Oral Q15 Min PRN    Or    glucose (Glutose) 40% oral gel 15 g  15 g Oral Q15 Min PRN    Or    glucose-vitamin C (Dex-4) chewable tab 4 tablet  4 tablet Oral Q15 Min PRN    Or    dextrose 50% injection 50 mL  50 mL Intravenous Q15 Min PRN    Or    glucose (Dex4) 15 GM/59ML oral liquid 30 g  30 g Oral Q15 Min PRN    Or    glucose (Glutose) 40% oral gel 30 g  30 g Oral Q15 Min PRN    Or    glucose-vitamin C (Dex-4) chewable tab 8 tablet  8 tablet Oral Q15 Min PRN    insulin aspart (NovoLOG) 100 Units/mL FlexPen 1-5 Units  1-5 Units Subcutaneous TID AC and HS    acetaminophen (Tylenol Extra Strength) tab 500 mg  500 mg Oral Q4H PRN    lactated ringers infusion   Intravenous Continuous    famotidine (Pepcid) tab 20 mg  20 mg Oral Daily    ipratropium-albuterol (Duoneb) 0.5-2.5 (3) MG/3ML inhalation solution 3 mL  3 mL Nebulization Q6H PRN    aspirin DR tab 81 mg  81 mg Oral Daily    carbidopa-levodopa (SINEMET)  MG per tab 1 tablet  1 tablet Oral 3 times per day    clopidogrel (Plavix) tab 75 mg  75 mg Oral Daily    fluticasone-salmeterol (Advair Diskus) 250-50 MCG/ACT inhaler 1 puff  1 puff Inhalation BID    folic acid (Folvite) tab 2 mg  2 mg Oral Daily    gabapentin (Neurontin) cap 300 mg  300 mg Oral BID    metoprolol succinate ER (Toprol XL) 24 hr tab 100 mg  100 mg Oral BID    prednisoLONE (Pred Forte) 1 % ophthalmic suspension 1 drop  1 drop Left  Eye Daily    sertraline (Zoloft) tab 50 mg  50 mg Oral Daily    atorvastatin (Lipitor) tab 10 mg  10 mg Oral Nightly    melatonin cap/tab 5 mg  5 mg Oral Nightly PRN       Viral George MD  3/1/2025  1:03 PM         [1]   Allergies  Allergen Reactions    Naproxen OTHER (SEE COMMENTS)     headaches    Adhesive Tape RASH     States makes skin \"raw\"    Chocolate RASH    Strawberries RASH

## 2025-03-04 ENCOUNTER — LAB REQUISITION (OUTPATIENT)
Dept: LAB | Facility: HOSPITAL | Age: 85
End: 2025-03-04
Payer: MEDICARE

## 2025-03-04 DIAGNOSIS — I48.91 UNSPECIFIED ATRIAL FIBRILLATION (HCC): ICD-10-CM

## 2025-03-04 DIAGNOSIS — D64.9 ANEMIA, UNSPECIFIED: ICD-10-CM

## 2025-03-04 DIAGNOSIS — I10 ESSENTIAL (PRIMARY) HYPERTENSION: ICD-10-CM

## 2025-03-04 LAB
ALBUMIN SERPL-MCNC: 4.7 G/DL (ref 3.2–4.8)
ALBUMIN/GLOB SERPL: 1.5 {RATIO} (ref 1–2)
ALP LIVER SERPL-CCNC: 102 U/L
ALT SERPL-CCNC: 11 U/L
ANION GAP SERPL CALC-SCNC: 9 MMOL/L (ref 0–18)
AST SERPL-CCNC: 19 U/L (ref ?–34)
BASOPHILS # BLD AUTO: 0.06 X10(3) UL (ref 0–0.2)
BASOPHILS NFR BLD AUTO: 0.8 %
BILIRUB SERPL-MCNC: 0.2 MG/DL (ref 0.2–1.1)
BUN BLD-MCNC: 26 MG/DL (ref 9–23)
CALCIUM BLD-MCNC: 10 MG/DL (ref 8.7–10.6)
CHLORIDE SERPL-SCNC: 100 MMOL/L (ref 98–112)
CO2 SERPL-SCNC: 30 MMOL/L (ref 21–32)
CREAT BLD-MCNC: 1.02 MG/DL
EGFRCR SERPLBLD CKD-EPI 2021: 54 ML/MIN/1.73M2 (ref 60–?)
EOSINOPHIL # BLD AUTO: 0.36 X10(3) UL (ref 0–0.7)
EOSINOPHIL NFR BLD AUTO: 4.6 %
ERYTHROCYTE [DISTWIDTH] IN BLOOD BY AUTOMATED COUNT: 15.5 %
FASTING STATUS PATIENT QL REPORTED: YES
GLOBULIN PLAS-MCNC: 3.2 G/DL (ref 2–3.5)
GLUCOSE BLD-MCNC: 120 MG/DL (ref 70–99)
HCT VFR BLD AUTO: 31.5 %
HGB BLD-MCNC: 9.3 G/DL
IMM GRANULOCYTES # BLD AUTO: 0.02 X10(3) UL (ref 0–1)
IMM GRANULOCYTES NFR BLD: 0.3 %
LYMPHOCYTES # BLD AUTO: 2.9 X10(3) UL (ref 1–4)
LYMPHOCYTES NFR BLD AUTO: 37 %
MCH RBC QN AUTO: 26.4 PG (ref 26–34)
MCHC RBC AUTO-ENTMCNC: 29.5 G/DL (ref 31–37)
MCV RBC AUTO: 89.5 FL
MONOCYTES # BLD AUTO: 0.77 X10(3) UL (ref 0.1–1)
MONOCYTES NFR BLD AUTO: 9.8 %
NEUTROPHILS # BLD AUTO: 3.72 X10 (3) UL (ref 1.5–7.7)
NEUTROPHILS # BLD AUTO: 3.72 X10(3) UL (ref 1.5–7.7)
NEUTROPHILS NFR BLD AUTO: 47.5 %
OSMOLALITY SERPL CALC.SUM OF ELEC: 294 MOSM/KG (ref 275–295)
PLATELET # BLD AUTO: 266 10(3)UL (ref 150–450)
POTASSIUM SERPL-SCNC: 4.4 MMOL/L (ref 3.5–5.1)
PREALBUMIN: 27.5 MG/DL
PREALBUMIN: 27.5 MG/DL
PROT SERPL-MCNC: 7.9 G/DL (ref 5.7–8.2)
RBC # BLD AUTO: 3.52 X10(6)UL
SODIUM SERPL-SCNC: 139 MMOL/L (ref 136–145)
WBC # BLD AUTO: 7.8 X10(3) UL (ref 4–11)

## 2025-03-04 PROCEDURE — 85025 COMPLETE CBC W/AUTO DIFF WBC: CPT | Performed by: INTERNAL MEDICINE

## 2025-03-04 PROCEDURE — 84134 ASSAY OF PREALBUMIN: CPT | Performed by: INTERNAL MEDICINE

## 2025-03-04 PROCEDURE — 80053 COMPREHEN METABOLIC PANEL: CPT | Performed by: INTERNAL MEDICINE

## 2025-03-06 ENCOUNTER — LAB REQUISITION (OUTPATIENT)
Dept: LAB | Facility: HOSPITAL | Age: 85
End: 2025-03-06
Payer: MEDICARE

## 2025-03-06 DIAGNOSIS — I50.9 HEART FAILURE, UNSPECIFIED (HCC): ICD-10-CM

## 2025-03-06 DIAGNOSIS — I48.91 UNSPECIFIED ATRIAL FIBRILLATION (HCC): ICD-10-CM

## 2025-03-06 DIAGNOSIS — I10 ESSENTIAL (PRIMARY) HYPERTENSION: ICD-10-CM

## 2025-03-06 LAB
ALBUMIN SERPL-MCNC: 4.2 G/DL (ref 3.2–4.8)
ALBUMIN/GLOB SERPL: 1.4 {RATIO} (ref 1–2)
ALP LIVER SERPL-CCNC: 73 U/L
ALT SERPL-CCNC: <7 U/L
ANION GAP SERPL CALC-SCNC: 7 MMOL/L (ref 0–18)
AST SERPL-CCNC: 16 U/L (ref ?–34)
BASOPHILS # BLD AUTO: 0.05 X10(3) UL (ref 0–0.2)
BASOPHILS NFR BLD AUTO: 0.6 %
BILIRUB SERPL-MCNC: 0.3 MG/DL (ref 0.2–1.1)
BUN BLD-MCNC: 30 MG/DL (ref 9–23)
CALCIUM BLD-MCNC: 9.4 MG/DL (ref 8.7–10.6)
CHLORIDE SERPL-SCNC: 104 MMOL/L (ref 98–112)
CO2 SERPL-SCNC: 28 MMOL/L (ref 21–32)
CREAT BLD-MCNC: 1.02 MG/DL
EGFRCR SERPLBLD CKD-EPI 2021: 54 ML/MIN/1.73M2 (ref 60–?)
EOSINOPHIL # BLD AUTO: 0.47 X10(3) UL (ref 0–0.7)
EOSINOPHIL NFR BLD AUTO: 5.5 %
ERYTHROCYTE [DISTWIDTH] IN BLOOD BY AUTOMATED COUNT: 15.5 %
FASTING STATUS PATIENT QL REPORTED: YES
GLOBULIN PLAS-MCNC: 3 G/DL (ref 2–3.5)
GLUCOSE BLD-MCNC: 99 MG/DL (ref 70–99)
HCT VFR BLD AUTO: 28.1 %
HGB BLD-MCNC: 8.6 G/DL
IMM GRANULOCYTES # BLD AUTO: 0.01 X10(3) UL (ref 0–1)
IMM GRANULOCYTES NFR BLD: 0.1 %
LYMPHOCYTES # BLD AUTO: 3.82 X10(3) UL (ref 1–4)
LYMPHOCYTES NFR BLD AUTO: 44.9 %
MCH RBC QN AUTO: 26.3 PG (ref 26–34)
MCHC RBC AUTO-ENTMCNC: 30.6 G/DL (ref 31–37)
MCV RBC AUTO: 85.9 FL
MONOCYTES # BLD AUTO: 0.85 X10(3) UL (ref 0.1–1)
MONOCYTES NFR BLD AUTO: 10 %
NEUTROPHILS # BLD AUTO: 3.31 X10 (3) UL (ref 1.5–7.7)
NEUTROPHILS # BLD AUTO: 3.31 X10(3) UL (ref 1.5–7.7)
NEUTROPHILS NFR BLD AUTO: 38.9 %
OSMOLALITY SERPL CALC.SUM OF ELEC: 294 MOSM/KG (ref 275–295)
PLATELET # BLD AUTO: 255 10(3)UL (ref 150–450)
POTASSIUM SERPL-SCNC: 4.3 MMOL/L (ref 3.5–5.1)
PROT SERPL-MCNC: 7.2 G/DL (ref 5.7–8.2)
RBC # BLD AUTO: 3.27 X10(6)UL
SODIUM SERPL-SCNC: 139 MMOL/L (ref 136–145)
WBC # BLD AUTO: 8.5 X10(3) UL (ref 4–11)

## 2025-03-06 PROCEDURE — 80053 COMPREHEN METABOLIC PANEL: CPT | Performed by: INTERNAL MEDICINE

## 2025-03-06 PROCEDURE — 85025 COMPLETE CBC W/AUTO DIFF WBC: CPT | Performed by: INTERNAL MEDICINE

## 2025-03-18 ENCOUNTER — HOSPITAL ENCOUNTER (EMERGENCY)
Facility: HOSPITAL | Age: 85
Discharge: HOME | End: 2025-03-18
Attending: EMERGENCY MEDICINE
Payer: MEDICARE

## 2025-03-18 ENCOUNTER — APPOINTMENT (OUTPATIENT)
Dept: GENERAL RADIOLOGY | Facility: HOSPITAL | Age: 85
End: 2025-03-18
Attending: EMERGENCY MEDICINE
Payer: MEDICARE

## 2025-03-18 VITALS
HEART RATE: 68 BPM | DIASTOLIC BLOOD PRESSURE: 87 MMHG | OXYGEN SATURATION: 100 % | BODY MASS INDEX: 30.91 KG/M2 | HEIGHT: 62 IN | TEMPERATURE: 98 F | WEIGHT: 168 LBS | RESPIRATION RATE: 16 BRPM | SYSTOLIC BLOOD PRESSURE: 159 MMHG

## 2025-03-18 DIAGNOSIS — S42.401A CLOSED FRACTURE OF RIGHT ELBOW, INITIAL ENCOUNTER: Primary | ICD-10-CM

## 2025-03-18 PROCEDURE — 99284 EMERGENCY DEPT VISIT MOD MDM: CPT

## 2025-03-18 PROCEDURE — 73060 X-RAY EXAM OF HUMERUS: CPT | Performed by: EMERGENCY MEDICINE

## 2025-03-18 PROCEDURE — 29105 APPLICATION LONG ARM SPLINT: CPT

## 2025-03-18 PROCEDURE — 73080 X-RAY EXAM OF ELBOW: CPT | Performed by: EMERGENCY MEDICINE

## 2025-03-18 RX ORDER — HYDROCODONE BITARTRATE AND ACETAMINOPHEN 5; 325 MG/1; MG/1
1-2 TABLET ORAL EVERY 6 HOURS PRN
Qty: 15 TABLET | Refills: 0 | Status: SHIPPED | OUTPATIENT
Start: 2025-03-18 | End: 2025-03-18

## 2025-03-18 RX ORDER — HYDROCODONE BITARTRATE AND ACETAMINOPHEN 5; 325 MG/1; MG/1
1 TABLET ORAL ONCE
Status: COMPLETED | OUTPATIENT
Start: 2025-03-18 | End: 2025-03-18

## 2025-03-18 RX ORDER — HYDROCODONE BITARTRATE AND ACETAMINOPHEN 5; 325 MG/1; MG/1
1-2 TABLET ORAL EVERY 6 HOURS PRN
Qty: 15 TABLET | Refills: 0 | Status: SHIPPED | OUTPATIENT
Start: 2025-03-18 | End: 2025-03-25

## 2025-03-18 RX ORDER — ACETAMINOPHEN 500 MG
500 TABLET ORAL ONCE
Status: COMPLETED | OUTPATIENT
Start: 2025-03-18 | End: 2025-03-18

## 2025-03-18 NOTE — ED PROVIDER NOTES
Patient Seen in: [facility] Emergency Department      History     Chief Complaint   Patient presents with    Fall     Stated Complaint: Fall    Subjective:   HPI      84-year-old woman here for evaluation of right elbow pain.  States last night was in her wheelchair, believes she may have fallen asleep with tumbled out of the wheelchai week she landed with her right arm under her and has right elbow pain.  Did not strike her head or sustain any other injury denies any neck pain, any other complaints or concerns.,  Objective:     Past Medical History:    Arrhythmia    C2 cervical fracture (HCC)    C5 vertebral fracture (HCC)    CVA (cerebral infarction)    x2    GERD (gastroesophageal reflux disease)    HTN (hypertension)    SHANA (obstructive sleep apnea)    Osteoarthritis    Overactive bladder    Parkinson's disease (HCC)    Pulmonary hypertension (HCC)    RA (rheumatoid arthritis) (HCC)    Type II or unspecified type diabetes mellitus without mention of complication, not stated as uncontrolled    Unspecified sleep apnea    AHI 78 RDI 80 SaO2 deirdre 85 % CPAP 11  Sleep RX/ now HME    Vitamin D deficiency    Vitamin D deficiency              Past Surgical History:   Procedure Laterality Date    Cholecystectomy      Hernia surgery      Hip replacement surgery Bilateral     Knee replacement surgery Right     Other surgical history      \"Veins removed\"    Removal of heel spur Right     Tonsillectomy                  Social History     Socioeconomic History    Marital status:    Tobacco Use    Smoking status: Never    Smokeless tobacco: Never   Vaping Use    Vaping status: Never Used   Substance and Sexual Activity    Alcohol use: No     Alcohol/week: 0.0 standard drinks of alcohol    Drug use: No   Social History Narrative     2014    5 kids    No tobacco, EtOH, drugs    Retired      Social Drivers of Health     Food Insecurity: No Food Insecurity (2/27/2025)    NCSS - Food Insecurity      Worried About Running Out of Food in the Last Year: No     Ran Out of Food in the Last Year: No   Transportation Needs: No Transportation Needs (2/27/2025)    NCSS - Transportation     Lack of Transportation: No   Housing Stability: Not At Risk (2/27/2025)    NCSS - Housing/Utilities     Has Housing: Yes     Worried About Losing Housing: No     Unable to Get Utilities: No                  Physical Exam     ED Triage Vitals [03/18/25 1034]   BP (!) 180/84   Pulse 69   Resp 20   Temp 98.2 °F (36.8 °C)   Temp src Oral   SpO2 98 %   O2 Device None (Room air)       Current Vitals:   Vital Signs  BP: 159/87  Pulse: 68  Resp: 16  Temp: 98.2 °F (36.8 °C)  Temp src: Oral  MAP (mmHg): (!) 109    Oxygen Therapy  SpO2: 100 %  O2 Device: None (Room air)        Physical Exam      Physical Exam  Vitals signs and nursing note reviewed.   General:  Patient laying supine in the bed in no acute distress  Head: Normocephalic and atraumatic.   Neck: No midline tenderness no pain with range of motion.  Musculoskeletal: Patient has pain at the right elbow with extension, there is no significant effusion over the right elbow skin is intact.  Radial pulses 2+, motor and sensory intact in radial ulnar median distribution of right hand no bony tenderness over the right wrist or right hand, no bony tenderness over the right shoulder or clavicle  HEENT:  Mucous membranes are moist.   Cardiovascular:  Normal rate and regular rhythm.  No Edema  Pulmonary:  Pulmonary effort is normal.  Normal breath sounds. No wheezing, rhonchi or rales.   Abdominal: Soft nontender nondistended, normal bowel sounds, no guarding no rebound tenderness  Skin: Warm and dry  Neurological: Awake alert, speech is normal        ED Course     Labs Reviewed   RAINBOW DRAW LAVENDER   RAINBOW DRAW LIGHT GREEN   RAINBOW DRAW BLUE            XR HUMERUS (MIN 2 VIEWS), RIGHT (CPT=73060)    Result Date: 3/18/2025  PROCEDURE:  XR HUMERUS (MIN 2 VIEWS), RIGHT(CPT=73060)   INDICATIONS:  Fall, pain  COMPARISON:  None.  PATIENT STATED HISTORY: (As transcribed by Technologist)  Patient states that she fell on her right side yesterday. Today, her arm hurts to the point of being immobile.    FINDINGS:  Severe diffuse osteopenia.  Possible fracture of the radial head and of the olecranon are better appreciated on separately dictated elbow radiographs.  No definite evidence of dislocation.            CONCLUSION:  See above.   LOCATION:  [facility]   Dictated by (CST): Billy Newton MD on 3/18/2025 at 11:57 AM     Finalized by (CST): Billy Newton MD on 3/18/2025 at 11:58 AM       XR ELBOW, COMPLETE (MIN 3 VIEWS), RIGHT (CPT=73080)    Result Date: 3/18/2025  PROCEDURE:  XR ELBOW, COMPLETE (MIN 3 VIEWS), RIGHT (CPT=73080)  TECHNIQUE:  Three views were obtained.  COMPARISON:  None.  INDICATIONS:  Fall, pain  PATIENT STATED HISTORY: (As transcribed by Technologist)  Patient states that she fell on her right side yesterday. Today, her arm hurts to the point of being immobile.    FINDINGS:  Elbow joint effusion is present.  On the lateral view there may be a fracture present involving the coronary process of the ulna.  Possible additional fracture present involving the radial head.  Fixation with [id number] radiographs is recommended.            CONCLUSION:  See above.   LOCATION:  [facility]    Dictated by (CST): Billy Newton MD on 3/18/2025 at 11:56 AM     Finalized by (CST): Billy Newton MD on 3/18/2025 at 11:57 AM       CT BRAIN OR HEAD (CPT=70450)    Result Date: 3/1/2025  PROCEDURE:  CT BRAIN OR HEAD (22890)  COMPARISON:  [facility] , CT, CT BRAIN OR HEAD (29521), 7/22/2024, 4:55 PM.  INDICATIONS:  Facial droop, laterality not specified.  TECHNIQUE:  Noncontrast CT scanning is performed through the brain. Dose reduction techniques were used. Dose information is transmitted to the ACR (American College of Radiology) NRDR (National Radiology Data Registry) which includes the Dose Index Registry.  PATIENT STATED  HISTORY: (As transcribed by Technologist)  facial droop.     FINDINGS:   VENTRICLES/SULCI:   Ventricles and sulci are prominent indicating atrophy.  INTRACRANIAL:  There are no abnormal extraaxial fluid collections.  There is no midline shift.  There are no acute appearing intraparenchymal brain abnormalities.  There is nothing specific for acute territorial infarct.  There is no hemorrhage or mass lesion.  There is chronic microvascular ischemic white matter disease in the cerebrum bilaterally.  These changes are modest.  Small old right cerebellar infarct image 12 seen previously image 8 on the prior.  Old subinsular infarct image 23 present on image 20 from the prior exam.  SINUSES:  No acute sinusitis.   MASTOIDS:  The mastoids are clear.  SKULL:  No evidence for fracture or acute osseous abnormality.  OTHER:  None.            CONCLUSION:  Chronic changes in the brain, as described, but no acute intracranial bleed, or other acute intracranial process identified.  For new onset neurologic symptoms if further imaging needed, consider MRI follow-up.    LOCATION:  [facility]   Dictated by (CST): Sascha Fontenot MD on 3/01/2025 at 1:28 PM     Finalized by (CST): Sascha Fontenot MD on 3/01/2025 at 1:30 PM       XR CHEST AP PORTABLE  (CPT=71045)    Result Date: 2/28/2025  PROCEDURE:  XR CHEST AP PORTABLE  (CPT=71045)  TECHNIQUE:  AP chest radiograph was obtained.  COMPARISON:  [facility] , XR, XR CHEST AP PORTABLE  (CPT=71045), 2/27/2025, 6:12 PM.  INDICATIONS:  Cough and shortness of breath.  PATIENT STATED HISTORY: (As transcribed by Technologist)  Patient offered no additional history at this time.    FINDINGS:  Stable degree of cardiomegaly.  The lungs are clear.  Normal pulmonary vasculature.  No pleural disease.  No acute process or significant interval changes are noted.            CONCLUSION:  1. Able degree of cardiomegaly. 2. Lungs are clear without evidence of an acute process or significant interval changes.    LOCATION:  [facility]      Dictated by (CST): Jelani Caba DO on 2/28/2025 at 4:44 PM     Finalized by (CST): Jelani Caba DO on 2/28/2025 at 4:45 PM       CT SPINE CERVICAL (CPT=72125)    Result Date: 2/27/2025  PROCEDURE:  CT SPINE CERVICAL (CPT=72125)  COMPARISON:  [facility] , CT, CT SPINE CERVICAL (CPT=72125), 7/22/2024, 5:02 PM.  INDICATIONS:  Neck pain  TECHNIQUE:  Noncontrast CT scanning of the cervical spine is performed from the skull base through C7.  Multiplanar reconstructions are generated.  Dose reduction techniques were used. Dose information is transmitted to the ACR (American College of Radiology) NRDR (National Radiology Data Registry) which includes the Dose Index Registry.  PATIENT STATED HISTORY: (As transcribed by Technologist)  Neck pain. Cervical fusion.   FINDINGS:  No acute fracture or subluxation in the cervical spine.  Posterior cervical spinal fusion changes are again noted at C2-C6.  No evidence of hardware complication.  Beam hardening artifact in the surgical hole over limits evaluation of surrounding structures.  There are bony changes of ankylosing spondylitis noted.  Vertebral body heights are well-maintained.  Moderate degenerative changes at the C1-2 articulation.  Straightening of normal cervical lordosis with anatomic alignment.  Mild to moderate facet arthropathy throughout the cervical spine.   Evaluation of the spinal canal is limited due to the beam hardening artifact in the surgical hardware.  No obvious high-grade spinal canal stenosis identified.  No high-grade neural foraminal narrowing is seen.  The paraspinal soft tissues are grossly unremarkable.  Heterogenous thyroid gland.  Scattered vascular calcifications.  The partially imaged posterior fossa demonstrates an old right cerebellar infarct that is unchanged.  Scattered atelectasis/scarring in the partially imaged upper lungs.  Stable noncalcified pulmonary nodules in the medial left upper lobe measuring up to 4 mm on  image 129 series 301.             CONCLUSION:   1. No acute fracture or subluxation in the cervical spine.  Degenerative and postoperative changes as above.  2. Stable noncalcified pulmonary nodules in the partially imaged left upper lobe measuring up to 4 mm as above. Followup as per Fleischner criteria is suggested.   Please see above for further details.   LOCATION:  [facility]   Dictated by (CST): Adilson Pacheco MD on 2/27/2025 at 11:46 PM     Finalized by (CST): Adilson Pacheco MD on 2/27/2025 at 11:49 PM       XR CHEST AP PORTABLE  (CPT=71045)    Result Date: 2/27/2025  PROCEDURE:  XR CHEST AP PORTABLE  (CPT=71045)  TECHNIQUE:  AP chest radiograph was obtained.  COMPARISON:  [facility] , XR, XR CHEST AP PORTABLE  (CPT=71045), 6/14/2024, 12:12 PM.  INDICATIONS:  hgb 7, possible GI bleed, neck pain, + thyroid nodules, IV in place, VS stable  PATIENT STATED HISTORY: (As transcribed by Technologist)  Patient offered no additional history at this time    FINDINGS:  Cardiomegaly with normal pulmonary vascularity. No pleural effusion or pneumothorax. No lobar consolidation.  Mild atelectasis/scarring in the lower lungs.  Partially imaged cervical fusion hardware.  Degenerative changes the spine.            CONCLUSION:  No lobar pneumonia or overt congestive failure.  Mild atelectasis/scarring in the lower lungs.   LOCATION:  [facility]      Dictated by (CST): Adilson Pacheco MD on 2/27/2025 at 6:25 PM     Finalized by (CST): Adilson Pacheco MD on 2/27/2025 at 6:26 PM             MDM      84-year-old woman here for evaluation of right elbow pain after fall.  Differential includes contusion versus fracture.  Possible nondisplaced fracture on the x-ray, patient does have significant pain with range of motion, was placed in a long-arm splint will be discharged home to follow-up with orthopedics for reevaluation next week.  Prescription for Norco provided, return precautions discussed with patient and son at bedside they are in  agreement with plan.      I independently viewed and interpreted the following imaging: X-ray of the right elbow shows no obvious acute displaced fracture        Medical Decision Making      Disposition and Plan     Clinical Impression:  1. Closed fracture of right elbow, initial encounter         Disposition:  Discharge  3/18/2025  1:03 pm    Follow-up:  Compa Villareal MD  [address]  [address]  [facility] [id number] [phone]    Schedule an appointment as soon as possible for a visit in 1 day(s)      Norm Hui MD  [address] [facility] [id number] [phone]    Follow up  Follow-up with your PMD for reevaluation in 24 to 48 hours.  Return to ER if symptoms worsen or change or if any other new concerns.          Medications Prescribed:  Discharge Medication List as of 3/18/2025  1:14 PM        START taking these medications    Details   HYDROcodone-acetaminophen 5-325 MG Oral Tab Take 1-2 tablets by mouth every 6 (six) hours as needed for Pain (do not take this medication prior to drinking alcohol or driving as this medication can impair your senses.). Do not drink alcohol or drive while taking this medication as it can impair yo ur senses., Normal, Disp-15 tablet, R-0                 Supplementary Documentation:

## 2025-03-18 NOTE — ED INITIAL ASSESSMENT (HPI)
Pt in via EMS from [facility]. Pt had a mechanical fall out of the wheelchair yesterday. No LOC. A&Ox4. Aspirin, no other thinners. Pt woke up with pain in her right elbow today 7/10.

## 2025-04-03 ENCOUNTER — LAB REQUISITION (OUTPATIENT)
Dept: LAB | Facility: HOSPITAL | Age: 85
End: 2025-04-03
Payer: MEDICARE

## 2025-04-03 DIAGNOSIS — E11.21 TYPE 2 DIABETES MELLITUS WITH DIABETIC NEPHROPATHY (HCC): ICD-10-CM

## 2025-04-03 DIAGNOSIS — I48.91 UNSPECIFIED ATRIAL FIBRILLATION (HCC): ICD-10-CM

## 2025-04-03 DIAGNOSIS — I10 ESSENTIAL (PRIMARY) HYPERTENSION: ICD-10-CM

## 2025-04-03 LAB
ALBUMIN SERPL-MCNC: 4.4 G/DL (ref 3.2–4.8)
ALBUMIN/GLOB SERPL: 1.4 {RATIO} (ref 1–2)
ALP LIVER SERPL-CCNC: 81 U/L
ALT SERPL-CCNC: <7 U/L
ANION GAP SERPL CALC-SCNC: 10 MMOL/L (ref 0–18)
AST SERPL-CCNC: 15 U/L (ref ?–34)
BASOPHILS # BLD AUTO: 0.04 X10(3) UL (ref 0–0.2)
BASOPHILS NFR BLD AUTO: 0.6 %
BILIRUB SERPL-MCNC: 0.2 MG/DL (ref 0.2–1.1)
BUN BLD-MCNC: 25 MG/DL (ref 9–23)
CALCIUM BLD-MCNC: 10.1 MG/DL (ref 8.7–10.6)
CHLORIDE SERPL-SCNC: 101 MMOL/L (ref 98–112)
CO2 SERPL-SCNC: 28 MMOL/L (ref 21–32)
CREAT BLD-MCNC: 1.06 MG/DL
EGFRCR SERPLBLD CKD-EPI 2021: 51 ML/MIN/1.73M2 (ref 60–?)
EOSINOPHIL # BLD AUTO: 0.14 X10(3) UL (ref 0–0.7)
EOSINOPHIL NFR BLD AUTO: 1.9 %
ERYTHROCYTE [DISTWIDTH] IN BLOOD BY AUTOMATED COUNT: 17.8 %
FASTING STATUS PATIENT QL REPORTED: YES
GLOBULIN PLAS-MCNC: 3.1 G/DL (ref 2–3.5)
GLUCOSE BLD-MCNC: 104 MG/DL (ref 70–99)
HCT VFR BLD AUTO: 31.9 %
HGB BLD-MCNC: 9.7 G/DL
IMM GRANULOCYTES # BLD AUTO: 0.01 X10(3) UL (ref 0–1)
IMM GRANULOCYTES NFR BLD: 0.1 %
LYMPHOCYTES # BLD AUTO: 3.42 X10(3) UL (ref 1–4)
LYMPHOCYTES NFR BLD AUTO: 47.6 %
MCH RBC QN AUTO: 26.9 PG (ref 26–34)
MCHC RBC AUTO-ENTMCNC: 30.4 G/DL (ref 31–37)
MCV RBC AUTO: 88.4 FL
MONOCYTES # BLD AUTO: 0.68 X10(3) UL (ref 0.1–1)
MONOCYTES NFR BLD AUTO: 9.5 %
NEUTROPHILS # BLD AUTO: 2.9 X10 (3) UL (ref 1.5–7.7)
NEUTROPHILS # BLD AUTO: 2.9 X10(3) UL (ref 1.5–7.7)
NEUTROPHILS NFR BLD AUTO: 40.3 %
OSMOLALITY SERPL CALC.SUM OF ELEC: 293 MOSM/KG (ref 275–295)
PLATELET # BLD AUTO: 317 10(3)UL (ref 150–450)
POTASSIUM SERPL-SCNC: 3.9 MMOL/L (ref 3.5–5.1)
PROT SERPL-MCNC: 7.5 G/DL (ref 5.7–8.2)
RBC # BLD AUTO: 3.61 X10(6)UL
SODIUM SERPL-SCNC: 139 MMOL/L (ref 136–145)
WBC # BLD AUTO: 7.2 X10(3) UL (ref 4–11)

## 2025-04-03 PROCEDURE — 80053 COMPREHEN METABOLIC PANEL: CPT | Performed by: INTERNAL MEDICINE

## 2025-04-03 PROCEDURE — 85025 COMPLETE CBC W/AUTO DIFF WBC: CPT | Performed by: INTERNAL MEDICINE

## 2025-04-17 ENCOUNTER — LAB REQUISITION (OUTPATIENT)
Dept: LAB | Facility: HOSPITAL | Age: 85
End: 2025-04-17
Payer: MEDICARE

## 2025-04-17 DIAGNOSIS — R05.9 COUGH, UNSPECIFIED: ICD-10-CM

## 2025-04-17 DIAGNOSIS — D64.9 ANEMIA, UNSPECIFIED: ICD-10-CM

## 2025-04-17 LAB
ALBUMIN SERPL-MCNC: 4.6 G/DL (ref 3.2–4.8)
ALBUMIN/GLOB SERPL: 1.5 {RATIO} (ref 1–2)
ALP LIVER SERPL-CCNC: 83 U/L (ref 55–142)
ALT SERPL-CCNC: <7 U/L (ref 10–49)
ANION GAP SERPL CALC-SCNC: 12 MMOL/L (ref 0–18)
AST SERPL-CCNC: 21 U/L (ref ?–34)
BASOPHILS # BLD AUTO: 0.02 X10(3) UL (ref 0–0.2)
BASOPHILS NFR BLD AUTO: 0.5 %
BILIRUB SERPL-MCNC: 0.2 MG/DL (ref 0.2–1.1)
BUN BLD-MCNC: 26 MG/DL (ref 9–23)
CALCIUM BLD-MCNC: 10.1 MG/DL (ref 8.7–10.6)
CHLORIDE SERPL-SCNC: 102 MMOL/L (ref 98–112)
CO2 SERPL-SCNC: 25 MMOL/L (ref 21–32)
CREAT BLD-MCNC: 0.97 MG/DL (ref 0.55–1.02)
EGFRCR SERPLBLD CKD-EPI 2021: 57 ML/MIN/1.73M2 (ref 60–?)
EOSINOPHIL # BLD AUTO: 0 X10(3) UL (ref 0–0.7)
EOSINOPHIL NFR BLD AUTO: 0 %
ERYTHROCYTE [DISTWIDTH] IN BLOOD BY AUTOMATED COUNT: 18.1 %
FASTING STATUS PATIENT QL REPORTED: YES
GLOBULIN PLAS-MCNC: 3 G/DL (ref 2–3.5)
GLUCOSE BLD-MCNC: 130 MG/DL (ref 70–99)
HCT VFR BLD AUTO: 30.4 % (ref 35–48)
HGB BLD-MCNC: 9.5 G/DL (ref 12–16)
IMM GRANULOCYTES # BLD AUTO: 0.01 X10(3) UL (ref 0–1)
IMM GRANULOCYTES NFR BLD: 0.2 %
LYMPHOCYTES # BLD AUTO: 1.08 X10(3) UL (ref 1–4)
LYMPHOCYTES NFR BLD AUTO: 24.7 %
MCH RBC QN AUTO: 27.5 PG (ref 26–34)
MCHC RBC AUTO-ENTMCNC: 31.3 G/DL (ref 31–37)
MCV RBC AUTO: 87.9 FL (ref 80–100)
MONOCYTES # BLD AUTO: 0.61 X10(3) UL (ref 0.1–1)
MONOCYTES NFR BLD AUTO: 14 %
NEUTROPHILS # BLD AUTO: 2.65 X10 (3) UL (ref 1.5–7.7)
NEUTROPHILS # BLD AUTO: 2.65 X10(3) UL (ref 1.5–7.7)
NEUTROPHILS NFR BLD AUTO: 60.6 %
OSMOLALITY SERPL CALC.SUM OF ELEC: 295 MOSM/KG (ref 275–295)
PLATELET # BLD AUTO: 204 10(3)UL (ref 150–450)
POTASSIUM SERPL-SCNC: 4.8 MMOL/L (ref 3.5–5.1)
PROT SERPL-MCNC: 7.6 G/DL (ref 5.7–8.2)
RBC # BLD AUTO: 3.46 X10(6)UL (ref 3.8–5.3)
SODIUM SERPL-SCNC: 139 MMOL/L (ref 136–145)
WBC # BLD AUTO: 4.4 X10(3) UL (ref 4–11)

## 2025-04-17 PROCEDURE — 80053 COMPREHEN METABOLIC PANEL: CPT | Performed by: INTERNAL MEDICINE

## 2025-04-17 PROCEDURE — 85025 COMPLETE CBC W/AUTO DIFF WBC: CPT | Performed by: INTERNAL MEDICINE

## 2025-05-01 ENCOUNTER — LAB ENCOUNTER (OUTPATIENT)
Dept: LAB | Age: 85
End: 2025-05-01
Attending: INTERNAL MEDICINE
Payer: MEDICARE

## 2025-06-03 ENCOUNTER — LAB ENCOUNTER (OUTPATIENT)
Dept: LAB | Age: 85
End: 2025-06-03
Attending: INTERNAL MEDICINE
Payer: MEDICARE

## 2025-06-09 ENCOUNTER — APPOINTMENT (OUTPATIENT)
Dept: CT IMAGING | Facility: HOSPITAL | Age: 85
End: 2025-06-09
Attending: EMERGENCY MEDICINE
Payer: MEDICARE

## 2025-06-09 ENCOUNTER — HOSPITAL ENCOUNTER (EMERGENCY)
Facility: HOSPITAL | Age: 85
Discharge: HOME | End: 2025-06-09
Attending: EMERGENCY MEDICINE
Payer: MEDICARE

## 2025-06-09 VITALS
HEART RATE: 64 BPM | RESPIRATION RATE: 14 BRPM | OXYGEN SATURATION: 95 % | SYSTOLIC BLOOD PRESSURE: 182 MMHG | DIASTOLIC BLOOD PRESSURE: 68 MMHG | TEMPERATURE: 98 F

## 2025-06-09 DIAGNOSIS — I10 HYPERTENSION, POOR CONTROL: Primary | ICD-10-CM

## 2025-06-09 LAB
ALBUMIN SERPL-MCNC: 4.4 G/DL (ref 3.2–4.8)
ALBUMIN/GLOB SERPL: 1.4 {RATIO} (ref 1–2)
ALP LIVER SERPL-CCNC: 87 U/L (ref 55–142)
ALT SERPL-CCNC: <7 U/L (ref 10–49)
ANION GAP SERPL CALC-SCNC: 9 MMOL/L (ref 0–18)
APTT PPP: 28.6 SECONDS (ref 23–36)
AST SERPL-CCNC: 19 U/L (ref ?–34)
ATRIAL RATE: 69 BPM
BASOPHILS # BLD AUTO: 0.04 X10(3) UL (ref 0–0.2)
BASOPHILS NFR BLD AUTO: 0.6 %
BILIRUB SERPL-MCNC: 0.3 MG/DL (ref 0.2–1.1)
BUN BLD-MCNC: 18 MG/DL (ref 9–23)
CALCIUM BLD-MCNC: 10 MG/DL (ref 8.7–10.6)
CHLORIDE SERPL-SCNC: 102 MMOL/L (ref 98–112)
CO2 SERPL-SCNC: 28 MMOL/L (ref 21–32)
CREAT BLD-MCNC: 0.96 MG/DL (ref 0.55–1.02)
EGFRCR SERPLBLD CKD-EPI 2021: 58 ML/MIN/1.73M2 (ref 60–?)
EOSINOPHIL # BLD AUTO: 0.18 X10(3) UL (ref 0–0.7)
EOSINOPHIL NFR BLD AUTO: 2.5 %
ERYTHROCYTE [DISTWIDTH] IN BLOOD BY AUTOMATED COUNT: 17.2 %
GLOBULIN PLAS-MCNC: 3.1 G/DL (ref 2–3.5)
GLUCOSE BLD-MCNC: 177 MG/DL (ref 70–99)
HCT VFR BLD AUTO: 34 % (ref 35–48)
HGB BLD-MCNC: 11 G/DL (ref 12–16)
IMM GRANULOCYTES # BLD AUTO: 0.03 X10(3) UL (ref 0–1)
IMM GRANULOCYTES NFR BLD: 0.4 %
INR BLD: 1.07 (ref 0.8–1.2)
LYMPHOCYTES # BLD AUTO: 2.6 X10(3) UL (ref 1–4)
LYMPHOCYTES NFR BLD AUTO: 35.9 %
MCH RBC QN AUTO: 29.9 PG (ref 26–34)
MCHC RBC AUTO-ENTMCNC: 32.4 G/DL (ref 31–37)
MCV RBC AUTO: 92.4 FL (ref 80–100)
MONOCYTES # BLD AUTO: 0.82 X10(3) UL (ref 0.1–1)
MONOCYTES NFR BLD AUTO: 11.3 %
NEUTROPHILS # BLD AUTO: 3.58 X10 (3) UL (ref 1.5–7.7)
NEUTROPHILS # BLD AUTO: 3.58 X10(3) UL (ref 1.5–7.7)
NEUTROPHILS NFR BLD AUTO: 49.3 %
OSMOLALITY SERPL CALC.SUM OF ELEC: 294 MOSM/KG (ref 275–295)
P AXIS: 70 DEGREES
P-R INTERVAL: 204 MS
PLATELET # BLD AUTO: 219 10(3)UL (ref 150–450)
POTASSIUM SERPL-SCNC: 3.8 MMOL/L (ref 3.5–5.1)
PROT SERPL-MCNC: 7.5 G/DL (ref 5.7–8.2)
PROTHROMBIN TIME: 14 SECONDS (ref 11.6–14.8)
Q-T INTERVAL: 402 MS
QRS DURATION: 74 MS
QTC CALCULATION (BEZET): 430 MS
R AXIS: -11 DEGREES
RBC # BLD AUTO: 3.68 X10(6)UL (ref 3.8–5.3)
SODIUM SERPL-SCNC: 139 MMOL/L (ref 136–145)
T AXIS: 13 DEGREES
TROPONIN I SERPL HS-MCNC: 13 NG/L (ref ?–34)
VENTRICULAR RATE: 69 BPM
WBC # BLD AUTO: 7.3 X10(3) UL (ref 4–11)

## 2025-06-09 PROCEDURE — 36415 COLL VENOUS BLD VENIPUNCTURE: CPT

## 2025-06-09 PROCEDURE — 99285 EMERGENCY DEPT VISIT HI MDM: CPT

## 2025-06-09 PROCEDURE — 93005 ELECTROCARDIOGRAM TRACING: CPT

## 2025-06-09 PROCEDURE — 85025 COMPLETE CBC W/AUTO DIFF WBC: CPT | Performed by: EMERGENCY MEDICINE

## 2025-06-09 PROCEDURE — 93010 ELECTROCARDIOGRAM REPORT: CPT

## 2025-06-09 PROCEDURE — 80053 COMPREHEN METABOLIC PANEL: CPT | Performed by: EMERGENCY MEDICINE

## 2025-06-09 PROCEDURE — 84484 ASSAY OF TROPONIN QUANT: CPT | Performed by: EMERGENCY MEDICINE

## 2025-06-09 PROCEDURE — 85730 THROMBOPLASTIN TIME PARTIAL: CPT | Performed by: EMERGENCY MEDICINE

## 2025-06-09 PROCEDURE — 85610 PROTHROMBIN TIME: CPT | Performed by: EMERGENCY MEDICINE

## 2025-06-09 PROCEDURE — 70450 CT HEAD/BRAIN W/O DYE: CPT | Performed by: EMERGENCY MEDICINE

## 2025-06-09 RX ORDER — SACUBITRIL AND VALSARTAN 49; 51 MG/1; MG/1
1 TABLET, FILM COATED ORAL 2 TIMES DAILY
Qty: 60 TABLET | Refills: 0 | Status: SHIPPED | OUTPATIENT
Start: 2025-06-09 | End: 2025-07-09

## 2025-06-09 NOTE — ED INITIAL ASSESSMENT (HPI)
Pt sent from [facility] for hypertension this am  197/96, 181/95  Pts only c/o is a headache that is onoing off and on due to neck fx and surgery 5yrs ago. A/o x4

## 2025-06-09 NOTE — ED PROVIDER NOTES
Patient Seen in: [facility] Emergency Department        History  Chief Complaint   Patient presents with    Headache     Stated Complaint: Headache    Subjective:   HPI            84-year-old female with a past medical history as below including hypertension, diabetes, RA, PAF, CHF, CVA, cervical vertebral fracture brought by EMS from nursing homes due to elevated blood pressure and headache.  Patient states blood pressures been elevated since Friday but it went up to 196 systolic this morning.  Patient states she has chronic pain goes from the left side of her neck to the back of her head after a C-spine fracture 5 days ago.  Patient states she is also having a pain on the back of the right side of her head.  She reports feeling a little weak generally.  Denies visual changes, nausea or vomiting.  Denies chest pain or shortness of breath.  She reports feeling occasional fluttering in her chest but denies at this time.  Denies any focal numbness or weakness of her extremities.  Denies any recent change in her hypertension medication which she has been taking regularly.        Objective:     Past Medical History:    Arrhythmia    C2 cervical fracture (HCC)    C5 vertebral fracture (HCC)    CVA (cerebral infarction)    x2    GERD (gastroesophageal reflux disease)    HTN (hypertension)    SHANA (obstructive sleep apnea)    Osteoarthritis    Overactive bladder    Parkinson's disease (HCC)    Pulmonary hypertension (HCC)    RA (rheumatoid arthritis) (HCC)    Type II or unspecified type diabetes mellitus without mention of complication, not stated as uncontrolled    Unspecified sleep apnea    AHI 78 RDI 80 SaO2 deirdre 85 % CPAP 11  Sleep RX/ now HME    Vitamin D deficiency    Vitamin D deficiency              Past Surgical History:   Procedure Laterality Date    Cholecystectomy      Hernia surgery      Hip replacement surgery Bilateral     Knee replacement surgery Right     Other surgical history      \"Veins removed\"     Removal of heel spur Right     Tonsillectomy                  Social History     Socioeconomic History    Marital status:    Tobacco Use    Smoking status: Never    Smokeless tobacco: Never   Vaping Use    Vaping status: Never Used   Substance and Sexual Activity    Alcohol use: No     Alcohol/week: 0.0 standard drinks of alcohol    Drug use: No   Social History Narrative     2014    5 kids    No tobacco, EtOH, drugs    Retired      Social Drivers of Health     Food Insecurity: No Food Insecurity (2/27/2025)    NCSS - Food Insecurity     Worried About Running Out of Food in the Last Year: No     Ran Out of Food in the Last Year: No   Transportation Needs: No Transportation Needs (2/27/2025)    NCSS - Transportation     Lack of Transportation: No   Housing Stability: Not At Risk (2/27/2025)    NCSS - Housing/Utilities     Has Housing: Yes     Worried About Losing Housing: No     Unable to Get Utilities: No                                Physical Exam    ED Triage Vitals [06/09/25 1225]   BP (!) 166/93   Pulse 67   Resp 18   Temp 97.6 °F (36.4 °C)   Temp src Oral   SpO2 96 %   O2 Device None (Room air)       Current Vitals:   Vital Signs  BP: (!) 183/91  Pulse: 61  Resp: 18  Temp: 97.6 °F (36.4 °C)  Temp src: Oral  MAP (mmHg): (!) 119    Oxygen Therapy  SpO2: 95 %  O2 Device: None (Room air)            Physical Exam  Vitals and nursing note reviewed.   Constitutional:       Appearance: She is well-developed.   HENT:      Head: Normocephalic and atraumatic.      Mouth/Throat:      Mouth: Mucous membranes are moist.   Eyes:      General: No scleral icterus.     Extraocular Movements: Extraocular movements intact.      Conjunctiva/sclera: Conjunctivae normal.      Pupils: Pupils are equal, round, and reactive to light.   Neck:      Comments: Tenderness mild tenderness bilateral paraspinal muscles extending to the occipital area   Cardiovascular:      Rate and Rhythm: Normal rate and regular  rhythm.   Pulmonary:      Effort: Pulmonary effort is normal.      Breath sounds: Normal breath sounds.   Musculoskeletal:      Cervical back: Neck supple. No rigidity.   Skin:     General: Skin is warm and dry.   Neurological:      General: No focal deficit present.      Mental Status: She is alert and oriented to person, place, and time.      Cranial Nerves: No cranial nerve deficit.      Motor: No weakness.   Psychiatric:         Mood and Affect: Mood normal.         Behavior: Behavior normal.                 ED Course  Labs Reviewed   COMP METABOLIC PANEL (14) - Abnormal; Notable for the following components:       Result Value    Glucose 177 (*)     eGFR-Cr 58 (*)     ALT <7 (*)     All other components within normal limits   CBC WITH DIFFERENTIAL WITH PLATELET - Abnormal; Notable for the following components:    RBC 3.68 (*)     HGB 11.0 (*)     HCT 34.0 (*)     All other components within normal limits   TROPONIN I HIGH SENSITIVITY - Normal   PROTHROMBIN TIME (PT) - Normal   PTT, ACTIVATED - Normal   RAINBOW DRAW LAVENDER   RAINBOW DRAW LIGHT GREEN   RAINBOW DRAW BLUE     EKG    Rate, intervals and axes as noted on EKG Report.  Rate: 69  Rhythm: Sinus Rhythm  Reading: Normal sinus rhythm, minimal criteria for LVH, septal Q waves, no ST or T wave changes         CT BRAIN OR HEAD (CPT=70450)  Result Date: 6/9/2025  PROCEDURE:  CT BRAIN OR HEAD (16400)  COMPARISON:  [facility] , CT, CT BRAIN OR HEAD (67246), 3/01/2025, 1:10 PM.  INDICATIONS:  Headache/HTN  TECHNIQUE:  Noncontrast CT scanning is performed through the brain. Dose reduction techniques were used. Dose information is transmitted to the ACR (American College of Radiology) NRDR (National Radiology Data Registry) which includes the Dose Index Registry.  PATIENT STATED HISTORY: (As transcribed by Technologist)  Patient presents with hypertension.   FINDINGS: No evidence of intracranial hemorrhage or extra-axial fluid collection. Lucencies in the deep  periventricular white matter are likely sequelae of chronic small vessel ischemic disease. The sulci. No mass effect. Visualized portions of paranasal sinuses are unremarkable. Visualized portions of the mastoid air cells are unremarkable. Visualized portions of the orbits are unremarkable. IMPRESSION: Mild global parenchymal volume loss.  Sequelae of chronic small vessel ischemic disease is noted. No evidence of intracranial hemorrhage or extra-axial fluid collection.    LOCATION:  [place] [address]   Dictated by (CST): Johnny Michael MD on 6/09/2025 at 4:01 PM     Finalized by (CST): Johnny Michael MD on 6/09/2025 at 4:02 PM                         MDM     84-year-old female with a past medical history as below including hypertension, diabetes, RA, PAF, CHF, CVA, cervical vertebral fracture brought by EMS from nursing homes due to elevated blood pressure and headache.      Differential includes but is not limited to poorly controlled essential hypertension, hypertensive urgency, unlikely hypertensive emergency.  Will check EKG, labs and imaging to evaluate for endorgan damage.    Labs show normal WBC, chronic anemia with hemoglobin 11, improved from 10.2 on 6/3/2025.  Electrolytes and renal function are normal.  High-sensitivity troponin is normal.    Independent interpretation of CT brain negative for bleed.  Radiology report reviewed as above.    Discussed with cardiology Dr. Chavarria who recommends increasing Entresto dose to 49/51 twice daily and follow-up in clinic.  Patient is comfortable with this plan.  Return precautions discussed      Medical Decision Making  Amount and/or Complexity of Data Reviewed  Labs: ordered. Decision-making details documented in ED Course.  Radiology: ordered and independent interpretation performed. Decision-making details documented in ED Course.  ECG/medicine tests: ordered and independent interpretation performed. Decision-making details documented in ED Course.  Discussion  of management or test interpretation with external provider(s): Cardiology    Risk  Prescription drug management.        Disposition and Plan     Clinical Impression:  1. Hypertension, poor control         Disposition:  Discharge  6/9/2025  4:40 pm    Follow-up:  Norm Hui MD  [address] [facility] [address] [phone]    Follow up      Gene Luther MD  [address]  [facility] [address] [phone]    Follow up            Medications Prescribed:  Current Discharge Medication List        START taking these medications    Details   sacubitril-valsartan (ENTRESTO) 49-51 MG Oral Tab Take 1 tablet by mouth 2 (two) times daily.  Qty: 60 tablet, Refills: 0                   Supplementary Documentation:

## 2025-06-12 ENCOUNTER — LAB ENCOUNTER (OUTPATIENT)
Dept: LAB | Age: 85
End: 2025-06-12
Attending: INTERNAL MEDICINE
Payer: MEDICARE

## (undated) DEVICE — 3M™ RED DOT™ MONITORING ELECTRODE WITH FOAM TAPE AND STICKY GEL, 50/BAG, 20/CASE, 72/PLT 2570: Brand: RED DOT™

## (undated) DEVICE — BITEBLOCK ENDOSCP 60FR MAXI STRP

## (undated) DEVICE — 1200CC GUARDIAN II: Brand: GUARDIAN

## (undated) DEVICE — KIT VLV 5 PC AIR H2O SUCT BX ENDOGATOR CONN

## (undated) DEVICE — V2 SPECIMEN COLLECTION MANIFOLD KIT: Brand: NEPTUNE

## (undated) DEVICE — 10FT COMBINED O2 DELIVERY/CO2 MONITORING. FILTER WITH MICROSTREAM TYPE LUER: Brand: DUAL ADULT NASAL CANNULA

## (undated) DEVICE — KIT CUSTOM ENDOPROCEDURE STERIS

## (undated) NOTE — ED AVS SNAPSHOT
Marita Grider   MRN: [id number]    Department:  [facility] Emergency Department   Date of Visit:  1/5/2019           Disclosure     Insurance plans vary and the physician(s) referred by the ER may not be covered by your plan.  Please contact your in tell this physician (or your personal doctor if your instructions are to return to your personal doctor) about any new or lasting problems. The primary care or specialist physician will see patients referred from the [facility] Emergency Department.  Salvadore Skiff

## (undated) NOTE — IP AVS SNAPSHOT
Patient Demographics     Address  [address] [address] Phone  [phone] (Home) *Preferred*  [phone] [facility]) E-mail Address  [email or web address]      Emergency Contact(s)     Name Relation Home Work MEDINA Oral MD Vivian         Aspirin Low Dose 81 MG Tbec  Generic drug: aspirin  Next dose due: Tonight at bedtime       TAKE 1 TABLET BY MOUTH TWICE DAILY   Bhargavi Manuel MD         BOTOX IJ      Inject as directed.  Every 3 months          carbidopa-levodopa 25-1 Tomorrow       Take 1 tablet (100 mg total) by mouth once daily. Merle Gonsales MD         metFORMIN 500 MG Tabs  Commonly known as: GLUCOPHAGE  Next dose due: With next meal       Take 1 tablet (500 mg total) by mouth 2 (two) times daily with meals.    R capsule (4 mg total) by mouth 3 (three) times daily.    Kimberley Calhoun MD                  [phone] - MAR ACTION REPORT  (last 24 hrs)    ** SITE UNKNOWN **     Order ID Medication Name Action Time Action Reason Comments    [id number] albuterol 108 (24 TTXG [id number] Insulin Aspart Pen (NOVOLOG) 100 UNIT/ML flexpen 1-5 Units 01/11/22 0522 Given      [id number] Insulin Aspart Pen (NOVOLOG) 100 UNIT/ML flexpen 1-5 Units 01/11/22 1504 Given            RIGHT LOWER ABDOMEN     Order ID Medication Name Action Time A [facility]) [facility]) Shukri Santos  S[address] [id number] 03/19/20 1441 - Present            Microbiology Results (All)     Procedure Component Value Units Date/Time    Blood Culture [7 SOB, productive cough. Yesterday had some vomiting. +diarrhea. Sammie Mock took cxr. Covid pending. Started her on abx. SOB/cough worsening today. Came to ED for eval. Found to be +COVID. Had covid booster 9/2021.        PMH  Past Medical History: Systems  Comprehensive ROS reviewed and negative except for what's stated above.        OBJECTIVE:  Pulse 69   Temp 97.9 °F (36.6 °C) (Tympanic)   Resp 20   Ht 5' 4\" (1.626 m)   Wt 195 lb 1.7 oz (88.5 kg)   SpO2 (!) 88%   BMI 33.49 kg/m²   PE:  Gen: alert ASSESSMENT / PLAN:     # COVID PNA, immunosuppressed (h/o seroneg RA)  - vaccinated + booster 9/2021  - no hypoxia  - CXR independently reviewed  - apprec ID recs  - PCT neg   - supportive care  - trend inflammatory markers    # DM2  - SSI with disease ([facility]    • Pulmonary hypertension (HCC)    • RA (rheumatoid arthritis) ([facility]    • Type II or unspecified type diabetes mellitus without mention of complication, not stated as uncontrolled    • Unspecified sleep apnea SPLIT 5-15-15    AHI 78 RDI 80 01/06/22  0616 01/08/22  1505   WBC 7.8 4.3   HGB 11.8* 11.7*   MCV 94.6 94.7   .0 186.0   NE 3.72 1.57   LYMABS 2.79 1.60   INR  --  1.05       Recent Labs   Lab 01/06/22  0616 01/08/22  1505    138   K 4.7 3.5    104   CO2 29.0 27.0 Care,  Hospitalist  Pager: [phone]                    Electronically signed by Brenda Jean MD on 1/8/2022 10:17 PM     H&P signed by Brenda Jean MD at 1/8/2022 10:12 PM  Version 1 of 3    Author: Brenda Jean MD Service: — Author Type: Physi ALL:    Naproxen                OTHER (SEE COMMENTS)    Comment:headaches  Adhesive Tape           RASH    Comment:States makes skin \"raw\"     HOME MEDS  No outpatient medications have been marked as taking for the 1/8/22 encounter [facility] COMPARISON:  [facility] , XR, XR CHEST AP PORTABLE  (CPT=71010), 3/14/2017, 11:49 AM.  INDICATIONS:  pola  PATIENT STATED HISTORY: (As transcribed by Technologist)  Patient offered no additional history at this time.     FINDINGS:  Cardiomegaly with normal pulmo Africa Conrad MD   Hosp Day # 0 PCP Hay Gay MD       Requested by Dr. Alexandra Velázquez    Reason for Consultation:  COVID-19    History of Present Illness:  Hellen Mejia is a a(n) 78year old female vaccinated with third dose (booster) Dionisio Field 9/9/21, resident of [facility].       Allergies:    Naproxen                OTHER (SEE COMMENTS)    Comment:headaches  Adhesive Tape           RASH    Comment:States makes skin \"raw\"    Medications:    Current Facility-Administered Medications:   •  albuterol 108 (90 Base) MCG/AC 3  methylPREDNISolone 4 MG Oral Tablet Therapy Pack, Use as directed for 6 days, Disp: 21 tablet, Rfl: 0  predniSONE 10 MG Oral Tab, Take 2 tabs daily x 4 days THEN 1 tab daily x 4 days THEN STOP, Disp: 12 tablet, Rfl: 0  furosemide 20 MG Oral Tab, Take 1 DIRECTED, Disp: 306 each, Rfl: 1  Blood Glucose Monitoring Suppl (ACCU-CHEK ONELIA SMARTVIEW) W/DEVICE Does not apply Kit, Use to test blood sugar., Disp: 1 kit, Rfl: 0  OnabotulinumtoxinA (BOTOX IJ), Inject as directed.  Every 3 months, Disp: , Rfl:   InFLIX Value Date    INR 1.05 01/08/2022    PTP 13.7 01/08/2022         Microbiologic Data:   No results found for this visit on 01/08/22.       Imaging:  CXR noted  Established Problem list:  Patient Active Problem List:     Seronegative rheumatoid arthritis ([facility] [address]. PM              Discharge Summary - D/C Summary      Discharge Summary signed by Jac Julien MD at 1/11/2022  4:15 PM  Version 1 of 1    Author: Jac Julien MD Service: Hospitalist Author Type: Physician    Filed: 1/11/2022  4:15 PM Date of Service: 1/ take these medications    folic acid 1 MG Tabs  Commonly known as: FOLVITE  TAKE 4 TABLETS(4 MG) BY MOUTH DAILY  What changed:   · how much to take  · how to take this  · when to take this     furosemide 20 MG Tabs  Commonly known as: LASIX  Take 1 tablet Soln  Commonly known as: ATROVENT  2 sprays by Nasal route 3 (three) times daily. losartan 100 MG Tabs  Commonly known as: COZAAR  Take 1 tablet (100 mg total) by mouth once daily. methotrexate 2.5 MG Tabs  TAKE 8 TABLETS BY MOUTH EVERY 7 DAYS. developing infiltrates not excluded. Clinical correlation recommended. No pleural effusion or pneumothorax. Degenerative changes in the spine and shoulders.             CONCLUSION:  Minimal atelectasis/scarring in the lower lungs with developing infiltra her up. .  Patient educated in need to be up to chair with assist to facilitate rerun home if not wanting rehab.   Patient continues to show balance and endurance below baseline and will benefit from continued skilled IP PT services to progress with mobili have...   Patient Difficulty: Turning over in bed (including adjusting bedclothes, sheets and blankets)?: A Little   Patient Difficulty: Sitting down on and standing up from a chair with arms (e.g., wheelchair, bedside commode, etc.): A Little   Patient Dif in chair;Needs met;Call light within reach;RN aware of session/findings; All patient questions and concerns addressed; Alarm set    Therapist PPE: N95 and surgical Mask, gloves, gown and goggles were worn.   Patient/Family PPE: Mask none           Physical Th this evaluation, patient's clinical presentation is stable and overall the evaluation complexity is considered low.     DISCHARGE RECOMMENDATIONS  PT Discharge Recommendations: Home with home health PT (supported living at [facility])    PLAN  PT Treatment extremity AROM shoulder flex to 70    Lower extremity ROM is within functional limits except for the following: L knee flex to 60 bilateral ankle df to neutral    Lower extremity strength is within functional limits Right Hip flexion  3+/5  Left Hip flexio assistance  Distance (ft): 5  Assistive Device:  (B HHA)  Pattern: Shuffle  Stairs:   (NT)    Skilled Therapy Provided     Bed Mobility:  Rolling: min a  Supine to sit: min a    Sit to supine: min a      Transfer Mobility:  Sit to stand: min a   Stand to si Botulinum Toxin Type A Per Unit, Im Inj  09/21/16     Botulinum Toxin Type A Per Unit, Im Inj  06/13/16     Botulinum Toxin Type A Per Unit, Im Inj  03/16/16     Botulinum Toxin Type A Per Unit, Im Inj  12/09/15     Botulinum Toxin Type A Per Unit, Im I

## (undated) NOTE — ED AVS SNAPSHOT
[facility] Emergency Department    [place]  [address] [place] [address]    Phone:  [phone]    Fax:  [address] [facility] [address]   MRN: [id number]    Department:  [facility] Emergency Department   Date of Visit:  3/14/20 IF THERE IS ANY CHANGE OR WORSENING OF YOUR CONDITION, CALL YOUR PRIMARY CARE PHYSICIAN AT ONCE OR RETURN IMMEDIATELY TO THE EMERGENCY DEPARTMENT.     If you have been prescribed any medication(s), please fill your prescription right away and begin taking t

## (undated) NOTE — LETTER
[address]  [address]  Authorization for Surgical Operation and Procedure     Date:___________                                                                                                         Time:__________  I hereby authorize Surgeon(s):  Pantera Sexton MD, my physician and his/her assistants (if applicable), which may include medical students, residents, and/or fellows, to perform the following surgical operation/ procedure and administer such anesthesia as may be determined necessary by my physician:  Operation/Procedure name (s) Procedure(s):  ESOPHAGOGASTRODUODENOSCOPY (EGD) on Sandi Linder   2.   I recognize that during the surgical operation/procedure, unforeseen conditions may necessitate additional or different procedures than those listed above.  I, therefore, further authorize and request that the above-named surgeon, assistants, or designees perform such procedures as are, in their judgment, necessary and desirable.    3.   My surgeon/physician has discussed prior to my surgery the potential benefits, risks and side effects of this procedure; the likelihood of achieving goals; and potential problems that might occur during recuperation.  They also discussed reasonable alternatives to the procedure, including risks, benefits, and side effects related to the alternatives and risks related to not receiving this procedure.  I have had all my questions answered and I acknowledge that no guarantee has been made as to the result that may be obtained.    4.   Should the need arise during my operation/procedure, which includes change of level of care prior to discharge, I also consent to the administration of blood and/or blood products.  Further, I understand that despite careful testing and screening of blood or blood products by collecting agencies, I may still be subject to ill effects as a result of receiving a blood transfusion and/or blood products.  The  following are some, but not all, of the potential risks that can occur: fever and allergic reactions, hemolytic reactions, transmission of diseases such as Hepatitis, AIDS and Cytomegalovirus (CMV) and fluid overload.  In the event that I wish to have an autologous transfusion of my own blood, or a directed donor transfusion, I will discuss this with my physician.  Check only if Refusing Blood or Blood Products  I understand refusal of blood or blood products as deemed necessary by my physician may have serious consequences to my condition to include possible death. I hereby assume responsibility for my refusal and release the hospital, its personnel, and my physicians from any responsibility for the consequences of my refusal.          o  Refuse      5.   I authorize the use of any specimen, organs, tissues, body parts or foreign objects that may be removed from my body during the operation/procedure for diagnosis, research or teaching purposes and their subsequent disposal by hospital authorities.  I also authorize the release of specimen test results and/or written reports to my treating physician on the hospital medical staff or other referring or consulting physicians involved in my care, at the discretion of the Pathologist or my treating physician.    6.   I consent to the photographing or videotaping of the operations or procedures to be performed, including appropriate portions of my body for medical, scientific, or educational purposes, provided my identity is not revealed by the pictures or by descriptive texts accompanying them.  If the procedure has been photographed/videotaped, the surgeon will obtain the original picture, image, videotape or CD.  The hospital will not be responsible for storage, release or maintenance of the picture, image, tape or CD.    7.   I consent to the presence of a  or observers in the operating room as deemed necessary by my physician or their designees.     8.   I recognize that in the event my procedure results in extended X-Ray/fluoroscopy time, I may develop a skin reaction.    9. If I have a Do Not Attempt Resuscitation (DNAR) order in place, that status will be suspended while in the operating room, procedural suite, and during the recovery period unless otherwise explicitly stated by me (or a person authorized to consent on my behalf). The surgeon or my attending physician will determine when the applicable recovery period ends for purposes of reinstating the DNAR order.  10. Patients having a sterilization procedure: I understand that if the procedure is successful the results will be permanent and it will therefore be impossible for me to inseminate, conceive, or bear children.  I also understand that the procedure is intended to result in sterility, although the result has not been guaranteed.   11. I acknowledge that my physician has explained sedation/analgesia administration to me including the risk and benefits I consent to the administration of sedation/analgesia as may be necessary or desirable in the judgment of my physician.    I CERTIFY THAT I HAVE READ AND FULLY UNDERSTAND THE ABOVE CONSENT TO OPERATION and/or OTHER PROCEDURE.    _________________________________________  __________________________________  Signature of Patient     Signature of Responsible Person         ___________________________________         Printed Name of Responsible Person           _________________________________                 Relationship to Patient  _________________________________________  ______________________________  Signature of Witness          Date  Time      Patient Name: Sandi Linder     : 1940                 Printed: 2025     Medical Record #: [id number]                     Page 1 [address]  [address]    Consent for Anesthesia    I, Sandi Linder agree to be  cared for by an anesthesiologist, who is specially trained to monitor me and give me medicine to put me to sleep or keep me comfortable during my procedure    I understand that my anesthesiologist is not an employee or agent of [facility] or [employer]. He or she works for [employer].    As the patient asking for anesthesia services, I agree to:  Allow the anesthesiologist (anesthesia doctor) to give me medicine and do additional procedures as necessary. Some examples are: Starting or using an “IV” to give me medicine, fluids or blood during my procedure, and having a breathing tube placed to help me breathe when I’m asleep (intubation). In the event that my heart stops working properly, I understand that my anesthesiologist will make every effort to sustain my life, unless otherwise directed by [facility] Do Not Resuscitate documents.  Tell my anesthesia doctor before my procedure:  If I am pregnant.  The last time that I ate or drank.  All of the medicines I take (including prescriptions, herbal supplements, and pills I can buy without a prescription (including street drugs/illegal medications). Failure to inform my anesthesiologist about these medicines may increase my risk of anesthetic complications.  If I am allergic to anything or have had a reaction to anesthesia before.  I understand how the anesthesia medicine will help me (benefits).  I understand that with any type of anesthesia medicine there are risks:  The most common risks are: nausea, vomiting, sore throat, muscle soreness, damage to my eyes, mouth, or teeth (from breathing tube placement).  Rare risks include: remembering what happened during my procedure, allergic reactions to medications, injury to my airway, heart, lungs, vision, nerves, or muscles and in extremely rare instances death.  My doctor has explained to me other choices available to me for my care (alternatives).  Pregnant Patients  (“epidural”):  I understand that the risks of having an epidural (medicine given into my back to help control pain during labor), include itching, low blood pressure, difficulty urinating, headache or slowing of the baby’s heart. Very rare risks include infection, bleeding, seizure, irregular heart rhythms and nerve injury.  Regional Anesthesia (“spinal”, “epidural”, & “nerve blocks”):  I understand that rare but potential complications include headache, bleeding, infection, seizure, irregular heart rhythms, and nerve injury.    I can change my mind about having anesthesia services at any time before I get the medicine.    _____________________________________________________________________________  Patient (or Representative) Signature/Relationship to Patient  Date   Time    _____________________________________________________________________________   Name (if used)    Language/Organization   Time    _____________________________________________________________________________  Anesthesiologist Signature     Date   Time  I have discussed the procedure and information above with the patient (or patient’s representative) and answered their questions. The patient or their representative has agreed to have anesthesia services.    _____________________________________________________________________________  Witness        Date   Time  I have verified that the signature is that of the patient or patient’s representative, and that it was signed before the procedure  Patient Name: Sandi Linder     : 1940                 Printed: 2025     Medical Record #: [id number]                     Page 2 of 2

## (undated) NOTE — IP AVS SNAPSHOT
[address]            (For Outpatient Use Only) Initial Admit Date: 1/8/2022   Inpt/Obs Admit Date: Inpt: 1/8/22 / Obs: N/A   Discharge Date:    Ashok Riosn:  [de-identified]   MRN: [de-identified]   CSN: [id number]   CEID: [id number] [employer]ARY INSURANCE   Payor:  Plan:    Group Number:  Insurance Type:    Subscriber Name:  Subscriber :    Subscriber ID:  Pt Rel to Subscriber:    Hospital Account Financial Class: Medicare    2022

## (undated) NOTE — ED AVS SNAPSHOT
[facility] Emergency Department    [place]  [address] [address]    Phone:  [phone]    Fax:  [address] [facility] 095   MRN: [id number]    Department:  [facility] Emergency Department   Date of Visit:  3/14/20 Medication Information       Follow the directions for taking your medications provided by your doctor. Please ask your health care provider, pharmacist or nurse if you have any questions regarding your home medications, including potential side effects. You were examined and treated today on an urgent basis only. This was not a substitute for ongoing medical care. Often, one Emergency Department visit does not uncover every injury or illness.  If you have been referred to a primary care or a specialist ph Luis Carlos Vaughn [address] ([address]) [address]  Iman Ashraf ([facility]) [phone] [address] ([address]) [phone]                Additional Information       We are concerned for your o cannot be excluded. Correlation   with CT chest examination may be done.       Dictated by: Yahaira Croft MD on 3/14/2017 at 12:02       Approved by: Yahaira Croft MD                    MyChart     Visit MyChart  You can access your MyChart to more

## (undated) NOTE — LETTER
March 14, 2017    Patient: Maninder Garcia   Date of Visit: 3/14/2017       To Whom It May Concern:    Cece Cruz was seen and treated in our emergency department on 3/14/2017. She may return to her residence at [facility].   She is not felt to be a commu

## (undated) NOTE — MR AVS SNAPSHOT
After Visit Summary   6/16/2020    Bear Garcia    MRN: [id number]           Visit Information     Date & Time  6/16/2020  8:45 AM Provider  REF Bobby Choudhury  [facility].  Phone  [phone]      Allergies as of 6/16/2020  R OXYBUTYNIN CHLORIDE 5 MG Oral Tab TAKE 1/2 TABLET BY MOUTH TWICE DAILY    metFORMIN HCl 1000 MG Oral Tab Take 1 tablet (1,000 mg total) by mouth 2 (two) times daily with meals.     guaiFENesin-codeine (VIRTUSSIN A/C) 100-10 MG/5ML Oral Solution Take 5 mL b EAT THESE FOODS MORE OFTEN: EAT THESE FOODS LESS OFTEN:   Make half your plate fruits and vegetables Highly refined, white starches including white bread, rice and pasta   Eat plenty of protein, keep the fat content low Sugars:  sodas and sports drinks, ca Visit face-to-face with a [facility] physician or   FERMIN using your mobile device or computer   using [address] with a [facility] Physician or FERMIN online. The physician will respond and provide   a treatment plan within a few hours.  ONLINE VISIT

## (undated) NOTE — IP AVS SNAPSHOT
Patient Demographics     Address  [address] [address] Phone  [phone] (Home) *Preferred*  [phone] [facility]) E-mail Address  [email or web address]      Emergency Contact(s)     Name Relation Home Work MEDINA Commonly known as:  MIDAMOR      Take 5 mg by mouth daily.           AmLODIPine Besylate 5 MG Tabs  Commonly known as:  NORVASC      TAKE 1 TABLET BY MOUTH TWICE DAILY   [place] MD Ariana         ASPIRIN LOW DOSE 81 MG Tbec  Generic drug:  aspirin      TAKE Commonly known as:  RHEUMATREX      TAKE 8 TABLETS BY MOUTH EVERY WEEK   Ayla Esteban MD         Metoprolol Succinate  MG Tb24  Commonly known as:   Toprol XL      TAKE 1 TABLET(100 MG) BY MOUTH TWICE DAILY   [place] MD Ariana         modafinil 100 MG T Order ID Medication Name Action Time Action Reason Comments    [id number] Acetaminophen-Codeine #3 (TYLENOL #3) 300-30 MG tab 1 tablet (Or Linked Group #1) 08/21/18 2040 Given      [id number] Acetaminophen-Codeine #3 (TYLENOL #3) 300-30 MG tab 1 tablet 08/2 [id number] docusate sodium (COLACE) cap 100 mg 08/21/18 2040 Given      [id number] docusate sodium (COLACE) cap 100 mg 08/22/18 0930 Given      [id number] folic acid (FOLVITE) tab 4 mg 08/22/18 0929 Given      [id number] isoniazid (NYDRAZID) tab 300 mg 08/22/ Flowsheet Row Most Recent Value   Mode  Spontaneous   Interface  Full face mask   Mask size  Medium   Set CPAP  11         Lab Results Last 24 Hours      BASIC METABOLIC PANEL (8) [[id number]] (Abnormal)  Resulted: 08/22/18 0549, Result status: Final result Lab - Abbreviation Name Director Address Valid Date Range    JORGE/ Mitch Morejon [address] [address] 02/03/16 1801 - Present            Microbiology Results (All)     None         H&P - H&P Note No date: TONSILLECTOMY     ALL:    Naproxen                OTHER (SEE COMMENTS)    Comment:headaches  Adhesive Tape           RASH    Comment:States makes skin \"raw\"     Home Medications:    Outpatient Prescriptions Marked as Taking for the 8/19/18 encou Neuro- A&OX3, no focal deficits      Diagnostic Data:    CBC/Chem  Recent Labs   Lab  08/19/18 0221   WBC  11.9   HGB  13.7   MCV  94.9   PLT  214.0       Recent Labs   Lab  08/19/18 0221   NA  137   K  4.0   CL  100*   CO2  28.0   BUN  15   CREATSERUM skull fracture identified. 3.  No acute intracranial hemorrhage, mass effect or midline shift. 4.  Chronic microvascular ischemic changes. 5.  Please refer to the separate dictation of the C-spine.     Dictated by: Darren Burkett MD on 8/19/2018 at 9:36 endplate osteophyte at C5. Normal foramen magnum with no Chiari malformation. There is moderate soft tissue swelling and edema and hemorrhage along the posterior spinous process ease between C2 and C5.   No significant prevertebral soft tissue swelling is Recent UTI- was ordered Rx 8/17 at [facility]. Ur clx growing 50-99K GNR, sensis pending. Rx was for bactrim. Will cont for now. [RJ.4]     SCDs[RJ.1], heparin if no intervention per DLDNKNP[QS.4]    **Certification      PHYSICIAN Certification of Need for Inpatie • C5 vertebral fracture (HCC)    • CVA (cerebral infarction)     x2   • HTN (hypertension)    • Parkinson's disease ([facility]    • RA (rheumatoid arthritis) (HCC)    • Type II or unspecified type diabetes mellitus without mention of complication, not stated as • mitral valve prolapse [OTHER] Sister    • Psoriasis [OTHER] Brother        Review of Systems  Comprehensive ROS reviewed and negative except for what's stated above.        OBJECTIVE:  /73 (BP Location: Right arm)   Pulse 62   Temp 98 °F (36.7 °C) ( and periventricular deep white matter consistent with chronic microvascular ischemic changes. There is nothing specific for acute infarct. There is no hemorrhage or mass lesion. Mild intracranial atherosclerotic vascular calcifications are identified.  Esther Moreno Consider followup CTA to evaluate for vertebral artery injury. No significant subluxation or dislocation of the C4 vertebral body at C3-4 or at C4-5. There is also an acute fracture of the spinous process of C3.   The C3 spinous process fracture fragments Acute C2,C3, C4 fractures  - spine surgery consulted  - aspen collar requested[RJ.1]  - morphine, norco prn  - bowel regimen  - PT/OT when cleared by spine surg    Fall- likely mechanical, pt reports unsteadiness on her feet  - has bruising behind L should consulted. No paresthesias, no new weakness, no cp, no sob. Family at bedside. ROS: neg  FHx: nc  PMHx:  RA, hypertension, CAD    PE:  Elderly female, NAD, eating a pizza. Very chatty. Tender C-spine. Aspen collar has been placed.   SED's on, workin pedicles, bilateral superior articular facets and the   posterior wall of the right foramen transversarium.  Consider followup MRI to evaluate the spinal cord as well as followup CTA of the neck to evaluate for any potential vertebral artery injury.  Spino WEIGHT BEARING RESTRICTION  Weight Bearing Restriction: None                PAIN ASSESSMENT   Ratin  Location: Neck & left shoulder + scapular  Management Techniques: Activity promotion; Body mechanics;Breathing techniques;Relaxation;Repositioning    BA to stand with min assist and cues for sequencing. Pt requests to use bathroom. Gait x 15' with r/w and min assist for balance to bathroom. Sit to/from  stand from commode chair with min assist. Pt required total assist with roland care.  Gait X 100 ft with r/ supervision      Goal #2 Patient is able to demonstrate transfers EOB to/from Chair/Wheelchair at assistance level: supervision      Goal #3 Patient is able to ambulate 150  feet with assist device: walker - rolling at assistance level: supervision      Go Closed displaced fracture of second cervical vertebra (HCC)    Closed displaced fracture of third cervical vertebra, unspecified fracture morphology, initial encounter [facility]    C4 pedicle fracture (HCC)    Closed displaced fracture of fourth cervical adina WEIGHT BEARING RESTRICTION[MD.1]  Weight Bearing Restriction: None[MD.2]                PAIN ASSESSMENT[MD.1]   Rating: 10  Location: Neck & left shoulder + scapular  Management Techniques: Activity promotion; Body mechanics;Breathing techniques;Relaxation; treatment. Aspen collar in place. Review of spinal precautions. Pt completes supine to sit with mod assist of 1 with log roll technique. Sits EOB with supervision/CGA. No C/o dizziness. Pt completes sit to stand with min assit of 1.  Gait x 50 ft with r/w a Goal #2 Patient is able to demonstrate transfers EOB to/from Chair/Wheelchair at assistance level: supervision      Goal #3 Patient is able to ambulate 150  feet with assist device: walker - rolling at assistance level: supervision      Goal #4     Goal #5 Closed displaced fracture of second cervical vertebra, unspecified fracture morphology, initial encounter ([facility]  Active Problems:    Parkinson disease (HCC)    SHANA (obstructive sleep apnea)    Closed displaced fracture of second cervical vertebra ([facility] OBJECTIVE  Precautions: Cervical brace;Bed/chair alarm (brace on at all times, no lifting >15lbs, no neck ROM)  Fall Risk: High fall risk  Per Dr. Africa Nelson : No Neck ROM,No lifting more than 15 lbs    WEIGHT BEARING RESTRICTION  Weight Bearing Restriction: N -   Need to walk in hospital room?: A Lot   -   Climbing 3-5 steps with a railing?: Total       AM-PAC Score:  Raw Score: 13   PT Approx Degree of Impairment Score: 64.91%   Standardized Score (AM-PAC Scale): 36.74   CMS Modifier (G-Code): CL    FUNCTIONAL shoulder & neck + scapular region,poor standing dynamic balance,poor safety awareness & generalized deconditioning[NP.2]. Functional outcome measures completed include[NP.1] AM PAC scores[NP.3].   Based on this evaluation, patient's clinical presentation i Goal Comments: Goals established on 8/20/2018[NP.1]     Attribution Morris    NP. 1 - Kirsten Barraza, PT on 8/20/2018 12:11 PM  NP.2 - Kirsten Barraza, PT on 8/20/2018 12:28 PM  NP.3 - Kirsten Barraza, PT on 8/20/2018 12:41 PM                        Dawit Gleason fracture morphology, initial encounter [facility])      Past Medical History  Past Medical History:   Diagnosis Date   • C2 cervical fracture ([facility]    • C5 vertebral fracture (HCC)    • CVA (cerebral infarction)     x2   • HTN (hypertension)    • Parkinson's dis Patient self-stated goal is[BW.1] to return to her apartment at [facility] without needing to go to rehab. [BW.2]     OBJECTIVE  Precautions: Cervical brace;Bed/chair alarm (brace on at all times, no lifting >15lbs, no neck ROM)  Fall Risk: High fall risk -   Taking care of personal grooming such as brushing teeth?: A Little (supervision)  -   Eating meals?: A Little (supervision)    AM-PAC Score:  Score: 17  Approx Degree of Impairment: 50.11%  Standardized Score (AM-PAC Scale): 37.26  CMS Modifier (G-Code aware of session/findings; All patient questions and concerns addressed;SCDs in place; Ice applied; Alarm set; Family present; Discussed recommendations with /    ASSESSMENT[BW.1]   Patient is a[BW.3 [de-identified]year old[BW.4]  female admitted Specific performance deficits impacting engagement in ADL/IADL[BW.1] LOW  1 - 3 performance deficits[BW.2]    Client Assessment/Performance Deficits[BW.1] LOW - No comorbidities nor modifications of tasks[BW.2]    Clinical Decision Making[BW.1] LOW - Eloisa Botulinum Toxin Type A Per Unit, Im Inj  09/21/16     Botulinum Toxin Type A Per Unit, Im Inj  06/13/16     Botulinum Toxin Type A Per Unit, Im Inj  03/16/16     Botulinum Toxin Type A Per Unit, Im Inj  12/09/15     Botulinum Toxin Type A Per Unit, Im Inj

## (undated) NOTE — MR AVS SNAPSHOT
After Visit Summary   9/13/2018    Sumanth Hodgson    MRN: [id number]           Visit Information     Date & Time  9/13/2018  7:45 AM Provider  REF Tri  Department  [facility] Reference Lab Dept.  Phone  [phone]      Allergies as of 9/13/2018  R Take with vitamin B6 50mg daily    Oxybutynin Chloride 5 MG Oral Tab One half tablet twice a day    ergocalciferol 28332 units Oral Cap Take 1 capsule (50,000 Units total) by mouth every 14 (fourteen) days.     METOPROLOL SUCCINATE  MG Oral Tablet 24 12/17/2018 2:30 PM Zafar [id number] Mary Jama    2/25/2019 11:20 AM Ayla Esteban RHEUMATOLOGY    8/12/2019 11:00 AM Yandel Jimenez PULMONARY MEDICINE                [facility] now offers Video Visits through [address] for meka Conditions needing urgent attention, but are not life-threatening. Average cost  $120*       EMERGENCY ROOM         Life-threatening emergencies needing immediate intervention   at a hospital emergency room.       Average cost  $2,300*   *Cost varie

## (undated) NOTE — IP AVS SNAPSHOT
Patient Demographics     Address  [address]  [address]  [facility] [address] Phone  [phone] (Home) *Preferred*  [phone] (Mobile) E-mail Address  [email or web address]      Patient Contacts     Name Relation Home Work Mobile    Keli Urbina Daughter [phone]    Liz Diaz Daughter [phone]    Yoana Stanley Daughter [phone]      Allergies as of 6/20/2024  Review status set to Review Complete on 6/14/2024       Noted Reaction Type Reactions    Naproxen 04/03/2015    OTHER (SEE COMMENTS)    headaches    Adhesive Tape 08/19/2018    RASH    States makes skin \"raw\"    Chocolate 01/08/2022    RASH    Strawberries 01/08/2022    RASH      Code Status Information     Code Status    DNAR/Selective Treatment        Patient Instructions       HAVE LAB DRAW - BMP - IN ONE WEEK        DIET RECOMMENDATIONS   Diet Recommendations - Solids: Regular  Diet Recommendations - Liquids: Thin Liquids  Compensatory Strategies Recommended: No straws;Slow rate;Small bites and sips;Multiple swallows;Extra sauce/gravy (Slow rate of intake)  Aspiration Precautions: Upright position;Slow rate;Small bites and sips;No straw  Medication Administration Recommendations: No restrictions       Going Home Instructions    In this section you will find the tools which will guide you through the first few days after you leave the hospital. Continued use of these tools will help you develop the skills necessary to keep your heart failure under control.       Home Care Instructions Following Heart Failure - the most important things to do every day include:     Weigh yourself  Take your medicines as prescribed  Limit your sodium (salt) and fluid intake  Know when to call your cardiologist, primary doctor, or nurse  Know when to seek emergency care    Things for You to Remember:   1. See your doctor or healthcare provider.  It is important that you  attend this appointment to make sure your symptoms are under control.     2. Your recommended sodium intake is 5564-8869 mg daily    3. Limit your fluid intake to no more than 2 liters or 64 ounces per day    4. Some exercise and activity is important to help keep your heart functioning and strong. Unless instructed not to exercise, you may walk at a slow to moderate pace for 10-15 minutes 2-3 days per week to start. Pace your activity to prevent shortness of breath or fatigue. Stop exercise if you develop chest pain, lightheadedness, or significant shortness of breath.       Call Your Cardiologist If:   You gain 2 pounds overnight or 3-4 pounds in 3-5 days  You have more difficulty breathing  You are getting more tired with normal activity  You are more short of breath lying down, or awaken at night short of breath  You have swelling of your feet or legs  You urinate less often during the day and more often at night  You have cramps in your legs  You have blurred vision or see yellowish-green halos around objects of lights    Go to the Emergency Room If:   You have pain or tightness in your chest  You are extremely short of breath  You are coughing up pink-frothy mucus  You are traveling and develop symptoms of worsening heart failure      [facility]   Phone: [phone]  Fax: [phone]        Patient Isolation Status     None to display      Microbiology Results (All)     Procedure Component Value Units Date/Time    Rapid SARS-CoV-2 by PCR [[id number]]  (Normal) Collected: 06/14/24 1352    Order Status: Completed Lab Status: Final result Updated: 06/14/24 1405    Specimen: Other from Nares      Rapid SARS-CoV-2 by PCR Not Detected      Immunizations     Name Date      Botulinum Toxin Type A Per Unit, Im Inj  08/06/18     Botulinum Toxin Type A Per Unit, Im Inj  05/10/18     Botulinum Toxin Type A Per Unit, Im Inj  02/01/18     Botulinum Toxin Type A Per Unit, Im Inj  11/08/17     Botulinum Toxin  Type A Per Unit, Im Inj  08/16/17     Botulinum Toxin Type A Per Unit, Im Inj  05/15/17     Botulinum Toxin Type A Per Unit, Im Inj  02/16/17     Botulinum Toxin Type A Per Unit, Im Inj  09/21/16     Botulinum Toxin Type A Per Unit, Im Inj  06/13/16     Botulinum Toxin Type A Per Unit, Im Inj  03/16/16     Botulinum Toxin Type A Per Unit, Im Inj  12/09/15     Botulinum Toxin Type A Per Unit, Im Inj  09/16/15     Botulinum Toxin Type A Per Unit, Im Inj  06/18/15     Covid-19 Pfizer 02/10/21     Covid-19 Pfizer 01/20/21     Depo-Medrol 40mg Inj 07/27/21     INFLUENZA 09/17/20     INFLUENZA 09/17/20     INFLUENZA 10/03/19     INFLUENZA 10/03/19     INFLUENZA 10/03/18     INFLUENZA 09/20/17     INFLUENZA 09/29/16     Kenalog Per 10mg Inj 09/17/20     Kenalog Per 10mg Inj 07/09/18     Kenalog Per 10mg, Bursa/Joint Inj 10/24/16     Pneumococcal (Prevnar 13) 11/22/16     Pneumovax 23 11/13/17     Pneumovax 23 12/03/11     Prolia Im Inj, Up To 60 Mg 12/03/21     Prolia Im Inj, Up To 60 Mg 05/27/21        Follow-up Information     Norm Hui MD. Schedule an appointment as soon as possible for a visit in 1 week(s).    Specialty: Internal Medicine  Contact information:  [address] [place] [address] [phone]             Gene Luther MD Follow up.    Specialty: CARDIOLOGY  Why: As scheduled 7/8/2024 @ 10: 20 AM     For outpatinet workup/ eval for watchman  Contact information:  [address] [place] [address] [phone]                        Your Home Meds List      TAKE these medications       Instructions Authorizing Provider Morning Afternoon Evening As Needed   Accu-Chek FastClix Lancets Misc      TEST THREE TIMES DAILY OR AS DIRECTED   Norm Mendoza         Accu-Chek Alyson SmartView w/Device Kit      Use to test blood sugar.   Norm Mendoza         Accu-Chek SmartView Strp      USE TO TEST THREE TIMES DAILY OR AS DIRECTED   Norm Mendoza         acetaminophen 500 MG Tabs  Commonly known as:  Tylenol Extra Strength  Next dose due: Tonight, 6/20/2024       Take 2 tablets (1,000 mg total) by mouth 2 (two) times daily. 0600 & 2100          acetaminophen 500 MG Tabs  Commonly known as: Tylenol Extra Strength  Next dose due: This evening, 6/20/2024       Take 1 tablet (500 mg total) by mouth in the morning and 1 tablet (500 mg total) before bedtime. 1100 & 1600.          Aspirin Low Dose 81 MG Tbec  Generic drug: aspirin  Next dose due: Tonight, 6/20/2024       TAKE 1 TABLET BY MOUTH TWICE DAILY   Norm Mendoza         BOTOX IJ  Next dose due: Take as directed       Inject as directed. Every 3 months          carbidopa-levodopa  MG Tabs  Commonly known as: SINEMET  Next dose due: Tonight, 6/20/2024       Take 1 tablet by mouth 3 (three) times daily.   Oksana Quinonez         cyanocobalamin 1000 MCG/ML Soln  Commonly known as: Vitamin B12  Next dose due: Resume home schedule       Inject 1 mL (1,000 mcg total) into the muscle every 30 (thirty) days. On the 1st of every month          dextromethorphan-guaiFENesin  MG/5ML Syrp  Commonly known as: ROBITUSSIN DM      Take 5 mL by mouth every 12 (twelve) hours as needed.          ergocalciferol 1.25 MG (44139 UT) Caps  Commonly known as: Vitamin D2  Next dose due: Resume home schedule       TAKE 1 CAPSULE BY MOUTH EVERY 14 DAYS   Ayla Rhew         fluticasone propionate 50 MCG/ACT Susp  Commonly known as: Flonase  Next dose due: Friday, 6/21/2024       1 spray by Each Nare route daily.          folic acid 1 MG Tabs  Commonly known as: Folvite  Next dose due: Tonight, 6/20/2024       Take 2 tablets (2 mg total) by mouth daily. 1400          furosemide 40 MG Tabs  Commonly known as: Lasix  Next dose due: This evening, 6/20/2024       Take 1 tablet (40 mg total) by mouth 2 (two) times daily.  Stop taking on: July 19, 2024   Compa Pablo         gabapentin 300 MG Caps  Commonly known as: Neurontin  Next dose due: This evening, 6/20/2024       Take 1 capsule  (300 mg total) by mouth 2 (two) times a day. At 7pm & 9pm.   Norm Mendoza         glimepiride 2 MG Tabs  Commonly known as: Amaryl  Next dose due: Friday, 6/21/2024       Take 1 tablet (2 mg total) by mouth daily with breakfast.          Insulin Pen Needle 31G X 5 MM Misc  Commonly known as: BD Pen Needle Mini U/F      Use one needle daily to administer forteo injections   Ayla Rhew         ipratropium-albuterol  MCG/ACT Aers  Commonly known as: Combivent  Next dose due: Resume home schedule       Inhale 1 puff into the lungs 4 (four) times daily.          levoFLOXacin 250 MG Tabs  Commonly known as: Levaquin  Next dose due: Friday, 6/21/2024       Take 1 tablet (250 mg total) by mouth daily.          metFORMIN 500 MG Tabs  Commonly known as: Glucophage  Next dose due: This evening, 6/20/2024       Take 2 tablets (1,000 mg total) by mouth 2 (two) times daily with meals.   Meka Cristina         metoprolol succinate  MG Tb24  Commonly known as: Toprol XL  Next dose due: Tonight, 6/20/2024       TAKE 1 TABLET(100 MG) BY MOUTH TWICE DAILY   Norm Mendoza         nystatin 100,000 Units/g Crea  Commonly known as: Mycostatin  Next dose due: Tonight, 6/20/2024       Apply 1 Application topically 2 (two) times daily for 10 days. Abdominal folds  Stop taking on: June 29, 2024   Marilin Ovalle         pantoprazole 40 MG Tbec  Commonly known as: Protonix  Next dose due: Friday, 6/21/2024       Take 1 tablet (40 mg total) by mouth every morning before breakfast.          prednisoLONE 1 % Susp  Commonly known as: Pred Forte  Next dose due: Resume home schedule       Place 1 drop into the left eye 4 (four) times daily.   Luna Fernandez         REMICADE IV  Next dose due: Resume home schedule       Inject 10 mg/kg into the vein. Every six weeks          sacubitril-valsartan 24-26 MG Tabs  Commonly known as: Entresto  Next dose due: Tonight, 6/20/2024       Take 1 tablet by mouth 2 (two) times daily.  Stop taking  on: July 19, 2024   Compa Pablo         sertraline 50 MG Tabs  Commonly known as: Zoloft  Next dose due: Tonight, 6/20/2024       Take 1 tablet (50 mg total) by mouth daily.   Norm Mendoza         simvastatin 20 MG Tabs  Commonly known as: Zocor  Next dose due: Tonight, 6/20/2024       TAKE 1 TABLET BY MOUTH EVERY DAY   Norm Mendoza         spironolactone 25 MG Tabs  Commonly known as: Aldactone  Next dose due: Friday, 6/21/2024       Take 1 tablet (25 mg total) by mouth daily.  Stop taking on: July 20, 2024   Compa Pablo            ASK your doctor about these medications       Instructions Authorizing Provider Morning Afternoon Evening As Needed   cyclopentolate 1 % Soln  Commonly known as: Cyclogyl      Apply 1 drop to eye 2 (two) times daily. To affected eye   Luna Fernandez         denosumab 60 MG/ML Sosy  Commonly known as: Prolia      Inject 1 mL (60 mg total) into the skin once. Historical documentation - see Epic Immunization Activity for administration details   Ayla Esteban         ipratropium 0.06 % Soln  Commonly known as: Atrovent      2 sprays by Nasal route 3 (three) times daily.   Odilia Elkins         methotrexate 2.5 MG Tabs  Commonly known as: Rheumatrex      TAKE 8 TABLETS BY MOUTH EVERY 7 DAYS.   Ayla Esteban         modafinil 100 MG Tabs  Commonly known as: Provigil      TAKE 1 TABLET BY MOUTH EVERY DAY.   Norm Mendoza         oxybutynin 5 MG Tabs  Commonly known as: Ditropan      TAKE 1/2 TABLET BY MOUTH TWICE DAILY   Norm Mendoza         pyridoxine 50 MG Tabs  Commonly known as: Vitamin B6      Take 50 mg by mouth daily.          Teriparatide (Recombinant) 600 MCG/2.4ML Soln      Inject 20 mcg into the skin daily.   Ayla Esteban         tiZANidine HCl 4 MG Caps  Commonly known as: ZANAFLEX      Take 1 capsule (4 mg total) by mouth 3 (three) times daily.   Norm Mendoza               Where to Get Your Medications      These medications were sent to [facility] - [place], [place] - [address]  [facility] [phone], [phone] [address] [facility] [address]    Phone: [phone]   furosemide 40 MG Tabs  sacubitril-valsartan 24-26 MG Tabs  spironolactone 25 MG Tabs     Please  your prescriptions at the location directed by your doctor or nurse    Bring a paper prescription for each of these medications  nystatin 100,000 Units/g Crea           [id number] - MAR ACTION REPORT  (last 48 hrs)    ** SITE UNKNOWN **     Order ID Medication Name Action Time Action Reason Comments    [id number] acetaminophen (Tylenol Extra Strength) tab 1,000 mg 06/18/24 2208 Given      [id number] acetaminophen (Tylenol Extra Strength) tab 1,000 mg 06/19/24 0624 Given      [id number] acetaminophen (Tylenol Extra Strength) tab 1,000 mg 06/19/24 2155 Given      [id number] acetaminophen (Tylenol Extra Strength) tab 1,000 mg 06/20/24 0632 Given      [id number] acetaminophen (Tylenol Extra Strength) tab 500 mg 06/18/24 1716 Given      [id number] acetaminophen (Tylenol Extra Strength) tab 500 mg 06/19/24 1145 Given      [id number] acetaminophen (Tylenol Extra Strength) tab 500 mg 06/19/24 1719 Given      [id number] acetaminophen (Tylenol Extra Strength) tab 500 mg 06/20/24 1208 Given      [id number] albuterol (Ventolin HFA) 108 (90 Base) MCG/ACT inhaler 1 puff 06/18/24 2211 Given      [id number] albuterol (Ventolin HFA) 108 (90 Base) MCG/ACT inhaler 1 puff 06/19/24 0922 Given      [id number] albuterol (Ventolin HFA) 108 (90 Base) MCG/ACT inhaler 1 puff 06/19/24 1320 Given      [id number] albuterol (Ventolin HFA) 108 (90 Base) MCG/ACT inhaler 1 puff 06/19/24 1720 Given      [id number] albuterol (Ventolin HFA) 108 (90 Base) MCG/ACT inhaler 1 puff 06/19/24 2040 Given      [id number] albuterol (Ventolin HFA) 108 (90 Base) MCG/ACT inhaler 1 puff 06/20/24 1208 Given      [id number] amLODIPine (Norvasc) tab 5 mg 06/18/24 2208 Given      [id number] amLODIPine (Norvasc) tab 5 mg 06/19/24 0921 Given      [id number] amLODIPine (Norvasc)  tab 5 mg 06/19/24 2155 Given      [id number] amLODIPine (Norvasc) tab 5 mg 06/20/24 0837 Given      [id number] aspirin DR tab 81 mg 06/18/24 2208 Given      [id number] aspirin DR tab 81 mg 06/19/24 0921 Given      [id number] aspirin DR tab 81 mg 06/19/24 2155 Given      [id number] aspirin DR tab 81 mg 06/20/24 0838 Given      [id number] atorvastatin (Lipitor) tab 10 mg 06/18/24 2208 Given      [id number] atorvastatin (Lipitor) tab 10 mg 06/19/24 2149 Given      [id number] carbidopa-levodopa (SINEMET)  MG per tab 1 tablet 06/18/24 1326 Given      [id number] carbidopa-levodopa (SINEMET)  MG per tab 1 tablet 06/18/24 1901 Given      [id number] carbidopa-levodopa (SINEMET)  MG per tab 1 tablet 06/19/24 0624 Given      [id number] carbidopa-levodopa (SINEMET)  MG per tab 1 tablet 06/19/24 1320 Given      [id number] carbidopa-levodopa (SINEMET)  MG per tab 1 tablet 06/19/24 1812 Given      [id number] carbidopa-levodopa (SINEMET)  MG per tab 1 tablet 06/20/24 0632 Given      [id number] furosemide (Lasix) tab 40 mg 06/19/24 1719 Given      [id number] furosemide (Lasix) tab 40 mg 06/20/24 0838 Given      [id number] gabapentin (Neurontin) cap 300 mg 06/18/24 1901 Given      [id number] gabapentin (Neurontin) cap 300 mg 06/19/24 1812 Given      [id number] gabapentin (Neurontin) cap 300 mg 06/19/24 2154 Given      [id number] heparin (Porcine) 05731 units/250mL infusion ACS/AFIB CONTINUOUS 06/18/24 1326 New Bag      [id number] heparin (Porcine) 12127 units/250mL infusion ACS/AFIB CONTINUOUS 06/19/24 0920 Hi-Risk Rate/Dose Change      [id number] heparin (Porcine) 80689 units/250mL infusion ACS/AFIB CONTINUOUS 06/19/24 1759 New Bag      [id number] magnesium oxide (Mag-Ox) tab 400 mg 06/19/24 1145 Given      [id number] melatonin tab 3 mg 06/18/24 2219 Given      [id number] melatonin tab 3 mg 06/19/24 2204 Given      [id number] metoprolol succinate ER (Toprol XL) 24 hr tab 100 mg 06/18/24 2208 Given      [id number]  metoprolol succinate ER (Toprol XL) 24 hr tab 100 mg 06/19/24 0921 Given      [id number] metoprolol succinate ER (Toprol XL) 24 hr tab 100 mg 06/19/24 2155 Given      [id number] metoprolol succinate ER (Toprol XL) 24 hr tab 100 mg 06/20/24 0955 Given      [id number] nystatin (Mycostatin) 100,000 Units/g cream 06/19/24 0922 Given      [id number] nystatin (Mycostatin) 100,000 Units/g cream 06/19/24 2105 Given      [id number] pantoprazole (Protonix) DR tab 40 mg 06/19/24 0624 Given      [id number] pantoprazole (Protonix) DR tab 40 mg 06/20/24 0632 Given      [id number] potassium chloride (Klor-Con M20) tab 40 mEq 06/19/24 1145 Given      [id number] prednisoLONE (Pred Forte) 1 % ophthalmic suspension 1 drop 06/19/24 0922 Given      [id number] prednisoLONE (Pred Forte) 1 % ophthalmic suspension 1 drop 06/20/24 0840 Given      [id number] sacubitril-valsartan (Entresto) 24-26 MG per tab 1 tablet 06/19/24 1320 Given      [id number] sacubitril-valsartan (Entresto) 24-26 MG per tab 1 tablet 06/19/24 2154 Given      [id number] sacubitril-valsartan (Entresto) 24-26 MG per tab 1 tablet 06/20/24 0837 Given      [id number] sertraline (Zoloft) tab 50 mg 06/18/24 2208 Given      [id number] sertraline (Zoloft) tab 50 mg 06/19/24 2155 Given      [id number] spironolactone (Aldactone) tab 25 mg 06/19/24 0921 Given      [id number] spironolactone (Aldactone) tab 25 mg 06/20/24 0837 Given      [id number] umeclidinium bromide (Incruse Ellipta) 62.5 MCG/ACT inhaler 1 puff 06/19/24 0923 Given      [id number] umeclidinium bromide (Incruse Ellipta) 62.5 MCG/ACT inhaler 1 puff 06/20/24 0840 Given            LEFT UPPER ABDOMEN     Order ID Medication Name Action Time Action Reason Comments    [id number] insulin aspart (NovoLOG) 100 Units/mL FlexPen 1-5 Units 06/19/24 2223 Given  bg 178          LEFT UPPER ARM     Order ID Medication Name Action Time Action Reason Comments    [id number] insulin aspart (NovoLOG) 100 Units/mL FlexPen 1-5 Units 06/18/24 2212 Given       [id number] insulin aspart (NovoLOG) 100 Units/mL FlexPen 1-5 Units 06/19/24 1149 Given      [id number] insulin aspart (NovoLOG) 100 Units/mL FlexPen 1-5 Units 06/20/24 0610 Given  bg 176          RIGHT UPPER ARM     Order ID Medication Name Action Time Action Reason Comments    [id number] insulin aspart (NovoLOG) 100 Units/mL FlexPen 1-5 Units 06/19/24 1720 Given      [id number] insulin aspart (NovoLOG) 100 Units/mL FlexPen 1-5 Units 06/20/24 1208 Given              Recent Vital Signs    Flowsheet Row Most Recent Value   /87 Filed at 06/20/2024 1120   Pulse 68 Filed at 06/20/2024 1120   Resp 18 Filed at 06/20/2024 1120   Temp 97.5 °F (36.4 °C) Filed at 06/20/2024 1120   SpO2 91 % Filed at 06/20/2024 1120      Patient's Most Recent Weight    Flowsheet Row Most Recent Value   Patient Weight 80.6 kg (177 lb 11.1 oz)         Lab Results Last 24 Hours      PTT, Activated [[id number]] (Abnormal)  Resulted: 06/20/24 0807, Result status: Final result   Ordering provider: Compa Pablo MD  06/19/24 2300 Resulting lab: [facility] ([facility])    Specimen Information    Type Source Collected On   Blood — 06/20/24 0704          Components    Component Value Reference Range Flag Lab   PTT 53.4 23.0 - 36.0 seconds [facility] ([facility])   Comment:          The aPTT Heparin Therapeutic Range is approximately 65- 104 seconds. The therapeutic range has been validated against 0.3-0.7 heparin anti-Xa units/mL.     Elevations of the aPTT in patients not receiving anticoagulant therapy (Heparin, etc.), may be seen in Factor deficiency, vitamin K deficiency, factor inhibitors, liver disease, etc.   Clinical correlation is recommended.                   Magnesium [[id number]] (Normal)  Resulted: 06/20/24 0802, Result status: Final result   Ordering provider: Compa Pablo MD  06/19/24 2300 Resulting lab: [facility] ([facility])    Specimen Information    Type Source Collected On   Blood — 06/20/24 0704           Components    Component Value Reference Range Flag Lab   Magnesium 1.9 1.6 - 2.6 mg/dL — [facility] ([facility])            Potassium [[id number]] (Normal)  Resulted: 06/20/24 0802, Result status: Final result   Ordering provider: Compa Pablo MD  06/19/24 2300 Resulting lab: [facility] ([facility])    Specimen Information    Type Source Collected On   Blood — 06/20/24 0704          Components    Component Value Reference Range Flag Lab   Potassium 4.0 3.5 - 5.1 mmol/L — [facility] ([facility])            PTT, Activated [[id number]] (Abnormal)  Resulted: 06/19/24 1614, Result status: Final result   Ordering provider: Compa Pablo MD  06/19/24 0920 Resulting lab: [facility] ([facility])    Specimen Information    Type Source Collected On   Blood — 06/19/24 1538          Components    Component Value Reference Range Flag Lab   PTT 56.3 23.0 - 36.0 seconds H [facility] ([facility])   Comment:          The aPTT Heparin Therapeutic Range is approximately 65- 104 seconds. The therapeutic range has been validated against 0.3-0.7 heparin anti-Xa units/mL.     Elevations of the aPTT in patients not receiving anticoagulant therapy (Heparin, etc.), may be seen in Factor deficiency, vitamin K deficiency, factor inhibitors, liver disease, etc.   Clinical correlation is recommended.                   Testing Performed By     Lab - Abbreviation Name Director Address Valid Date Range    139 - [facility] ([facility]) [facility] ([facility]) Goldberg, Cathryn A. MD [address] [place] [id number] 03/19/20 1441 - Present               H&P - H&P Note      H&P signed by Christopher Horton MD at 6/14/2024  4:57 PM  Version 2 of 2    Author: Christopher Horton MD Service: — Author Type: Physician    Filed: 6/14/2024  4:57 PM Date of Service: 6/14/2024  1:23 PM Status: Addendum    : Christopher Horton MD (Physician)    Related Notes: Original Note by Christopher Horton MD (Physician) filed at  6/14/2024  2:54 PM           [facility] Hospitalist History and Physical       ASSESSMENT / PLAN:   Sandi Linder  is a 83 year old female with TDM, parkinsons disease, HTN, HLD, chronic leg edema, SHANA on CPAP, seronegative RA, admitted with SOB.     SOB / Acute CHF  - with elevated BNP, edema, orthopnea, concerning for volume overload   - IV lasix  - cards evaluation  - echo   - tele  - I/O, daily weights  - wean o2 as able.    New Onset Atrial Fibrillation   - cards eval appreciated  - echo ordered and pending  - monitor on telemetry   - continue beta blocker  - check TSH     HTN / HLD  - resume home meds as ordered     SHANA  - CPAP     Seronegative RA  - receives remicade infusions. Follow up with rheum outpatient     Parkinson's Disease  - cont home carbidopa/levadopa    T2DM  - SSI, accucheks     VTE ppx: lovenox    Dispo: Admitted inpatient. ADOD tbd.     PT is DNR/DNI. OK with vasopressors and anti-arrhythmics, and ICU level care of indicated. No CPR, no cardioversion, no intubation. Discussed with patient and with daughters at bedside.     Daughter Liz is POA if needed.     16 mins spent advance care planning face to face.     PCP: Norm Mendoza MD    Concerns regarding plan of care were discussed with patient. Patient agrees with plan as detailed above.       HISTORY:   CC:   Chief Complaint   Patient presents with    Difficulty Breathing        PCP: Norm Mendoza MD    History of Present Illness:   Sandi Linder  is a 83 year old female with TDM, parkinsons disease, HTN, HLD, chronic leg edema, SHANA on CPAP, seronegative RA, admitted with SOB.     She lives at a nursing facility. She is largely wheelchair bound but came transfer. Over the last few day she has become increasingly short of breath. She mainly gets SOB with exertion. No chest pain. She also has orthopnea and says the last couple nights she has been sleeping in a chair. She has chronic leg edema, unclear if getting worse. She takes oral lasix. She  denies fevers, chills. Has occasional cough with whitish sputum.       Objectives    /81   Pulse 66   Temp 96.9 °F (36.1 °C) (Temporal)   Resp 25   Ht 5' 2\" (1.575 m)   Wt 184 lb (83.5 kg)   SpO2 97%   BMI 33.65 kg/m²     Physical Exam  Constitutional:       Appearance: She is not toxic-appearing.   HENT:      Head: Normocephalic.      Mouth/Throat:      Mouth: Mucous membranes are moist.   Eyes:      Extraocular Movements: Extraocular movements intact.      Conjunctiva/sclera: Conjunctivae normal.      Pupils: Pupils are equal, round, and reactive to light.   Cardiovascular:      Rate and Rhythm: Normal rate and regular rhythm.      Heart sounds: No murmur heard.  Pulmonary:      Effort: No respiratory distress.      Breath sounds: Normal breath sounds. No wheezing.   Abdominal:      General: There is no distension.      Palpations: Abdomen is soft.      Tenderness: There is no abdominal tenderness.   Musculoskeletal:      Comments: 1+ bilateral LE edema    Skin:     Findings: No rash.   Neurological:      General: No focal deficit present.      Mental Status: She is alert.      Cranial Nerves: No cranial nerve deficit.   Psychiatric:         Mood and Affect: Mood normal.         PMH  Past Medical History:    C2 cervical fracture (HCC)    C5 vertebral fracture (HCC)    CVA (cerebral infarction)    x2    GERD (gastroesophageal reflux disease)    HTN (hypertension)    SHANA (obstructive sleep apnea)    Osteoarthritis    Overactive bladder    Parkinson's disease (HCC)    Pulmonary hypertension (HCC)    RA (rheumatoid arthritis) (HCC)    Type II or unspecified type diabetes mellitus without mention of complication, not stated as uncontrolled    Unspecified sleep apnea    AHI 78 RDI 80 SaO2 deirdre 85 % CPAP 11  Sleep RX/ now HME    Vitamin D deficiency    Vitamin D deficiency        PSH  Past Surgical History:   Procedure Laterality Date    Cholecystectomy      Hernia surgery      Hip replacement surgery  Bilateral     Knee replacement surgery Right     Other surgical history      \"Veins removed\"    Removal of heel spur Right     Tonsillectomy          ALL:  Allergies   Allergen Reactions    Naproxen OTHER (SEE COMMENTS)     headaches    Adhesive Tape RASH     States makes skin \"raw\"    Chocolate RASH    Strawberries RASH        Home Medications:  [unfilled]     Soc Hx  Social History     Tobacco Use    Smoking status: Never    Smokeless tobacco: Never   Substance Use Topics    Alcohol use: No     Alcohol/week: 0.0 standard drinks of alcohol        Fam Hx  Family History   Problem Relation Age of Onset    Other (Other) Mother         RA    Other (Other) Sister         RA    Other (mitral valve prolapse) Sister     Other (Psoriasis) Brother        Review of Systems  A comprehensive 10 point review of systems was completed.  Pertinent positives and negatives noted in the the HPI.      DIAGNOSTIC DATA:     Recent Results (from the past 24 hour(s))   RAINBOW DRAW LAVENDER    Collection Time: 06/14/24 11:48 AM   Result Value Ref Range    Hold Lavender Auto Resulted    RAINBOW DRAW LIGHT GREEN    Collection Time: 06/14/24 11:48 AM   Result Value Ref Range    Hold Lt Green Auto Resulted    Comp Metabolic Panel (14)    Collection Time: 06/14/24 11:48 AM   Result Value Ref Range    Glucose 199 (H) 70 - 99 mg/dL    Sodium 140 136 - 145 mmol/L    Potassium 4.6 3.5 - 5.1 mmol/L    Chloride 108 98 - 112 mmol/L    CO2 26.0 21.0 - 32.0 mmol/L    Anion Gap 6 0 - 18 mmol/L    BUN 20 9 - 23 mg/dL    Creatinine 0.97 0.55 - 1.02 mg/dL    Calcium, Total 9.2 8.5 - 10.1 mg/dL    Calculated Osmolality 298 (H) 275 - 295 mOsm/kg    eGFR-Cr 57 (L) >=60 mL/min/1.73m2    AST 33 15 - 37 U/L    ALT 10 (L) 13 - 56 U/L    Alkaline Phosphatase 148 (H) 55 - 142 U/L    Bilirubin, Total 0.7 0.1 - 2.0 mg/dL    Total Protein 8.0 6.4 - 8.2 g/dL    Albumin 3.3 (L) 3.4 - 5.0 g/dL    Globulin  4.7 (H) 2.8 - 4.4 g/dL    A/G Ratio 0.7 (L) 1.0 - 2.0    Troponin I (High Sensitivity)    Collection Time: 06/14/24 11:48 AM   Result Value Ref Range    Troponin I (High Sensitivity) 10 <=54 ng/L   Pro Beta Natriuretic Peptide    Collection Time: 06/14/24 11:48 AM   Result Value Ref Range    Pro-Beta Natriuretic Peptide 1,490 (H) <450 pg/mL   CBC W/ DIFFERENTIAL    Collection Time: 06/14/24 11:48 AM   Result Value Ref Range    WBC 8.8 4.0 - 11.0 x10(3) uL    RBC 3.36 (L) 3.80 - 5.30 x10(6)uL    HGB 9.1 (L) 12.0 - 16.0 g/dL    HCT 30.2 (L) 35.0 - 48.0 %    .0 150.0 - 450.0 10(3)uL    MCV 89.9 80.0 - 100.0 fL    MCH 27.1 26.0 - 34.0 pg    MCHC 30.1 (L) 31.0 - 37.0 g/dL    RDW 16.2 %    Neutrophil Absolute Prelim 5.68 1.50 - 7.70 x10 (3) uL    Neutrophil Absolute 5.68 1.50 - 7.70 x10(3) uL    Lymphocyte Absolute 2.02 1.00 - 4.00 x10(3) uL    Monocyte Absolute 0.85 0.10 - 1.00 x10(3) uL    Eosinophil Absolute 0.13 0.00 - 0.70 x10(3) uL    Basophil Absolute 0.03 0.00 - 0.20 x10(3) uL    Immature Granulocyte Absolute 0.04 0.00 - 1.00 x10(3) uL    Neutrophil % 64.9 %    Lymphocyte % 23.1 %    Monocyte % 9.7 %    Eosinophil % 1.5 %    Basophil % 0.3 %    Immature Granulocyte % 0.5 %   RAINBOW DRAW BLUE    Collection Time: 06/14/24 11:49 AM   Result Value Ref Range    Hold Blue Auto Resulted    Prothrombin Time (PT)    Collection Time: 06/14/24 11:49 AM   Result Value Ref Range    PT 16.0 (H) 11.6 - 14.8 seconds    INR 1.27 (H) 0.80 - 1.20   PTT, Activated    Collection Time: 06/14/24 11:49 AM   Result Value Ref Range    PTT 34.9 23.0 - 36.0 seconds   EKG 12 Lead    Collection Time: 06/14/24 11:49 AM   Result Value Ref Range    Ventricular rate 76 BPM    Atrial rate  BPM    P-R Interval  ms    QRS Duration 86 ms    Q-T Interval 400 ms    QTC Calculation (Bezet) 450 ms    P Axis  degrees    R Axis 14 degrees    T Axis -3 degrees       Additional Diagnostics:   ECG:     Radiology: [unfilled]      Electronically signed by Christopher Horton MD on 6/14/2024  4:57 PM     H&P  signed by Christopher Horton MD at 6/14/2024  2:54 PM  Version 1 of 2    Author: Christopher Horton MD Service: — Author Type: Physician    Filed: 6/14/2024  2:54 PM Date of Service: 6/14/2024  1:23 PM Status: Signed    : Christopher Horton MD (Physician)    Related Notes: Addendum by Christopher Horton MD (Physician) filed at 6/14/2024  4:57 PM           [facility] Hospitalist History and Physical       ASSESSMENT / PLAN:   Sandi Linder  is a 83 year old female with TDM, parkinsons disease, HTN, HLD, chronic leg edema, SHANA on CPAP, seronegative RA, admitted with SOB.     SOB / Acute CHF  - with elevated BNP, rales on exam, concerning for volume overload  - IV lasix  - cards evaluation  - echo   - tele  - I/O, daily weights  - wean o2 as able.    HTN / HLD  - resume home meds as ordered     SHANA  - CPAP     Seronegative RA  - resume home meds     Parkinson's Disease  - cont home carbidopa/levadopa    T2DM  - SSI, accucheks     VTE ppx: lovenox    Dispo: Admitted inpatient. ADOD tbd.     PT is DNR/DNI. OK with vasopressors and anti-arrhythmics, and ICU level care of indicated. No CPR, no cardioversion, no intubation. Discussed with patient and with daughters at bedside.     Daughter Liz is POA if needed.     16 mins spent advance care planning face to face.     PCP: Nomr Mendoza MD    Concerns regarding plan of care were discussed with patient. Patient agrees with plan as detailed above.       HISTORY:   CC:   Chief Complaint   Patient presents with    Difficulty Breathing        PCP: Norm Mendoza MD    History of Present Illness:   Sandi Linder  is a 83 year old female with TDM, parkinsons disease, HTN, HLD, chronic leg edema, SHANA on CPAP, seronegative RA, admitted with SOB.     She lives at a nursing facility. She is largely wheelchair bound but came transfer. Over the last few day she has become increasingly short of breath. She mainly gets SOB with exertion. No chest pain. She also has orthopnea and says the last couple  nights she has been sleeping in a chair. She has chronic leg edema, unclear if getting worse. She takes oral lasix. She denies fevers, chills. Has occasional cough with whitish sputum.       Objectives    /81   Pulse 66   Temp 96.9 °F (36.1 °C) (Temporal)   Resp 25   Ht 5' 2\" (1.575 m)   Wt 184 lb (83.5 kg)   SpO2 97%   BMI 33.65 kg/m²     Physical Exam  Constitutional:       Appearance: She is not toxic-appearing.   HENT:      Head: Normocephalic.      Mouth/Throat:      Mouth: Mucous membranes are moist.   Eyes:      Extraocular Movements: Extraocular movements intact.      Conjunctiva/sclera: Conjunctivae normal.      Pupils: Pupils are equal, round, and reactive to light.   Cardiovascular:      Rate and Rhythm: Normal rate and regular rhythm.      Heart sounds: No murmur heard.  Pulmonary:      Effort: No respiratory distress.      Breath sounds: Normal breath sounds. No wheezing.   Abdominal:      General: There is no distension.      Palpations: Abdomen is soft.      Tenderness: There is no abdominal tenderness.   Musculoskeletal:      Comments: 1+ bilateral LE edema    Skin:     Findings: No rash.   Neurological:      General: No focal deficit present.      Mental Status: She is alert.      Cranial Nerves: No cranial nerve deficit.   Psychiatric:         Mood and Affect: Mood normal.         PMH  Past Medical History:    C2 cervical fracture (HCC)    C5 vertebral fracture (HCC)    CVA (cerebral infarction)    x2    GERD (gastroesophageal reflux disease)    HTN (hypertension)    SHANA (obstructive sleep apnea)    Osteoarthritis    Overactive bladder    Parkinson's disease (HCC)    Pulmonary hypertension (HCC)    RA (rheumatoid arthritis) (HCC)    Type II or unspecified type diabetes mellitus without mention of complication, not stated as uncontrolled    Unspecified sleep apnea    AHI 78 RDI 80 SaO2 deirdre 85 % CPAP 11  Sleep RX/ now HME    Vitamin D deficiency    Vitamin D deficiency        PSH  Past  Surgical History:   Procedure Laterality Date    Cholecystectomy      Hernia surgery      Hip replacement surgery Bilateral     Knee replacement surgery Right     Other surgical history      \"Veins removed\"    Removal of heel spur Right     Tonsillectomy          ALL:  Allergies   Allergen Reactions    Naproxen OTHER (SEE COMMENTS)     headaches    Adhesive Tape RASH     States makes skin \"raw\"    Chocolate RASH    Strawberries RASH        Home Medications:  [unfilled]     Soc Hx  Social History     Tobacco Use    Smoking status: Never    Smokeless tobacco: Never   Substance Use Topics    Alcohol use: No     Alcohol/week: 0.0 standard drinks of alcohol        Fam Hx  Family History   Problem Relation Age of Onset    Other (Other) Mother         RA    Other (Other) Sister         RA    Other (mitral valve prolapse) Sister     Other (Psoriasis) Brother        Review of Systems  A comprehensive 10 point review of systems was completed.  Pertinent positives and negatives noted in the the HPI.      DIAGNOSTIC DATA:     Recent Results (from the past 24 hour(s))   RAINBOW DRAW LAVENDER    Collection Time: 06/14/24 11:48 AM   Result Value Ref Range    Hold Lavender Auto Resulted    RAINBOW DRAW LIGHT GREEN    Collection Time: 06/14/24 11:48 AM   Result Value Ref Range    Hold Lt Green Auto Resulted    Comp Metabolic Panel (14)    Collection Time: 06/14/24 11:48 AM   Result Value Ref Range    Glucose 199 (H) 70 - 99 mg/dL    Sodium 140 136 - 145 mmol/L    Potassium 4.6 3.5 - 5.1 mmol/L    Chloride 108 98 - 112 mmol/L    CO2 26.0 21.0 - 32.0 mmol/L    Anion Gap 6 0 - 18 mmol/L    BUN 20 9 - 23 mg/dL    Creatinine 0.97 0.55 - 1.02 mg/dL    Calcium, Total 9.2 8.5 - 10.1 mg/dL    Calculated Osmolality 298 (H) 275 - 295 mOsm/kg    eGFR-Cr 57 (L) >=60 mL/min/1.73m2    AST 33 15 - 37 U/L    ALT 10 (L) 13 - 56 U/L    Alkaline Phosphatase 148 (H) 55 - 142 U/L    Bilirubin, Total 0.7 0.1 - 2.0 mg/dL    Total Protein 8.0 6.4 - 8.2  g/dL    Albumin 3.3 (L) 3.4 - 5.0 g/dL    Globulin  4.7 (H) 2.8 - 4.4 g/dL    A/G Ratio 0.7 (L) 1.0 - 2.0   Troponin I (High Sensitivity)    Collection Time: 06/14/24 11:48 AM   Result Value Ref Range    Troponin I (High Sensitivity) 10 <=54 ng/L   Pro Beta Natriuretic Peptide    Collection Time: 06/14/24 11:48 AM   Result Value Ref Range    Pro-Beta Natriuretic Peptide 1,490 (H) <450 pg/mL   CBC W/ DIFFERENTIAL    Collection Time: 06/14/24 11:48 AM   Result Value Ref Range    WBC 8.8 4.0 - 11.0 x10(3) uL    RBC 3.36 (L) 3.80 - 5.30 x10(6)uL    HGB 9.1 (L) 12.0 - 16.0 g/dL    HCT 30.2 (L) 35.0 - 48.0 %    .0 150.0 - 450.0 10(3)uL    MCV 89.9 80.0 - 100.0 fL    MCH 27.1 26.0 - 34.0 pg    MCHC 30.1 (L) 31.0 - 37.0 g/dL    RDW 16.2 %    Neutrophil Absolute Prelim 5.68 1.50 - 7.70 x10 (3) uL    Neutrophil Absolute 5.68 1.50 - 7.70 x10(3) uL    Lymphocyte Absolute 2.02 1.00 - 4.00 x10(3) uL    Monocyte Absolute 0.85 0.10 - 1.00 x10(3) uL    Eosinophil Absolute 0.13 0.00 - 0.70 x10(3) uL    Basophil Absolute 0.03 0.00 - 0.20 x10(3) uL    Immature Granulocyte Absolute 0.04 0.00 - 1.00 x10(3) uL    Neutrophil % 64.9 %    Lymphocyte % 23.1 %    Monocyte % 9.7 %    Eosinophil % 1.5 %    Basophil % 0.3 %    Immature Granulocyte % 0.5 %   RAINBOW DRAW BLUE    Collection Time: 06/14/24 11:49 AM   Result Value Ref Range    Hold Blue Auto Resulted    Prothrombin Time (PT)    Collection Time: 06/14/24 11:49 AM   Result Value Ref Range    PT 16.0 (H) 11.6 - 14.8 seconds    INR 1.27 (H) 0.80 - 1.20   PTT, Activated    Collection Time: 06/14/24 11:49 AM   Result Value Ref Range    PTT 34.9 23.0 - 36.0 seconds   EKG 12 Lead    Collection Time: 06/14/24 11:49 AM   Result Value Ref Range    Ventricular rate 76 BPM    Atrial rate  BPM    P-R Interval  ms    QRS Duration 86 ms    Q-T Interval 400 ms    QTC Calculation (Bezet) 450 ms    P Axis  degrees    R Axis 14 degrees    T Axis -3 degrees       Additional Diagnostics:   ECG:      Radiology: [unfilled]      Electronically signed by Christopher Horton MD on 2024  2:54 PM              Consults - MD Consult Notes      Consults signed by Compa Pablo MD at 2024  3:58 PM     Author: Compa Pablo MD Service: — Author Type: Physician    Filed: 2024  3:58 PM Date of Service: 2024  3:17 PM Status: Signed    : Compa Pablo MD (Physician)     Consult Orders    1. ED Consult to Cardiology [[id number]] ordered by Lon Bliss MD at [address] [place] [address] Cardiology  Consultation Note      Sandi Linder Patient Status:  Emergency    1940 MRN [id number]   Location [facility] EMERGENCY DEPARTMENT Attending Lon Bliss MD   Hosp Day # 0 PCP Norm Mendoza MD     Reason for consult: CHF    History of Present Illness:  Sandi Linder is a 83 year old female who [facility] on [facility] with several days of worsening SOB and orthopnea. Has chronic LE edema on oral lasix at home. No CP. Here in new Afib, rate controlled. Reports mild palpitations for months. No outward s/o bleeding. Feels very unsteady on feet. She is mostly in wheelchair, but gets up to the bathroom on her own and frequently falls with head strike, last one was less than 1 week ago.     Medications:  Current Facility-Administered Medications   Medication Dose Route Frequency    heparin (Porcine) 1000 UNIT/ML injection - BOLUS IV 5,000 Units  60 Units/kg Intravenous Once    heparin (Porcine) 31063 units/250 mL infusion (ACS/AFIB) INITIAL DOSE  12 Units/kg/hr Intravenous Once    heparin (Porcine) 22802 units/250mL infusion ACS/AFIB CONTINUOUS  200-3,000 Units/hr Intravenous Continuous    furosemide (Lasix) 10 mg/mL injection 40 mg  40 mg Intravenous BID (Diuretic)       Past Medical History:    C2 cervical fracture (HCC)    C5 vertebral fracture (HCC)    CVA (cerebral infarction)    x2    GERD (gastroesophageal reflux disease)    HTN (hypertension)    SHANA (obstructive  sleep apnea)    Osteoarthritis    Overactive bladder    Parkinson's disease (HCC)    Pulmonary hypertension (HCC)    RA (rheumatoid arthritis) (HCC)    Type II or unspecified type diabetes mellitus without mention of complication, not stated as uncontrolled    Unspecified sleep apnea    AHI 78 RDI 80 SaO2 deirdre 85 % CPAP 11  Sleep RX/ now HME    Vitamin D deficiency    Vitamin D deficiency       Past Surgical History:   Procedure Laterality Date    Cholecystectomy      Hernia surgery      Hip replacement surgery Bilateral     Knee replacement surgery Right     Other surgical history      \"Veins removed\"    Removal of heel spur Right     Tonsillectomy         Family History  family history includes Other in her mother and sister; Psoriasis in her brother; mitral valve prolapse in her sister.    Social History   reports that she has never smoked. She has never used smokeless tobacco. She reports that she does not drink alcohol and does not use drugs.     Allergies  Allergies   Allergen Reactions    Naproxen OTHER (SEE COMMENTS)     headaches    Adhesive Tape RASH     States makes skin \"raw\"    Chocolate RASH    Strawberries RASH       Review of Systems:  As per HPI, otherwise 10 point ROS is negative in detail.    Physical Exam:  Blood pressure 140/65, pulse 67, temperature 96.9 °F (36.1 °C), temperature source Temporal, resp. rate 18, height 62\", weight 184 lb (83.5 kg), SpO2 95%, not currently breastfeeding.  Temp (24hrs), Av.9 °F (36.1 °C), Min:96.9 °F (36.1 °C), Max:96.9 °F (36.1 °C)    Wt Readings from Last 3 Encounters:   24 184 lb (83.5 kg)   08/10/23 186 lb (84.4 kg)   23 186 lb (84.4 kg)       General: Awake and alert; in no acute distress  HEENT: Extraocular movements are intact; sclerae are anicteric; scalp is atraumatic  Neck: Supple; no JVD; no carotid bruits  Cardiac: Irreg ireg, variable S1, nl S2, no murmurs, rubs, or gallops are appreciated  Lungs: Bibasilar rales  Abdomen: Soft,  non-distended, non-tender; bowel sounds are normoactive  Extremities: Warm, 1-2+ pitting edema b/l LE  Psychiatric: Normal mood and affect; answers questions appropriately  Dermatologic: No rashes; normal skin turgor    Diagnostic testing:    Labs:   Lab Results   Component Value Date    INR 1.27 (H) 06/14/2024    INR 1.05 01/08/2022        Lab Results   Component Value Date    WBC 8.8 06/14/2024    HGB 9.1 06/14/2024    HCT 30.2 06/14/2024    .0 06/14/2024    CREATSERUM 0.97 06/14/2024    BUN 20 06/14/2024     06/14/2024    K 4.6 06/14/2024     06/14/2024    CO2 26.0 06/14/2024     06/14/2024    CA 9.2 06/14/2024    ALB 3.3 06/14/2024    ALKPHO 148 06/14/2024    BILT 0.7 06/14/2024    TP 8.0 06/14/2024    AST 33 06/14/2024    ALT 10 06/14/2024    PTT 34.9 06/14/2024    INR 1.27 06/14/2024    PTP 16.0 06/14/2024       Cardiac diagnostics:    CXR 6/14/2024   Mild cardiomegaly with subsegmental atelectasis at the lung bases.     EKG 6/14/2024:   Atrial fibrillation   Low voltage QRS, consider pulmonary disease, pericardial effusion, or normal variant   Septal infarct (cited on or before 14-JUN-2024)   Abnormal ECG   76 bpm    Echo 5/19/15:  1.  Normal left ventricular size and systolic function.  2.  Mild left atrium enlargement.  3.  Physiologic valvular regurgitation.  4.  Mild pulmonary hypertension.  5.  Diastolic dysfunction.    Impression:  83 year old female presenting with SOB, orthopnea, edema consistent with acute HF    Acute HF  Hypoxemic resp failure - 2L O2  Afib - new diagnosis  CHADS VASC ~ 6  HTN - borderline  HLD   SHANA - CPAP  DM2  Parkinson  Frequent falls - gait instability     Recommendations:  IV lasix, already feeling improvement since first dose. Takes 40mg PO daily at home, may need higher daily maintenance dose.   Check echo.   IV heparin for now. She is high stroke risk thus OAC would be indicated, however patient and family are concerned about frequent falls with  recurrent head strike. Suspect gait instability due to Parkinson disease and deconditioning. Will need to decide if she is safe enough to take DOAC at discharge and to consider LAAO in the future.    Continue Toprol 100mg BID  Holding home losartan, once adequately diuresed, consider changing this to Entresto    Thank you for allowing our practice to participate in the care of your patient. Please do not hesitate to contact me if you have any questions.    Compa Pablo MD  Interventional Cardiology  [facility]  Office: [phone]    2024  3:17 PM    Total encounter time 75 minutes.       Electronically signed by Compa Pablo MD on 2024  3:58 PM           D/C Summary    No notes of this type exist for this encounter.     Imaging Results (HF patients)    Chest X-Ray Results (HF patients only)    No exam resulted this encounter.      2D Echocardiogram Results (HF patients only)    CARD ECHO 2D DOPPLER (CPT=93306)    Result Date: 6/15/2024  Transthoracic Echocardiogram Name:Sandi Linder Date: 06/15/2024 :  1940 Ht:  (62in)  BP: 119 / 62 MRN:  [id number]    Age:  83years    Wt:  (191lb) HR: 77bpm Loc:  [facility]       Gndr: F          BSA: 1.87m^2 Sonographer: Josefina LOPEZ Ordering:    Christopher Horton Consulting:  Aure Ordonez ---------------------------------------------------------------------------- History/Indications:  Difficulty in breathing. ---------------------------------------------------------------------------- Procedure information:  A transthoracic complete 2D study was performed. Additional evaluation included M-mode, complete spectral Doppler, and color Doppler.  Patient status:  Inpatient.  Location:  [place] [address].    Comparison was made to the study of 2015.    This was a routine study. Transthoracic echocardiography for ventricular function evaluation and assessment of valvular function. Image quality was adequate. ECG rhythm:   Atrial fibrillation  ---------------------------------------------------------------------------- Conclusions: 1. Left ventricle: The cavity size was normal. Wall thickness was normal.    Systolic function was normal. The estimated ejection fraction was 55-60%.    No diagnostic evidence for regional wall motion abnormalities. Unable to    assess LV diastolic function due to heart rhythm. 2. Left atrium: The left atrial volume was mildly increased. 3. Right atrium: The atrium was mildly dilated. 4. Mitral valve: There was mild regurgitation. 5. Pulmonary arteries: The peak systolic pressure is 39mm Hg. * ---------------------------------------------------------------------------- * Findings: Left ventricle:  The cavity size was normal. Wall thickness was normal. Systolic function was normal. The estimated ejection fraction was 55-60%. No diagnostic evidence for regional wall motion abnormalities. Unable to assess LV diastolic function due to heart rhythm. Left atrium:  The left atrial volume was mildly increased. Right ventricle:  The cavity size was normal. Systolic function was normal. Right atrium:  The atrium was mildly dilated. Mitral valve:  The leaflets were mildly thickened. Leaflet separation was normal.  Doppler:  Transvalvular velocity was within the normal range. There was no evidence for stenosis. There was mild regurgitation. Aortic valve:  The valve was structurally normal. The valve was trileaflet. Cusp separation was normal.  Doppler:  Transvalvular velocity was within the normal range. There was no evidence for stenosis. There was no significant regurgitation. Tricuspid valve:  The valve is structurally normal. Leaflet separation was normal.  Doppler:  Transvalvular velocity was within the normal range. There was no evidence for stenosis. There was mild regurgitation. Pulmonic valve:   The valve is structurally normal. Cusp separation was normal.  Doppler:  Transvalvular velocity was within the normal range. There was  no evidence for stenosis. There was trivial regurgitation. Pericardium:   There was no pericardial effusion. Aorta: Aortic root: The aortic root was normal. Ascending aorta: The ascending aorta was normal. Pulmonary arteries: Systolic pressure was estimated to be 39mm Hg. Estimated pulmonary artery diastolic pressure was 7mm Hg. Systemic veins:  Central venous respirophasic diameter changes are in the normal range (>50%). ---------------------------------------------------------------------------- Measurements  Left ventricle                    Value        Ref  IVS thickness, ED, PLAX           0.9   cm     0.6 -                                                 0.9  LV ID, ED, PLAX                   4.6   cm     3.8 -                                                 5.2  LV ID, ES, PLAX                   3.1   cm     2.2 -                                                 3.5  LV PW thickness, ED, PLAX     (H) 1.0   cm     0.6 -                                                 0.9  IVS/LV PW ratio, ED, PLAX         0.84         --------  LV PW/LV ID ratio, ED, PLAX       0.23         --------  LV ejection fraction              59    %      54 - 74  Aortic root                       Value        Ref  Aortic root ID, ED                2.8   cm     2.6 -                                                 4.0  Ascending aorta                   Value        Ref  Ascending aorta ID, A-P, ED       3.2   cm     1.9 -                                                 3.5  Left atrium                       Value        Ref  LA ID, A-P, ES                (H) 4.7   cm     2.7 -                                                 3.8  LA volume, S                  (H) 66    ml     22 - 52  LA volume/bsa, S              (H) 35    ml/m^2 16 - 34  LA volume, ES, 1-p A4C        (H) 65    ml     22 - 52  LA volume, ES, 1-p A2C        (H) 64    ml     22 - 52  LA volume, ES, A/L                70    ml     --------  LA volume/bsa, ES, A/L         (H) 37    ml/m^2 16 - 34  LA/aortic root ratio              1.68         --------  Pulmonary artery                  Value        Ref  PA pressure, S, DP                39    mm Hg  --------  PA pressure, ED, DP               7     mm Hg  --------  Tricuspid valve                   Value        Ref  Tricuspid regurg peak         (H) 2.93  m/sec  <=2.8  velocity  Tricuspid peak RV-RA gradient     34    mm Hg  --------  Systemic veins                    Value        Ref  Estimated CVP                     5     mm Hg  --------  Right ventricle                   Value        Ref  RV pressure, S, DP                39    mm Hg  --------  Pulmonic valve                    Value        Ref  Pulmonic regurg velocity, ED      0.69  m/sec  --------  Pulmonic regurg gradient, ED      2     mm Hg  -------- Legend: (L)  and  (H)  tonya values outside specified reference range. ---------------------------------------------------------------------------- Prepared and electronically signed by Gene Luther MD 06/15/2024 17:31         Cath Angiogram Results ([facility] Patients only)    No exam resulted this encounter.       Physical Therapy Notes (last 72 hours)  Notes from 6/17/2024  1:21 PM through 6/20/2024  1:21 PM   No notes of this type exist for this encounter.     Occupational Therapy Notes (last 72 hours)  Notes from 6/17/2024  1:21 PM through 6/20/2024  1:21 PM   No notes of this type exist for this encounter.     Video Swallow Study Notes    No notes of this type exist for this encounter.        SLP Note - SLP Notes      SLP Note signed by Gena Valentine at 6/17/2024 11:49 AM  Version 1 of 1    Author: Gena Valentine Service: Rehab Author Type: SLP Student    Filed: 6/17/2024 11:49 AM Date of Service: 6/17/2024 11:01 AM Status: Signed    : Gena Valentine (SLP Student) Cosigner: Ramy Rodríguez, SLP at 6/17/2024 11:50 AM       SPEECH DAILY NOTE - INPATIENT    ASSESSMENT & PLAN   ASSESSMENT  Patient is an 84 yo woman seen  today to monitor current diet tolerance and reinforce aspiration precautions. Patient received alert in bed and tolerating 2L O2 on NC. Patient reported difficulty swallowing a larger pill with a thin liquid last night and that she ended up coughing it up. Vocal quality clear at baseline and following PO trials. Bolus acceptance was adequate without evidence of anterior bolus loss. Mastication and AP transit were thorough and efficient. Pharyngeal swallow initiation appeared timely. No overt s/s aspiration observed. Patient denied odynophagia and globus sensation. Patient independently initiated multiple swallows per bolus and taking small bites and sips.    Recommend patient continue regular diet and thin liquids. Recommend administering medication whole in puree. Recommend patient continue implementing aspiration precautions listed below. No further inpatient SLP services warranted at this time as deficits identified do no require skilled intervention.         Diet Recommendations - Solids: Regular  Diet Recommendations - Liquids: Thin Liquids    Compensatory Strategies Recommended: Slow rate;Small bites and sips;Multiple swallows;Extra sauce/gravy  Aspiration Precautions: Upright position;Slow rate;Small bites and sips  Medication Administration Recommendations: Whole in puree    Patient Experiencing Pain: No                Treatment Plan  Treatment Plan/Recommendations: No further inpatient SLP service warranted    Interdisciplinary Communication: Discussed with RN          GOALS  Goal #1 The patient will tolerate regular consistency and thin liquids without overt signs or symptoms of aspiration with 100 % accuracy over 1 session(s). Met   Goal #2 The patient/family/caregiver will demonstrate understanding and implementation of aspiration precautions and swallow strategies independently over 1 session(s).    Met     FOLLOW UP  Follow Up Needed (Documentation Required): No  SLP Follow-up Date: 06/17/24  Number of  Visits to Meet Established Goals: 1    Session: 2    If you have any questions, please contact Gena GARDNER    Electronically signed by Ramy Rodríguez, SLP on 6/17/2024 11:50 AM           Multidisciplinary Problems     Active Goals        Problem: Patient/Family Goals    Goal Priority Disciplines Outcome Interventions   Patient/Family Long Term Goal     Interdisciplinary Progressing    Description: Patient's Long Term Goal: discharge soon back to [facility] with     Interventions:  - Cardiology consult and follow orders , labs,monitor cardiac rhythm ,V/S  Follow up appointments after discharge, watch men procedure, Adhere  medication  regimen, daily weight No recommendations at this time management , Activity as tolerated, fall preventions and safety awareness      - See additional Care Plan goals for specific interventions   Patient/Family Short Term Goal     Interdisciplinary Progressing    Description: Patient's Short Term Goal: breath comfortable    Interventions:   - frequent assess and assist  in adl ,pain mgt , safety precautions,daily weight, PT OT eval, am labs, lasix therapy, daily weight, strict I&O ,Cardiac life style  - See additional Care Plan goals for specific interventions